# Patient Record
Sex: FEMALE | Race: WHITE | Employment: FULL TIME
[De-identification: names, ages, dates, MRNs, and addresses within clinical notes are randomized per-mention and may not be internally consistent; named-entity substitution may affect disease eponyms.]

---

## 2017-01-04 ENCOUNTER — OFFICE VISIT (OUTPATIENT)
Dept: FAMILY MEDICINE CLINIC | Facility: CLINIC | Age: 52
End: 2017-01-04

## 2017-01-04 VITALS
RESPIRATION RATE: 18 BRPM | DIASTOLIC BLOOD PRESSURE: 64 MMHG | WEIGHT: 112 LBS | BODY MASS INDEX: 19.84 KG/M2 | SYSTOLIC BLOOD PRESSURE: 104 MMHG | HEIGHT: 63 IN | HEART RATE: 66 BPM

## 2017-01-04 DIAGNOSIS — M62.838 MUSCLE SPASM: ICD-10-CM

## 2017-01-04 DIAGNOSIS — M85.80 OSTEOPENIA: ICD-10-CM

## 2017-01-04 DIAGNOSIS — M62.89 MUSCLE STIFFNESS: Primary | ICD-10-CM

## 2017-01-04 DIAGNOSIS — M46.1 SACROILIITIS (HCC): ICD-10-CM

## 2017-01-04 DIAGNOSIS — M54.6 ACUTE MIDLINE THORACIC BACK PAIN: ICD-10-CM

## 2017-01-04 PROCEDURE — 99214 OFFICE O/P EST MOD 30 MIN: CPT | Performed by: NURSE PRACTITIONER

## 2017-01-04 RX ORDER — ACETAMINOPHEN 160 MG
TABLET,DISINTEGRATING ORAL
COMMUNITY
End: 2022-06-20

## 2017-01-05 ASSESSMENT — ENCOUNTER SYMPTOMS
EYES NEGATIVE: 1
ABDOMINAL DISTENTION: 0
BACK PAIN: 1
COUGH: 0
WHEEZING: 0

## 2017-02-09 ENCOUNTER — PATIENT MESSAGE (OUTPATIENT)
Dept: FAMILY MEDICINE CLINIC | Facility: CLINIC | Age: 52
End: 2017-02-09

## 2017-06-12 ENCOUNTER — HOSPITAL ENCOUNTER (OUTPATIENT)
Age: 52
Setting detail: SPECIMEN
Discharge: HOME | End: 2017-06-12
Payer: COMMERCIAL

## 2017-06-12 ENCOUNTER — OFFICE VISIT (OUTPATIENT)
Dept: OBGYN | Age: 52
End: 2017-06-12
Payer: COMMERCIAL

## 2017-06-12 VITALS
SYSTOLIC BLOOD PRESSURE: 112 MMHG | DIASTOLIC BLOOD PRESSURE: 68 MMHG | WEIGHT: 111 LBS | RESPIRATION RATE: 16 BRPM | BODY MASS INDEX: 19.67 KG/M2 | HEIGHT: 63 IN

## 2017-06-12 DIAGNOSIS — F41.9 ANXIETY AND DEPRESSION: ICD-10-CM

## 2017-06-12 DIAGNOSIS — M85.80 OSTEOPENIA DETERMINED BY X-RAY: ICD-10-CM

## 2017-06-12 DIAGNOSIS — F32.A ANXIETY AND DEPRESSION: ICD-10-CM

## 2017-06-12 DIAGNOSIS — N95.0 PMB (POSTMENOPAUSAL BLEEDING): Primary | ICD-10-CM

## 2017-06-12 PROCEDURE — 1036F TOBACCO NON-USER: CPT | Performed by: OBSTETRICS & GYNECOLOGY

## 2017-06-12 PROCEDURE — 58100 BIOPSY OF UTERUS LINING: CPT | Performed by: OBSTETRICS & GYNECOLOGY

## 2017-06-12 PROCEDURE — 99213 OFFICE O/P EST LOW 20 MIN: CPT | Performed by: OBSTETRICS & GYNECOLOGY

## 2017-06-12 PROCEDURE — 88305 TISSUE EXAM BY PATHOLOGIST: CPT

## 2017-06-12 RX ORDER — IBANDRONATE SODIUM 150 MG/1
TABLET, FILM COATED ORAL
Qty: 3 TABLET | Refills: 4 | Status: SHIPPED | OUTPATIENT
Start: 2017-06-12 | End: 2017-07-06 | Stop reason: SDUPTHER

## 2017-06-12 RX ORDER — DESVENLAFAXINE 100 MG/1
100 TABLET, EXTENDED RELEASE ORAL DAILY
Qty: 90 TABLET | Refills: 4 | Status: SHIPPED | OUTPATIENT
Start: 2017-06-12 | End: 2017-07-06 | Stop reason: SDUPTHER

## 2017-06-14 LAB — SURGICAL PATHOLOGY REPORT: NORMAL

## 2017-06-30 ENCOUNTER — OFFICE VISIT (OUTPATIENT)
Dept: FAMILY MEDICINE CLINIC | Age: 52
End: 2017-06-30
Payer: COMMERCIAL

## 2017-06-30 VITALS
SYSTOLIC BLOOD PRESSURE: 110 MMHG | HEART RATE: 78 BPM | BODY MASS INDEX: 20.24 KG/M2 | RESPIRATION RATE: 18 BRPM | HEIGHT: 62 IN | WEIGHT: 110 LBS | OXYGEN SATURATION: 98 % | DIASTOLIC BLOOD PRESSURE: 80 MMHG

## 2017-06-30 DIAGNOSIS — F32.A ANXIETY AND DEPRESSION: Primary | ICD-10-CM

## 2017-06-30 DIAGNOSIS — F34.1 DYSTHYMIC DISORDER: ICD-10-CM

## 2017-06-30 DIAGNOSIS — F41.9 ANXIETY AND DEPRESSION: Primary | ICD-10-CM

## 2017-06-30 DIAGNOSIS — Z11.4 SCREENING FOR HIV WITHOUT PRESENCE OF RISK FACTORS: ICD-10-CM

## 2017-06-30 DIAGNOSIS — M85.80 OSTEOPENIA: ICD-10-CM

## 2017-06-30 DIAGNOSIS — Z11.59 NEED FOR HEPATITIS C SCREENING TEST: ICD-10-CM

## 2017-06-30 DIAGNOSIS — F43.0 STRESS REACTION: ICD-10-CM

## 2017-06-30 PROCEDURE — G8420 CALC BMI NORM PARAMETERS: HCPCS | Performed by: NURSE PRACTITIONER

## 2017-06-30 PROCEDURE — 1036F TOBACCO NON-USER: CPT | Performed by: NURSE PRACTITIONER

## 2017-06-30 PROCEDURE — 3014F SCREEN MAMMO DOC REV: CPT | Performed by: NURSE PRACTITIONER

## 2017-06-30 PROCEDURE — G8427 DOCREV CUR MEDS BY ELIG CLIN: HCPCS | Performed by: NURSE PRACTITIONER

## 2017-06-30 PROCEDURE — 3017F COLORECTAL CA SCREEN DOC REV: CPT | Performed by: NURSE PRACTITIONER

## 2017-06-30 PROCEDURE — 99214 OFFICE O/P EST MOD 30 MIN: CPT | Performed by: NURSE PRACTITIONER

## 2017-06-30 ASSESSMENT — ENCOUNTER SYMPTOMS
ABDOMINAL DISTENTION: 0
COUGH: 0
EYES NEGATIVE: 1

## 2017-07-06 ENCOUNTER — TELEPHONE (OUTPATIENT)
Dept: FAMILY MEDICINE CLINIC | Age: 52
End: 2017-07-06

## 2017-07-06 DIAGNOSIS — M85.80 OSTEOPENIA DETERMINED BY X-RAY: ICD-10-CM

## 2017-07-06 DIAGNOSIS — F41.9 ANXIETY AND DEPRESSION: ICD-10-CM

## 2017-07-06 DIAGNOSIS — I10 ESSENTIAL HYPERTENSION, BENIGN: ICD-10-CM

## 2017-07-06 DIAGNOSIS — F32.A ANXIETY AND DEPRESSION: ICD-10-CM

## 2017-07-06 RX ORDER — IBANDRONATE SODIUM 150 MG/1
TABLET, FILM COATED ORAL
Qty: 3 TABLET | Refills: 4 | Status: SHIPPED | OUTPATIENT
Start: 2017-07-06 | End: 2017-09-12 | Stop reason: SDUPTHER

## 2017-07-06 RX ORDER — DESVENLAFAXINE 100 MG/1
100 TABLET, EXTENDED RELEASE ORAL DAILY
Qty: 90 TABLET | Refills: 4 | Status: SHIPPED | OUTPATIENT
Start: 2017-07-06 | End: 2017-09-12 | Stop reason: SDUPTHER

## 2017-07-07 RX ORDER — HYDROCHLOROTHIAZIDE 25 MG/1
25 TABLET ORAL DAILY
Qty: 90 TABLET | Refills: 1 | Status: SHIPPED | OUTPATIENT
Start: 2017-07-07 | End: 2017-07-14 | Stop reason: SDUPTHER

## 2017-07-14 RX ORDER — HYDROCHLOROTHIAZIDE 25 MG/1
25 TABLET ORAL DAILY
Qty: 90 TABLET | Refills: 1 | Status: SHIPPED | OUTPATIENT
Start: 2017-07-14 | End: 2017-12-26 | Stop reason: SDUPTHER

## 2017-09-12 ENCOUNTER — HOSPITAL ENCOUNTER (OUTPATIENT)
Age: 52
Setting detail: SPECIMEN
Discharge: HOME | End: 2017-09-12
Payer: COMMERCIAL

## 2017-09-12 ENCOUNTER — OFFICE VISIT (OUTPATIENT)
Dept: OBGYN CLINIC | Age: 52
End: 2017-09-12
Payer: COMMERCIAL

## 2017-09-12 VITALS
RESPIRATION RATE: 16 BRPM | HEIGHT: 63 IN | BODY MASS INDEX: 20.38 KG/M2 | SYSTOLIC BLOOD PRESSURE: 110 MMHG | DIASTOLIC BLOOD PRESSURE: 62 MMHG | WEIGHT: 115 LBS

## 2017-09-12 DIAGNOSIS — Z12.31 ENCOUNTER FOR SCREENING MAMMOGRAM FOR BREAST CANCER: Primary | ICD-10-CM

## 2017-09-12 DIAGNOSIS — M85.80 OSTEOPENIA DETERMINED BY X-RAY: ICD-10-CM

## 2017-09-12 DIAGNOSIS — F32.A ANXIETY AND DEPRESSION: ICD-10-CM

## 2017-09-12 DIAGNOSIS — F41.9 ANXIETY AND DEPRESSION: ICD-10-CM

## 2017-09-12 DIAGNOSIS — Z12.4 ENCOUNTER FOR SCREENING FOR CERVICAL CANCER: ICD-10-CM

## 2017-09-12 PROCEDURE — 99396 PREV VISIT EST AGE 40-64: CPT | Performed by: OBSTETRICS & GYNECOLOGY

## 2017-09-12 PROCEDURE — G0145 SCR C/V CYTO,THINLAYER,RESCR: HCPCS

## 2017-09-12 RX ORDER — IBANDRONATE SODIUM 150 MG/1
TABLET, FILM COATED ORAL
Qty: 3 TABLET | Refills: 4 | Status: SHIPPED | OUTPATIENT
Start: 2017-09-12 | End: 2018-10-02 | Stop reason: CLARIF

## 2017-09-12 RX ORDER — DESVENLAFAXINE 100 MG/1
100 TABLET, EXTENDED RELEASE ORAL DAILY
Qty: 90 TABLET | Refills: 4 | Status: SHIPPED | OUTPATIENT
Start: 2017-09-12 | End: 2018-08-27 | Stop reason: SDUPTHER

## 2017-09-20 LAB — CYTOLOGY REPORT: NORMAL

## 2017-11-29 ENCOUNTER — HOSPITAL ENCOUNTER (OUTPATIENT)
Dept: MAMMOGRAPHY | Age: 52
Discharge: HOME | End: 2017-11-29
Payer: COMMERCIAL

## 2017-11-29 DIAGNOSIS — Z12.31 ENCOUNTER FOR SCREENING MAMMOGRAM FOR BREAST CANCER: ICD-10-CM

## 2017-11-29 PROCEDURE — G0202 SCR MAMMO BI INCL CAD: HCPCS

## 2017-12-26 DIAGNOSIS — I10 ESSENTIAL HYPERTENSION, BENIGN: ICD-10-CM

## 2017-12-26 RX ORDER — HYDROCHLOROTHIAZIDE 25 MG/1
TABLET ORAL
Qty: 90 TABLET | Refills: 1 | Status: SHIPPED | OUTPATIENT
Start: 2017-12-26 | End: 2018-06-24 | Stop reason: SDUPTHER

## 2018-01-18 DIAGNOSIS — G43.101 MIGRAINE WITH AURA AND WITH STATUS MIGRAINOSUS, NOT INTRACTABLE: ICD-10-CM

## 2018-01-18 RX ORDER — ZOLMITRIPTAN 5 MG/1
5 TABLET, FILM COATED ORAL PRN
Qty: 30 TABLET | Refills: 1 | Status: SHIPPED | OUTPATIENT
Start: 2018-01-18 | End: 2018-08-28 | Stop reason: SDUPTHER

## 2018-03-13 DIAGNOSIS — I10 ESSENTIAL HYPERTENSION, BENIGN: ICD-10-CM

## 2018-03-13 RX ORDER — ATENOLOL 25 MG/1
25 TABLET ORAL DAILY
Qty: 90 TABLET | Refills: 1 | Status: SHIPPED | OUTPATIENT
Start: 2018-03-13 | End: 2018-08-17 | Stop reason: SDUPTHER

## 2018-03-13 NOTE — TELEPHONE ENCOUNTER
From: Milagros Barron  Sent: 3/13/2018 7:50 AM EDT  Subject: Medication Renewal Request    Clay Andersonmarco antoniosonja.  Luh Crawford would like a refill of the following medications:     atenolol (TENORMIN) 25 MG tablet Alana Burr NP]    Preferred pharmacy: [address] [place] [address] , [address] [facility] [phone] [facility] [phone]    Comment:

## 2018-05-23 ENCOUNTER — OFFICE VISIT (OUTPATIENT)
Dept: FAMILY MEDICINE CLINIC | Age: 53
End: 2018-05-23
Payer: COMMERCIAL

## 2018-05-23 VITALS
WEIGHT: 113 LBS | HEART RATE: 62 BPM | RESPIRATION RATE: 18 BRPM | BODY MASS INDEX: 20.02 KG/M2 | SYSTOLIC BLOOD PRESSURE: 110 MMHG | DIASTOLIC BLOOD PRESSURE: 70 MMHG | HEIGHT: 63 IN | OXYGEN SATURATION: 98 %

## 2018-05-23 DIAGNOSIS — G56.02 CARPAL TUNNEL SYNDROME OF LEFT WRIST: Primary | ICD-10-CM

## 2018-05-23 DIAGNOSIS — M25.532 LEFT WRIST PAIN: ICD-10-CM

## 2018-05-23 PROCEDURE — 1036F TOBACCO NON-USER: CPT | Performed by: NURSE PRACTITIONER

## 2018-05-23 PROCEDURE — G8427 DOCREV CUR MEDS BY ELIG CLIN: HCPCS | Performed by: NURSE PRACTITIONER

## 2018-05-23 PROCEDURE — 3017F COLORECTAL CA SCREEN DOC REV: CPT | Performed by: NURSE PRACTITIONER

## 2018-05-23 PROCEDURE — 99213 OFFICE O/P EST LOW 20 MIN: CPT | Performed by: NURSE PRACTITIONER

## 2018-05-23 PROCEDURE — G8420 CALC BMI NORM PARAMETERS: HCPCS | Performed by: NURSE PRACTITIONER

## 2018-05-23 PROCEDURE — 3014F SCREEN MAMMO DOC REV: CPT | Performed by: NURSE PRACTITIONER

## 2018-05-24 ASSESSMENT — ENCOUNTER SYMPTOMS
CHEST TIGHTNESS: 0
ABDOMINAL DISTENTION: 0
SINUS PRESSURE: 0

## 2018-06-24 DIAGNOSIS — I10 ESSENTIAL HYPERTENSION, BENIGN: ICD-10-CM

## 2018-06-25 RX ORDER — HYDROCHLOROTHIAZIDE 25 MG/1
TABLET ORAL
Qty: 90 TABLET | Refills: 1 | Status: SHIPPED | OUTPATIENT
Start: 2018-06-25 | End: 2018-12-22 | Stop reason: SDUPTHER

## 2018-08-17 DIAGNOSIS — I10 ESSENTIAL HYPERTENSION, BENIGN: ICD-10-CM

## 2018-08-17 RX ORDER — ATENOLOL 25 MG/1
TABLET ORAL
Qty: 90 TABLET | Refills: 1 | Status: SHIPPED | OUTPATIENT
Start: 2018-08-17 | End: 2019-02-13 | Stop reason: SDUPTHER

## 2018-08-17 NOTE — TELEPHONE ENCOUNTER
Last OV: 5/23/2018  Last RX: 3-13-18  Next scheduled apt: Visit date not found  Medication pending  Sure scripts request

## 2018-08-27 DIAGNOSIS — F32.A ANXIETY AND DEPRESSION: ICD-10-CM

## 2018-08-27 DIAGNOSIS — F41.9 ANXIETY AND DEPRESSION: ICD-10-CM

## 2018-08-27 RX ORDER — DESVENLAFAXINE 100 MG/1
100 TABLET, EXTENDED RELEASE ORAL DAILY
Qty: 90 TABLET | Refills: 4 | Status: SHIPPED | OUTPATIENT
Start: 2018-08-27 | End: 2019-08-13 | Stop reason: SDUPTHER

## 2018-08-28 DIAGNOSIS — G43.101 MIGRAINE WITH AURA AND WITH STATUS MIGRAINOSUS, NOT INTRACTABLE: ICD-10-CM

## 2018-08-28 RX ORDER — ZOLMITRIPTAN 5 MG/1
5 TABLET, FILM COATED ORAL PRN
Qty: 30 TABLET | Refills: 1 | Status: SHIPPED | OUTPATIENT
Start: 2018-08-28 | End: 2020-05-06 | Stop reason: SDUPTHER

## 2018-10-02 ENCOUNTER — OFFICE VISIT (OUTPATIENT)
Dept: OBGYN CLINIC | Age: 53
End: 2018-10-02
Payer: COMMERCIAL

## 2018-10-02 ENCOUNTER — HOSPITAL ENCOUNTER (OUTPATIENT)
Age: 53
Setting detail: SPECIMEN
Discharge: HOME | End: 2018-10-02
Payer: COMMERCIAL

## 2018-10-02 VITALS
HEIGHT: 63 IN | WEIGHT: 113 LBS | BODY MASS INDEX: 20.02 KG/M2 | SYSTOLIC BLOOD PRESSURE: 100 MMHG | RESPIRATION RATE: 16 BRPM | HEART RATE: 60 BPM | DIASTOLIC BLOOD PRESSURE: 62 MMHG

## 2018-10-02 DIAGNOSIS — Z12.4 ENCOUNTER FOR SCREENING FOR CERVICAL CANCER: ICD-10-CM

## 2018-10-02 DIAGNOSIS — Z12.31 ENCOUNTER FOR SCREENING MAMMOGRAM FOR BREAST CANCER: Primary | ICD-10-CM

## 2018-10-02 DIAGNOSIS — M85.80 OSTEOPENIA DETERMINED BY X-RAY: ICD-10-CM

## 2018-10-02 PROCEDURE — 99396 PREV VISIT EST AGE 40-64: CPT | Performed by: OBSTETRICS & GYNECOLOGY

## 2018-10-02 PROCEDURE — G8484 FLU IMMUNIZE NO ADMIN: HCPCS | Performed by: OBSTETRICS & GYNECOLOGY

## 2018-10-02 PROCEDURE — G0145 SCR C/V CYTO,THINLAYER,RESCR: HCPCS

## 2018-10-02 RX ORDER — RALOXIFENE HYDROCHLORIDE 60 MG/1
60 TABLET, FILM COATED ORAL DAILY
Qty: 90 TABLET | Refills: 4 | Status: SHIPPED | OUTPATIENT
Start: 2018-10-02 | End: 2019-10-08 | Stop reason: SDUPTHER

## 2018-10-02 ASSESSMENT — PATIENT HEALTH QUESTIONNAIRE - PHQ9
SUM OF ALL RESPONSES TO PHQ QUESTIONS 1-9: 0
SUM OF ALL RESPONSES TO PHQ QUESTIONS 1-9: 0
2. FEELING DOWN, DEPRESSED OR HOPELESS: 0
SUM OF ALL RESPONSES TO PHQ9 QUESTIONS 1 & 2: 0
1. LITTLE INTEREST OR PLEASURE IN DOING THINGS: 0

## 2018-10-19 LAB — CYTOLOGY REPORT: NORMAL

## 2018-11-02 ENCOUNTER — OFFICE VISIT (OUTPATIENT)
Dept: FAMILY MEDICINE CLINIC | Age: 53
End: 2018-11-02
Payer: COMMERCIAL

## 2018-11-02 VITALS
DIASTOLIC BLOOD PRESSURE: 62 MMHG | SYSTOLIC BLOOD PRESSURE: 100 MMHG | WEIGHT: 113 LBS | RESPIRATION RATE: 18 BRPM | BODY MASS INDEX: 20.02 KG/M2 | HEART RATE: 68 BPM | HEIGHT: 63 IN

## 2018-11-02 DIAGNOSIS — J01.00 ACUTE MAXILLARY SINUSITIS, RECURRENCE NOT SPECIFIED: ICD-10-CM

## 2018-11-02 PROCEDURE — 3014F SCREEN MAMMO DOC REV: CPT | Performed by: INTERNAL MEDICINE

## 2018-11-02 PROCEDURE — 1036F TOBACCO NON-USER: CPT | Performed by: INTERNAL MEDICINE

## 2018-11-02 PROCEDURE — 3017F COLORECTAL CA SCREEN DOC REV: CPT | Performed by: INTERNAL MEDICINE

## 2018-11-02 PROCEDURE — G8427 DOCREV CUR MEDS BY ELIG CLIN: HCPCS | Performed by: INTERNAL MEDICINE

## 2018-11-02 PROCEDURE — 99213 OFFICE O/P EST LOW 20 MIN: CPT | Performed by: INTERNAL MEDICINE

## 2018-11-02 PROCEDURE — G8420 CALC BMI NORM PARAMETERS: HCPCS | Performed by: INTERNAL MEDICINE

## 2018-11-02 PROCEDURE — G8484 FLU IMMUNIZE NO ADMIN: HCPCS | Performed by: INTERNAL MEDICINE

## 2018-11-02 RX ORDER — CLARITHROMYCIN 250 MG/1
250 TABLET, FILM COATED ORAL 2 TIMES DAILY
Qty: 20 TABLET | Refills: 0 | Status: SHIPPED | OUTPATIENT
Start: 2018-11-02 | End: 2018-11-12

## 2018-11-02 ASSESSMENT — ENCOUNTER SYMPTOMS
ABDOMINAL PAIN: 0
SHORTNESS OF BREATH: 0
NAUSEA: 0
SINUS COMPLAINT: 1
WHEEZING: 0
EYE REDNESS: 0
EYE ITCHING: 0
CHEST TIGHTNESS: 0
COUGH: 1
SINUS PRESSURE: 1
SORE THROAT: 1
EYE PAIN: 1
EYE DISCHARGE: 0

## 2018-11-02 NOTE — PROGRESS NOTES
PROT 7.1 07/14/2016    LABALBU 4.1 07/14/2016    BILITOT 0.59 07/14/2016    ALKPHOS 49 07/14/2016    AST 21 07/14/2016    ALT 15 07/14/2016     Lab Results   Component Value Date    WBC 6.0 07/14/2016    RBC 3.83 07/14/2016    HGB 12.5 07/14/2016    HCT 37.1 07/14/2016    MCV 97.0 07/14/2016    MCH 32.7 07/14/2016    MCHC 33.8 07/14/2016    RDW 13.2 07/14/2016     07/14/2016    MPV NOT REPORTED 07/14/2016     Lab Results   Component Value Date    TSH 1.34 08/12/2016     Lab Results   Component Value Date    CHOL 215 04/25/2013    HDL 61 04/25/2013          Assessment & Plan        Diagnosis Orders   1. Acute maxillary sinusitis, recurrence not specified   Prescribed Biaxin, she declined cough meds and decongestants stating will take OTC if needed. clarithromycin (BIAXIN) 250 MG tablet                   Completed Refills   Requested Prescriptions     Signed Prescriptions Disp Refills    clarithromycin (BIAXIN) 250 MG tablet 20 tablet 0     Sig: Take 1 tablet by mouth 2 times daily for 10 days     Return if symptoms worsen or fail to improve. Orders Placed This Encounter   Medications    clarithromycin (BIAXIN) 250 MG tablet     Sig: Take 1 tablet by mouth 2 times daily for 10 days     Dispense:  20 tablet     Refill:  0     No orders of the defined types were placed in this encounter. Patient Instructions     SURVEY:    You may be receiving a survey from [employer] regarding your visit today. Please complete the survey to enable us to provide the highest quality of care to you and your family. If you cannot score us a very good on any question, please call the office to discuss how we could of made your experience a very good one. Thank you.         Electronically signed by Jacinta Franklin MD on 11/2/2018 at 10:00 AM           Completed Refills   Requested Prescriptions     Signed Prescriptions Disp Refills    clarithromycin (BIAXIN) 250 MG tablet 20 tablet 0     Sig: Take 1 tablet by

## 2018-12-12 ENCOUNTER — HOSPITAL ENCOUNTER (OUTPATIENT)
Dept: MAMMOGRAPHY | Age: 53
Discharge: HOME | End: 2018-12-14
Payer: COMMERCIAL

## 2018-12-12 DIAGNOSIS — Z12.31 ENCOUNTER FOR SCREENING MAMMOGRAM FOR BREAST CANCER: ICD-10-CM

## 2018-12-12 PROCEDURE — 77067 SCR MAMMO BI INCL CAD: CPT

## 2018-12-22 DIAGNOSIS — I10 ESSENTIAL HYPERTENSION, BENIGN: ICD-10-CM

## 2018-12-24 RX ORDER — HYDROCHLOROTHIAZIDE 25 MG/1
TABLET ORAL
Qty: 90 TABLET | Refills: 1 | Status: SHIPPED | OUTPATIENT
Start: 2018-12-24 | End: 2019-06-22 | Stop reason: SDUPTHER

## 2019-02-13 DIAGNOSIS — I10 ESSENTIAL HYPERTENSION, BENIGN: ICD-10-CM

## 2019-02-13 RX ORDER — ATENOLOL 25 MG/1
TABLET ORAL
Qty: 90 TABLET | Refills: 1 | Status: SHIPPED | OUTPATIENT
Start: 2019-02-13 | End: 2019-08-13 | Stop reason: SDUPTHER

## 2019-04-15 ENCOUNTER — PATIENT MESSAGE (OUTPATIENT)
Dept: OTHER | Facility: CLINIC | Age: 54
End: 2019-04-15

## 2019-04-25 ENCOUNTER — OFFICE VISIT (OUTPATIENT)
Dept: ENT CLINIC | Age: 54
End: 2019-04-25
Payer: COMMERCIAL

## 2019-04-25 VITALS
TEMPERATURE: 97.8 F | WEIGHT: 115 LBS | OXYGEN SATURATION: 98 % | DIASTOLIC BLOOD PRESSURE: 80 MMHG | BODY MASS INDEX: 20.38 KG/M2 | RESPIRATION RATE: 18 BRPM | SYSTOLIC BLOOD PRESSURE: 122 MMHG | HEIGHT: 63 IN | HEART RATE: 67 BPM

## 2019-04-25 DIAGNOSIS — H61.899 DEBRIS IN EAR CANAL: Primary | ICD-10-CM

## 2019-04-25 DIAGNOSIS — H91.90 HEARING LOSS, UNSPECIFIED HEARING LOSS TYPE, UNSPECIFIED LATERALITY: ICD-10-CM

## 2019-04-25 PROCEDURE — 99203 OFFICE O/P NEW LOW 30 MIN: CPT | Performed by: PHYSICIAN ASSISTANT

## 2019-04-25 ASSESSMENT — ENCOUNTER SYMPTOMS
SHORTNESS OF BREATH: 0
BACK PAIN: 0
TROUBLE SWALLOWING: 0
SORE THROAT: 0
COUGH: 0
RECTAL PAIN: 0
CONSTIPATION: 0
VOMITING: 0
COLOR CHANGE: 0
WHEEZING: 0
EYE DISCHARGE: 0
ABDOMINAL DISTENTION: 0
EYE REDNESS: 0
VOICE CHANGE: 0
BLOOD IN STOOL: 0
NAUSEA: 0
APNEA: 0
ANAL BLEEDING: 0
STRIDOR: 0
DIARRHEA: 0
EYE PAIN: 0
FACIAL SWELLING: 0
SINUS PAIN: 0
EYE ITCHING: 0
PHOTOPHOBIA: 0
SINUS PRESSURE: 0
ABDOMINAL PAIN: 0
CHOKING: 0
CHEST TIGHTNESS: 0
RHINORRHEA: 0

## 2019-04-25 NOTE — PROGRESS NOTES
[facility] [address] [place] [address] [facility]  [address] [facility] [address]  Dept: [phone]  MD Diane Ruano PA-C Britta Providence 52 y.o. female     Patient presents with a chief complaint of New Patient (Patient presents today for slight hearing loss-states she was told she \"needs a thin layer over her eardrum suctioned\". ) and Hearing Problem (Right ear marked hearing loss for about 1 year. Does have hearing aid at this time. patient states that she has family history of hearing loss in mother. Had two hearing tests. will obtain records. )       /80 (Site: Right Upper Arm, Position: Sitting, Cuff Size: Medium Adult)   Pulse 67   Temp 97.8 °F (36.6 °C)   Resp 18   Ht 5' 2.5\" (1.588 m)   Wt 115 lb (52.2 kg)   LMP 09/28/2015 (Approximate)   SpO2 98%   BMI 20.70 kg/m²       History of Presenting Illness:  Pt is a new Pt to me. She presents to have something removed from right ear drum. Pt has been seen at  [facility] (in [place], [place]) and someone there noted something on the R ear drum (like a film of something). Pt denies that this is causing problems for her. Hearing loss? Pt denies that she perceives active hearing loss while at this visit. 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 Believes that her hearing problem at work is also related to the larger room that she works in at work. Pt indicates that she started noticing the hearing problems around 6-12 months ago and onset was gradual.  Pt indicates that she initially had hearing testing December 2018 at [facility]. She went back in March 2019 because hearing was getting worse. There was noted to be a change from December 2018 to March 2019 testing and Pt indicates hearing was worse in the right ear. Was told candidate for hearing aids in both ears according to Pt.   Only got aid for ASCUS of cervix with negative high risk HPV     Encounter for screening colonoscopy 12/3/2013    Endometriosis     Hyperlipidemia     Hypertension     Migraine        Current Outpatient Medications:     atenolol (TENORMIN) 25 MG tablet, TAKE 1 TABLET DAILY, Disp: 90 tablet, Rfl: 1    hydrochlorothiazide (HYDRODIURIL) 25 MG tablet, TAKE 1 TABLET DAILY, Disp: 90 tablet, Rfl: 1    raloxifene (EVISTA) 60 MG tablet, Take 1 tablet by mouth daily, Disp: 90 tablet, Rfl: 4    ZOLMitriptan (ZOMIG) 5 MG tablet, Take 1 tablet by mouth as needed for Migraine, Disp: 30 tablet, Rfl: 1    desvenlafaxine succinate (PRISTIQ) 100 MG TB24 extended release tablet, Take 1 tablet by mouth daily, Disp: 90 tablet, Rfl: 4    NONFORMULARY, Indications: Calcium takes 1000 a day, Disp: , Rfl:     Cholecalciferol (VITAMIN D3) 2000 UNITS CAPS, Take by mouth Indications: takes 1 a day, Disp: , Rfl:     NONFORMULARY, Indications: Orthomega fish oil 1 capsule, Disp: , Rfl:     NONFORMULARY, Indications: PhytoMulti 1 tablet daily, Disp: , Rfl:    Allergies   Allergen Reactions    Boniva [Ibandronic Acid] Other (See Comments)     Severe joint pain    Penicillins       Past Surgical History:   Procedure Laterality Date    COLONOSCOPY      in her 25s   Ramona Moss LAPAROSCOPY        Social History     Socioeconomic History    Marital status:      Spouse name: Not on file    Number of children: Not on file    Years of education: Not on file    Highest education level: Not on file   Occupational History    Not on file   Social Needs    Financial resource strain: Not on file    Food insecurity:     Worry: Not on file     Inability: Not on file    Transportation needs:     Medical: Not on file     Non-medical: Not on file   Tobacco Use    Smoking status: Never Smoker    Smokeless tobacco: Never Used   Substance and Sexual Activity    Alcohol use: Yes     Comment: occas.     Drug use: No    Sexual activity: Yes     Partners: Male normally formed, no lesions; no mastoid tenderness, redness or swelling    Left External Ear: normally formed, no lesions; no mastoid tenderness, redness or swelling    Right External Auditory Canal: normally formed, healthy skin (but with some sloughed/sloughing epithelium around mid canal removed using ear forceps under binocular microscopy), no erythema, no edema, no skin lesions, no obstructing cerumen, no discharge    Left External Auditory Canal: normally formed, healthy skin, no erythema, no edema, no skin lesions, no obstructing cerumen, no discharge    Right Tympanic Membrane: normal landmarks, translucent, mobile to pneumatic otoscopy, no perforation, no effusion, drum not retracted or bulging, middle ear space appears well-ventilated, no cholesteatoma, no epithelial slough, no granulation tissue  Centrally there is a very small area of faint yellow coloration    Left Tympanic Membrane: normal landmarks, translucent, mobile to pneumatic otoscopy, no perforation, no effusion, drum not retracted or bulging, middle ear space appears well-ventilated, no cholesteatoma, no epithelial slough, no granulation tissue    Hearing: intact to spoken voice, intact to finger rub bilaterally, Cruz heard better in right ear per Pt, Right Ear: Rinne AC>BC, Left Ear: Rinne AC>BC    NOSE:    Nasal Skin: no lesions, no lacerations, no scars    Nasal Dorsum: symmetric with no visible or palpable deformities    Nasal Tip: normal symmetric nasal tip, normal nasal valves    Nasal Mucosa: normal, pink and moist    Septum: not markedly deformed, midline, no exposed vessels , no bleeding, no septal granuloma    Turbinates: normal size and conformation    ORAL CAVITY/MOUTH:    Lips, teeth, gums: normal lips, normal gums, dentition intact    Oral Mucosa: normal, moist, no lesions    Palate: normally formed hard palate, normally formed soft palate, symmetric palatal elevation; no masses or lesions    Floor of Mouth: normal floor of

## 2019-04-25 NOTE — PROGRESS NOTES
Review of Systems   Constitutional: Negative for activity change, appetite change, chills, diaphoresis, fatigue, fever and unexpected weight change. HENT: Positive for hearing loss. Negative for congestion, dental problem, drooling, ear discharge, ear pain, facial swelling, mouth sores, nosebleeds, postnasal drip, rhinorrhea, sinus pressure, sinus pain, sneezing, sore throat, tinnitus, trouble swallowing and voice change. Eyes: Negative for photophobia, pain, discharge, redness, itching and visual disturbance. Respiratory: Negative for apnea, cough, choking, chest tightness, shortness of breath, wheezing and stridor. Cardiovascular: Negative for chest pain, palpitations and leg swelling. Gastrointestinal: Negative for abdominal distention, abdominal pain, anal bleeding, blood in stool, constipation, diarrhea, nausea, rectal pain and vomiting. Endocrine: Negative for cold intolerance, heat intolerance, polydipsia, polyphagia and polyuria. Genitourinary: Negative for decreased urine volume, difficulty urinating, dyspareunia, dysuria, enuresis, flank pain, frequency, genital sores, hematuria, menstrual problem, pelvic pain, urgency, vaginal bleeding, vaginal discharge and vaginal pain. Musculoskeletal: Negative for arthralgias, back pain, gait problem, joint swelling, myalgias, neck pain and neck stiffness. Skin: Negative for color change, pallor, rash and wound. Allergic/Immunologic: Negative for environmental allergies, food allergies and immunocompromised state. Neurological: Positive for headaches. Negative for dizziness, tremors, seizures, syncope, facial asymmetry, speech difficulty, weakness, light-headedness and numbness. Hematological: Negative for adenopathy. Does not bruise/bleed easily. Psychiatric/Behavioral: Negative for agitation, behavioral problems, confusion, decreased concentration, dysphoric mood, hallucinations, self-injury, sleep disturbance and suicidal ideas.  The patient is not nervous/anxious and is not hyperactive.

## 2019-06-22 DIAGNOSIS — I10 ESSENTIAL HYPERTENSION, BENIGN: ICD-10-CM

## 2019-06-24 RX ORDER — HYDROCHLOROTHIAZIDE 25 MG/1
TABLET ORAL
Qty: 90 TABLET | Refills: 0 | Status: SHIPPED | OUTPATIENT
Start: 2019-06-24 | End: 2019-09-23 | Stop reason: SDUPTHER

## 2019-08-12 ENCOUNTER — OFFICE VISIT (OUTPATIENT)
Dept: SURGERY | Age: 54
End: 2019-08-12

## 2019-08-12 ENCOUNTER — HOSPITAL ENCOUNTER (OUTPATIENT)
Age: 54
Discharge: HOME | End: 2019-08-12
Payer: COMMERCIAL

## 2019-08-12 VITALS — HEIGHT: 63 IN | RESPIRATION RATE: 16 BRPM | BODY MASS INDEX: 20.02 KG/M2 | WEIGHT: 113 LBS

## 2019-08-12 DIAGNOSIS — Z01.818 PREOPERATIVE TESTING: ICD-10-CM

## 2019-08-12 DIAGNOSIS — Z83.71 FAMILY HISTORY OF COLONIC POLYPS: ICD-10-CM

## 2019-08-12 DIAGNOSIS — Z12.11 ENCOUNTER FOR SCREENING COLONOSCOPY: Primary | ICD-10-CM

## 2019-08-12 PROCEDURE — 99999 PR OFFICE/OUTPT VISIT,PROCEDURE ONLY: CPT | Performed by: SURGERY

## 2019-08-12 PROCEDURE — 93005 ELECTROCARDIOGRAM TRACING: CPT

## 2019-08-12 RX ORDER — SODIUM, POTASSIUM,MAG SULFATES 17.5-3.13G
1 SOLUTION, RECONSTITUTED, ORAL ORAL ONCE
Qty: 2 BOTTLE | Refills: 0 | Status: SHIPPED | OUTPATIENT
Start: 2019-08-12 | End: 2019-08-12

## 2019-08-13 LAB
EKG ATRIAL RATE: 54 BPM
EKG P AXIS: 13 DEGREES
EKG P-R INTERVAL: 148 MS
EKG Q-T INTERVAL: 426 MS
EKG QRS DURATION: 84 MS
EKG QTC CALCULATION (BAZETT): 403 MS
EKG R AXIS: 55 DEGREES
EKG T AXIS: 56 DEGREES
EKG VENTRICULAR RATE: 54 BPM

## 2019-08-13 PROCEDURE — 93010 ELECTROCARDIOGRAM REPORT: CPT | Performed by: INTERNAL MEDICINE

## 2019-08-19 LAB
CHOLESTEROL, TOTAL: 226 MG/DL
CHOLESTEROL/HDL RATIO: 3
GLUCOSE FASTING: 100 MG/DL
HDLC SERPL-MCNC: 76 MG/DL (ref 35–70)
LDL CHOLESTEROL CALCULATED: 126 MG/DL (ref 0–160)
TRIGL SERPL-MCNC: 121 MG/DL
VLDLC SERPL CALC-MCNC: ABNORMAL MG/DL

## 2019-08-23 RX ORDER — SODIUM CHLORIDE 0.9 % (FLUSH) 0.9 %
10 SYRINGE (ML) INJECTION EVERY 12 HOURS SCHEDULED
Status: CANCELLED | OUTPATIENT
Start: 2019-08-23

## 2019-08-25 ASSESSMENT — ENCOUNTER SYMPTOMS
TROUBLE SWALLOWING: 0
BACK PAIN: 0
BLOOD IN STOOL: 0
NAUSEA: 0
ABDOMINAL PAIN: 0
VOMITING: 0
SHORTNESS OF BREATH: 0
SORE THROAT: 0
COUGH: 0
CHOKING: 0

## 2019-08-26 NOTE — PATIENT INSTRUCTIONS
Patient Education        Learning About Colonoscopy  What is a colonoscopy? A colonoscopy is a test (also called a procedure) that lets a doctor look inside your large intestine. The doctor uses a thin, lighted tube called a colonoscope. The doctor uses it to look for small growths called polyps, colon or rectal cancer (colorectal cancer), or other problems like bleeding. During the procedure, the doctor can take samples of tissue. The samples can then be checked for cancer or other conditions. The doctor can also take out polyps. How is a colonoscopy done? This procedure is done in a doctor's office or a clinic or hospital. You will get medicine to help you relax and not feel pain. Some people find that they do not remember having the test because of the medicine. The doctor gently moves the colonoscope, or scope, through the colon. The scope is also a small video camera. It lets the doctor see the colon and take pictures. A colonoscopy usually takes 30 to 45 minutes. It may take longer if the doctor has to remove polyps. How do you prepare for the procedure? You need to clean out your colon before the procedure so the doctor can see all of your colon. You may start the cleaning process a day or two before the test. This depends on which \"colon prep\" your doctor recommends. To clean your colon, you stop eating solid foods and drink only clear liquids. You can have water, tea, coffee, clear juices, clear broths, flavored ice pops, and gelatin (such as Jell-O). Do not drink anything red or purple, such as grape juice or fruit punch. And do not eat red or purple foods, such as grape ice pops or cherry gelatin. The day or night before the procedure, you drink a large amount of a special liquid. This causes loose, frequent stools. You will go to the bathroom a lot. It is very important to drink all of the colon prep liquid. If you have problems drinking the liquid, call your doctor.   For many people, the

## 2019-08-27 ENCOUNTER — HOSPITAL ENCOUNTER (OUTPATIENT)
Age: 54
Setting detail: OUTPATIENT SURGERY
Discharge: HOME | End: 2019-08-27
Attending: SURGERY | Admitting: SURGERY
Payer: COMMERCIAL

## 2019-08-27 ENCOUNTER — ANESTHESIA EVENT (OUTPATIENT)
Dept: ENDOSCOPY | Age: 54
End: 2019-08-27
Payer: COMMERCIAL

## 2019-08-27 ENCOUNTER — ANESTHESIA (OUTPATIENT)
Dept: ENDOSCOPY | Age: 54
End: 2019-08-27
Payer: COMMERCIAL

## 2019-08-27 VITALS
HEART RATE: 50 BPM | OXYGEN SATURATION: 99 % | DIASTOLIC BLOOD PRESSURE: 71 MMHG | RESPIRATION RATE: 18 BRPM | TEMPERATURE: 98.2 F | SYSTOLIC BLOOD PRESSURE: 111 MMHG | BODY MASS INDEX: 20.8 KG/M2 | WEIGHT: 113 LBS | HEIGHT: 62 IN

## 2019-08-27 VITALS
RESPIRATION RATE: 10 BRPM | OXYGEN SATURATION: 100 % | DIASTOLIC BLOOD PRESSURE: 63 MMHG | SYSTOLIC BLOOD PRESSURE: 100 MMHG

## 2019-08-27 PROCEDURE — 2709999900 HC NON-CHARGEABLE SUPPLY: Performed by: SURGERY

## 2019-08-27 PROCEDURE — 3700000001 HC ADD 15 MINUTES (ANESTHESIA): Performed by: SURGERY

## 2019-08-27 PROCEDURE — 3700000000 HC ANESTHESIA ATTENDED CARE: Performed by: SURGERY

## 2019-08-27 PROCEDURE — 6360000002 HC RX W HCPCS: Performed by: NURSE ANESTHETIST, CERTIFIED REGISTERED

## 2019-08-27 PROCEDURE — 7100000010 HC PHASE II RECOVERY - FIRST 15 MIN: Performed by: SURGERY

## 2019-08-27 PROCEDURE — 7100000011 HC PHASE II RECOVERY - ADDTL 15 MIN: Performed by: SURGERY

## 2019-08-27 PROCEDURE — 2580000003 HC RX 258: Performed by: SURGERY

## 2019-08-27 PROCEDURE — 2500000003 HC RX 250 WO HCPCS: Performed by: NURSE ANESTHETIST, CERTIFIED REGISTERED

## 2019-08-27 PROCEDURE — 3609027000 HC COLONOSCOPY: Performed by: SURGERY

## 2019-08-27 RX ORDER — ONDANSETRON 2 MG/ML
4 INJECTION INTRAMUSCULAR; INTRAVENOUS EVERY 6 HOURS PRN
Status: DISCONTINUED | OUTPATIENT
Start: 2019-08-27 | End: 2019-08-27 | Stop reason: HOSPADM

## 2019-08-27 RX ORDER — GLYCOPYRROLATE 1 MG/5 ML
SYRINGE (ML) INTRAVENOUS PRN
Status: DISCONTINUED | OUTPATIENT
Start: 2019-08-27 | End: 2019-08-27 | Stop reason: SDUPTHER

## 2019-08-27 RX ORDER — MIDAZOLAM HYDROCHLORIDE 1 MG/ML
INJECTION INTRAMUSCULAR; INTRAVENOUS PRN
Status: DISCONTINUED | OUTPATIENT
Start: 2019-08-27 | End: 2019-08-27 | Stop reason: SDUPTHER

## 2019-08-27 RX ORDER — SODIUM CHLORIDE 0.9 % (FLUSH) 0.9 %
10 SYRINGE (ML) INJECTION EVERY 12 HOURS SCHEDULED
Status: DISCONTINUED | OUTPATIENT
Start: 2019-08-27 | End: 2019-08-27 | Stop reason: HOSPADM

## 2019-08-27 RX ORDER — SODIUM CHLORIDE 0.9 % (FLUSH) 0.9 %
10 SYRINGE (ML) INJECTION PRN
Status: DISCONTINUED | OUTPATIENT
Start: 2019-08-27 | End: 2019-08-27 | Stop reason: HOSPADM

## 2019-08-27 RX ORDER — PROPOFOL 10 MG/ML
INJECTION, EMULSION INTRAVENOUS PRN
Status: DISCONTINUED | OUTPATIENT
Start: 2019-08-27 | End: 2019-08-27 | Stop reason: SDUPTHER

## 2019-08-27 RX ORDER — SODIUM CHLORIDE, SODIUM LACTATE, POTASSIUM CHLORIDE, CALCIUM CHLORIDE 600; 310; 30; 20 MG/100ML; MG/100ML; MG/100ML; MG/100ML
INJECTION, SOLUTION INTRAVENOUS CONTINUOUS
Status: DISCONTINUED | OUTPATIENT
Start: 2019-08-27 | End: 2019-08-27 | Stop reason: HOSPADM

## 2019-08-27 RX ORDER — LIDOCAINE HYDROCHLORIDE 10 MG/ML
INJECTION, SOLUTION EPIDURAL; INFILTRATION; INTRACAUDAL; PERINEURAL PRN
Status: DISCONTINUED | OUTPATIENT
Start: 2019-08-27 | End: 2019-08-27 | Stop reason: SDUPTHER

## 2019-08-27 RX ORDER — PROPOFOL 10 MG/ML
INJECTION, EMULSION INTRAVENOUS CONTINUOUS PRN
Status: DISCONTINUED | OUTPATIENT
Start: 2019-08-27 | End: 2019-08-27 | Stop reason: SDUPTHER

## 2019-08-27 RX ORDER — 0.9 % SODIUM CHLORIDE 0.9 %
10 VIAL (ML) INJECTION PRN
Status: DISCONTINUED | OUTPATIENT
Start: 2019-08-27 | End: 2019-08-27 | Stop reason: HOSPADM

## 2019-08-27 RX ORDER — FENTANYL CITRATE 50 UG/ML
INJECTION, SOLUTION INTRAMUSCULAR; INTRAVENOUS PRN
Status: DISCONTINUED | OUTPATIENT
Start: 2019-08-27 | End: 2019-08-27 | Stop reason: SDUPTHER

## 2019-08-27 RX ADMIN — SODIUM CHLORIDE, POTASSIUM CHLORIDE, SODIUM LACTATE AND CALCIUM CHLORIDE: 600; 310; 30; 20 INJECTION, SOLUTION INTRAVENOUS at 08:03

## 2019-08-27 RX ADMIN — LIDOCAINE HYDROCHLORIDE 60 MG: 10 INJECTION, SOLUTION EPIDURAL; INFILTRATION; INTRACAUDAL; PERINEURAL at 09:34

## 2019-08-27 RX ADMIN — MIDAZOLAM HYDROCHLORIDE 2 MG: 2 INJECTION, SOLUTION INTRAMUSCULAR; INTRAVENOUS at 09:28

## 2019-08-27 RX ADMIN — PROPOFOL 100 MCG/KG/MIN: 10 INJECTION, EMULSION INTRAVENOUS at 09:35

## 2019-08-27 RX ADMIN — Medication 0.4 MG: at 09:35

## 2019-08-27 RX ADMIN — PROPOFOL 30 MG: 10 INJECTION, EMULSION INTRAVENOUS at 09:34

## 2019-08-27 RX ADMIN — FENTANYL CITRATE 100 MCG: 50 INJECTION, SOLUTION INTRAMUSCULAR; INTRAVENOUS at 09:28

## 2019-08-27 ASSESSMENT — PAIN SCALES - GENERAL
PAINLEVEL_OUTOF10: 0
PAINLEVEL_OUTOF10: 0

## 2019-08-27 ASSESSMENT — PAIN - FUNCTIONAL ASSESSMENT: PAIN_FUNCTIONAL_ASSESSMENT: 0-10

## 2019-08-27 NOTE — PROGRESS NOTES

## 2019-08-27 NOTE — ANESTHESIA POSTPROCEDURE EVALUATION
Department of Anesthesiology  Postprocedure Note    Patient: Irma Kate  MRN: [id number]  YOB: 1965  Date of evaluation: 8/27/2019  Time:  9:59 AM     Procedure Summary     Date:  08/27/19 Room / Location:  [facility] ENDOSCOPY    Anesthesia Start:  0930 Anesthesia Stop:  7283    Procedure:  COLONOSCOPY (N/A Abdomen) Diagnosis:  (SCREENING)    Surgeon:  Jess Parada MD Responsible Provider:  WENDY Wang CRNA    Anesthesia Type:  MAC ASA Status:  2          Anesthesia Type: MAC    Juan Luis Phase I: Juan Luis Score: 10    Juan Luis Phase II:      Last vitals: Reviewed and per EMR flowsheets.        Anesthesia Post Evaluation    Patient location during evaluation: bedside  Patient participation: complete - patient participated  Level of consciousness: awake and alert  Pain score: 0  Airway patency: patent  Nausea & Vomiting: no nausea and no vomiting  Complications: no  Cardiovascular status: blood pressure returned to baseline and hemodynamically stable  Respiratory status: acceptable, room air and spontaneous ventilation  Hydration status: euvolemic

## 2019-09-18 DIAGNOSIS — Z00.00 WELLNESS EXAMINATION: ICD-10-CM

## 2019-09-18 DIAGNOSIS — Z00.00 WELLNESS EXAMINATION: Primary | ICD-10-CM

## 2019-09-23 DIAGNOSIS — I10 ESSENTIAL HYPERTENSION, BENIGN: ICD-10-CM

## 2019-09-23 RX ORDER — HYDROCHLOROTHIAZIDE 25 MG/1
TABLET ORAL
Qty: 90 TABLET | Refills: 4 | Status: SHIPPED | OUTPATIENT
Start: 2019-09-23 | End: 2020-12-19

## 2019-10-08 ENCOUNTER — OFFICE VISIT (OUTPATIENT)
Dept: OBGYN CLINIC | Age: 54
End: 2019-10-08
Payer: COMMERCIAL

## 2019-10-08 ENCOUNTER — HOSPITAL ENCOUNTER (OUTPATIENT)
Age: 54
Setting detail: SPECIMEN
Discharge: HOME | End: 2019-10-08
Payer: COMMERCIAL

## 2019-10-08 VITALS
HEIGHT: 62 IN | BODY MASS INDEX: 21.35 KG/M2 | WEIGHT: 116 LBS | DIASTOLIC BLOOD PRESSURE: 66 MMHG | SYSTOLIC BLOOD PRESSURE: 107 MMHG

## 2019-10-08 DIAGNOSIS — Z12.31 ENCOUNTER FOR SCREENING MAMMOGRAM FOR BREAST CANCER: ICD-10-CM

## 2019-10-08 DIAGNOSIS — Z12.39 SCREENING FOR BREAST CANCER: ICD-10-CM

## 2019-10-08 DIAGNOSIS — Z01.419 WOMEN'S ANNUAL ROUTINE GYNECOLOGICAL EXAMINATION: ICD-10-CM

## 2019-10-08 DIAGNOSIS — Z01.419 WOMEN'S ANNUAL ROUTINE GYNECOLOGICAL EXAMINATION: Primary | ICD-10-CM

## 2019-10-08 DIAGNOSIS — M85.80 OSTEOPENIA DETERMINED BY X-RAY: ICD-10-CM

## 2019-10-08 PROCEDURE — G8484 FLU IMMUNIZE NO ADMIN: HCPCS | Performed by: OBSTETRICS & GYNECOLOGY

## 2019-10-08 PROCEDURE — 99396 PREV VISIT EST AGE 40-64: CPT | Performed by: OBSTETRICS & GYNECOLOGY

## 2019-10-08 PROCEDURE — G0145 SCR C/V CYTO,THINLAYER,RESCR: HCPCS

## 2019-10-08 RX ORDER — RALOXIFENE HYDROCHLORIDE 60 MG/1
60 TABLET, FILM COATED ORAL DAILY
Qty: 90 TABLET | Refills: 4 | Status: SHIPPED | OUTPATIENT
Start: 2019-10-08 | End: 2020-12-02

## 2019-10-14 ENCOUNTER — HOSPITAL ENCOUNTER (OUTPATIENT)
Dept: BONE DENSITY | Age: 54
Discharge: HOME | End: 2019-10-16
Payer: COMMERCIAL

## 2019-10-14 ENCOUNTER — OFFICE VISIT (OUTPATIENT)
Dept: FAMILY MEDICINE CLINIC | Age: 54
End: 2019-10-14
Payer: COMMERCIAL

## 2019-10-14 VITALS
TEMPERATURE: 98.9 F | DIASTOLIC BLOOD PRESSURE: 72 MMHG | BODY MASS INDEX: 21.4 KG/M2 | OXYGEN SATURATION: 98 % | WEIGHT: 117 LBS | HEART RATE: 66 BPM | SYSTOLIC BLOOD PRESSURE: 114 MMHG

## 2019-10-14 DIAGNOSIS — Z00.00 ANNUAL PHYSICAL EXAM: Primary | ICD-10-CM

## 2019-10-14 DIAGNOSIS — R41.3 MEMORY LOSS: ICD-10-CM

## 2019-10-14 DIAGNOSIS — Z13.29 SCREENING FOR THYROID DISORDER: ICD-10-CM

## 2019-10-14 DIAGNOSIS — E55.9 VITAMIN D DEFICIENCY: ICD-10-CM

## 2019-10-14 DIAGNOSIS — K14.4 SMOOTH TONGUE: ICD-10-CM

## 2019-10-14 DIAGNOSIS — Z13.1 SCREENING FOR DIABETES MELLITUS: ICD-10-CM

## 2019-10-14 DIAGNOSIS — Z13.0 SCREENING, ANEMIA, DEFICIENCY, IRON: ICD-10-CM

## 2019-10-14 DIAGNOSIS — I10 ESSENTIAL HYPERTENSION, BENIGN: ICD-10-CM

## 2019-10-14 DIAGNOSIS — Z13.220 SCREENING CHOLESTEROL LEVEL: ICD-10-CM

## 2019-10-14 DIAGNOSIS — M85.80 OSTEOPENIA DETERMINED BY X-RAY: ICD-10-CM

## 2019-10-14 PROCEDURE — 77080 DXA BONE DENSITY AXIAL: CPT

## 2019-10-14 PROCEDURE — 99396 PREV VISIT EST AGE 40-64: CPT | Performed by: NURSE PRACTITIONER

## 2019-10-14 PROCEDURE — G8484 FLU IMMUNIZE NO ADMIN: HCPCS | Performed by: NURSE PRACTITIONER

## 2019-10-14 RX ORDER — ATENOLOL 25 MG/1
25 TABLET ORAL DAILY
Qty: 90 TABLET | Refills: 3 | Status: SHIPPED | OUTPATIENT
Start: 2019-10-14 | End: 2020-06-15 | Stop reason: SDUPTHER

## 2019-10-14 ASSESSMENT — ENCOUNTER SYMPTOMS
CONSTIPATION: 0
DIARRHEA: 0

## 2019-10-15 LAB — CYTOLOGY REPORT: NORMAL

## 2019-10-20 ENCOUNTER — HOSPITAL ENCOUNTER (OUTPATIENT)
Age: 54
Discharge: HOME | End: 2019-10-20
Payer: COMMERCIAL

## 2019-10-20 DIAGNOSIS — Z00.00 ANNUAL PHYSICAL EXAM: ICD-10-CM

## 2019-10-20 DIAGNOSIS — R41.3 MEMORY LOSS: ICD-10-CM

## 2019-10-20 DIAGNOSIS — K14.4 SMOOTH TONGUE: ICD-10-CM

## 2019-10-20 DIAGNOSIS — Z13.220 SCREENING CHOLESTEROL LEVEL: ICD-10-CM

## 2019-10-20 DIAGNOSIS — Z13.29 SCREENING FOR THYROID DISORDER: ICD-10-CM

## 2019-10-20 DIAGNOSIS — E55.9 VITAMIN D DEFICIENCY: ICD-10-CM

## 2019-10-20 DIAGNOSIS — Z13.1 SCREENING FOR DIABETES MELLITUS: ICD-10-CM

## 2019-10-20 DIAGNOSIS — Z13.0 SCREENING, ANEMIA, DEFICIENCY, IRON: ICD-10-CM

## 2019-10-20 LAB
ABSOLUTE EOS #: 0.2 K/UL (ref 0–0.4)
ABSOLUTE IMMATURE GRANULOCYTE: ABNORMAL K/UL (ref 0–0.3)
ABSOLUTE LYMPH #: 1.7 K/UL (ref 1–4.8)
ABSOLUTE MONO #: 0.3 K/UL (ref 0–1)
ALBUMIN SERPL-MCNC: 4.5 G/DL (ref 3.5–5.2)
ALBUMIN/GLOBULIN RATIO: ABNORMAL (ref 1–2.5)
ALP BLD-CCNC: 61 U/L (ref 35–104)
ALT SERPL-CCNC: 14 U/L (ref 5–33)
ANION GAP SERPL CALCULATED.3IONS-SCNC: 12 MMOL/L (ref 9–17)
AST SERPL-CCNC: 17 U/L
BASOPHILS # BLD: 1 % (ref 0–2)
BASOPHILS ABSOLUTE: 0 K/UL (ref 0–0.2)
BILIRUB SERPL-MCNC: 0.39 MG/DL (ref 0.3–1.2)
BUN BLDV-MCNC: 17 MG/DL (ref 6–20)
BUN/CREAT BLD: 26 (ref 9–20)
CALCIUM SERPL-MCNC: 10.1 MG/DL (ref 8.6–10.4)
CHLORIDE BLD-SCNC: 102 MMOL/L (ref 98–107)
CHOLESTEROL/HDL RATIO: 3
CHOLESTEROL: 202 MG/DL
CO2: 25 MMOL/L (ref 20–31)
CREAT SERPL-MCNC: 0.66 MG/DL (ref 0.5–0.9)
DIFFERENTIAL TYPE: YES
EOSINOPHILS RELATIVE PERCENT: 6 % (ref 0–5)
GFR AFRICAN AMERICAN: >60 ML/MIN
GFR NON-AFRICAN AMERICAN: >60 ML/MIN
GFR SERPL CREATININE-BSD FRML MDRD: ABNORMAL ML/MIN/{1.73_M2}
GFR SERPL CREATININE-BSD FRML MDRD: ABNORMAL ML/MIN/{1.73_M2}
GLUCOSE BLD-MCNC: 128 MG/DL (ref 70–99)
HCT VFR BLD CALC: 37 % (ref 36–46)
HDLC SERPL-MCNC: 67 MG/DL
HEMOGLOBIN: 12.8 G/DL (ref 12–16)
IMMATURE GRANULOCYTES: ABNORMAL %
LDL CHOLESTEROL: 114 MG/DL (ref 0–130)
LYMPHOCYTES # BLD: 41 % (ref 15–40)
MCH RBC QN AUTO: 33 PG (ref 26–34)
MCHC RBC AUTO-ENTMCNC: 34.7 G/DL (ref 31–37)
MCV RBC AUTO: 95.1 FL (ref 80–100)
MONOCYTES # BLD: 7 % (ref 4–8)
NRBC AUTOMATED: ABNORMAL PER 100 WBC
PATIENT FASTING?: YES
PDW BLD-RTO: 13.1 % (ref 12.1–15.2)
PLATELET # BLD: 319 K/UL (ref 140–450)
PLATELET ESTIMATE: ABNORMAL
PMV BLD AUTO: ABNORMAL FL (ref 6–12)
POTASSIUM SERPL-SCNC: 3.9 MMOL/L (ref 3.7–5.3)
RBC # BLD: 3.89 M/UL (ref 4–5.2)
RBC # BLD: ABNORMAL 10*6/UL
SEG NEUTROPHILS: 45 % (ref 47–75)
SEGMENTED NEUTROPHILS ABSOLUTE COUNT: 1.9 K/UL (ref 2.5–7)
SODIUM BLD-SCNC: 139 MMOL/L (ref 135–144)
TOTAL PROTEIN: 7.5 G/DL (ref 6.4–8.3)
TRIGL SERPL-MCNC: 106 MG/DL
TSH SERPL DL<=0.05 MIU/L-ACNC: 2.77 MIU/L (ref 0.3–5)
VLDLC SERPL CALC-MCNC: ABNORMAL MG/DL (ref 1–30)
WBC # BLD: 4.1 K/UL (ref 3.5–11)
WBC # BLD: ABNORMAL 10*3/UL

## 2019-10-20 PROCEDURE — 82607 VITAMIN B-12: CPT

## 2019-10-20 PROCEDURE — 80053 COMPREHEN METABOLIC PANEL: CPT

## 2019-10-20 PROCEDURE — 82746 ASSAY OF FOLIC ACID SERUM: CPT

## 2019-10-20 PROCEDURE — 82306 VITAMIN D 25 HYDROXY: CPT

## 2019-10-20 PROCEDURE — 36415 COLL VENOUS BLD VENIPUNCTURE: CPT

## 2019-10-20 PROCEDURE — 84443 ASSAY THYROID STIM HORMONE: CPT

## 2019-10-20 PROCEDURE — 82300 ASSAY OF CADMIUM: CPT

## 2019-10-20 PROCEDURE — 80061 LIPID PANEL: CPT

## 2019-10-20 PROCEDURE — 83655 ASSAY OF LEAD: CPT

## 2019-10-20 PROCEDURE — 82175 ASSAY OF ARSENIC: CPT

## 2019-10-20 PROCEDURE — 83825 ASSAY OF MERCURY: CPT

## 2019-10-20 PROCEDURE — 85025 COMPLETE CBC W/AUTO DIFF WBC: CPT

## 2019-10-21 LAB
ARSENIC, BLOOD: NORMAL
CADMIUM: NORMAL
FOLATE: >20 NG/ML
LEAD BLOOD: 1 UG/DL (ref 0–4)
MERCURY, BLOOD: NORMAL
VITAMIN B-12: 644 PG/ML (ref 232–1245)
VITAMIN D 25-HYDROXY: 61.8 NG/ML (ref 30–100)

## 2019-10-23 ENCOUNTER — HOSPITAL ENCOUNTER (OUTPATIENT)
Age: 54
Discharge: HOME | End: 2019-10-23
Payer: COMMERCIAL

## 2019-10-23 PROCEDURE — 82175 ASSAY OF ARSENIC: CPT

## 2019-10-23 PROCEDURE — 82300 ASSAY OF CADMIUM: CPT

## 2019-10-23 PROCEDURE — 83825 ASSAY OF MERCURY: CPT

## 2019-10-26 LAB
ARSENIC, BLOOD: <10 UG/L (ref 0–12)
CADMIUM: <1 UG/L (ref 0–5)
LEAD BLOOD: NORMAL UG/DL
MERCURY, BLOOD: 3.6 UG/L (ref 0–10)

## 2020-01-08 ENCOUNTER — HOSPITAL ENCOUNTER (OUTPATIENT)
Dept: MAMMOGRAPHY | Age: 55
Discharge: HOME | End: 2020-01-10
Payer: COMMERCIAL

## 2020-01-08 PROCEDURE — 77067 SCR MAMMO BI INCL CAD: CPT

## 2020-05-06 ENCOUNTER — PATIENT MESSAGE (OUTPATIENT)
Dept: FAMILY MEDICINE CLINIC | Age: 55
End: 2020-05-06

## 2020-05-06 RX ORDER — ZOLMITRIPTAN 5 MG/1
5 TABLET, FILM COATED ORAL PRN
Qty: 30 TABLET | Refills: 1 | Status: SHIPPED | OUTPATIENT
Start: 2020-05-06 | End: 2021-10-29

## 2020-05-06 NOTE — TELEPHONE ENCOUNTER
I am okay filling pt's zomig medication , but with chronic conditions such as htn and migraines need to be seen every 6 months. Please schedule appt for review of these condition video or in office in next 1-2 months please and schedule physical in fall please.

## 2020-06-15 ENCOUNTER — OFFICE VISIT (OUTPATIENT)
Dept: FAMILY MEDICINE CLINIC | Age: 55
End: 2020-06-15
Payer: COMMERCIAL

## 2020-06-15 VITALS
BODY MASS INDEX: 20.85 KG/M2 | DIASTOLIC BLOOD PRESSURE: 70 MMHG | SYSTOLIC BLOOD PRESSURE: 110 MMHG | WEIGHT: 114 LBS | HEART RATE: 63 BPM | OXYGEN SATURATION: 98 %

## 2020-06-15 PROCEDURE — 1036F TOBACCO NON-USER: CPT | Performed by: NURSE PRACTITIONER

## 2020-06-15 PROCEDURE — G8427 DOCREV CUR MEDS BY ELIG CLIN: HCPCS | Performed by: NURSE PRACTITIONER

## 2020-06-15 PROCEDURE — G8420 CALC BMI NORM PARAMETERS: HCPCS | Performed by: NURSE PRACTITIONER

## 2020-06-15 PROCEDURE — 3017F COLORECTAL CA SCREEN DOC REV: CPT | Performed by: NURSE PRACTITIONER

## 2020-06-15 PROCEDURE — 99214 OFFICE O/P EST MOD 30 MIN: CPT | Performed by: NURSE PRACTITIONER

## 2020-06-15 RX ORDER — HYDROCHLOROTHIAZIDE 25 MG/1
TABLET ORAL
Qty: 90 TABLET | Refills: 4 | Status: CANCELLED | OUTPATIENT
Start: 2020-06-15

## 2020-06-15 RX ORDER — ATENOLOL 25 MG/1
25 TABLET ORAL DAILY
Qty: 90 TABLET | Refills: 3 | Status: SHIPPED | OUTPATIENT
Start: 2020-06-15 | End: 2021-05-25

## 2020-06-15 ASSESSMENT — ENCOUNTER SYMPTOMS
COUGH: 0
SHORTNESS OF BREATH: 0
ABDOMINAL DISTENTION: 0
BACK PAIN: 1
TROUBLE SWALLOWING: 0
EYES NEGATIVE: 1
SINUS PRESSURE: 0
SORE THROAT: 0

## 2020-06-15 NOTE — PROGRESS NOTES
Heart sounds: Normal heart sounds. No murmur. Pulmonary:      Effort: Pulmonary effort is normal. No respiratory distress. Breath sounds: Normal breath sounds. No wheezing. Abdominal:      Palpations: Abdomen is soft. Tenderness: There is no abdominal tenderness. Musculoskeletal: Normal range of motion. General: No tenderness. Lymphadenopathy:      Cervical: No cervical adenopathy. Skin:     General: Skin is warm and dry. Findings: No rash. Neurological:      Mental Status: She is alert and oriented to person, place, and time. Cranial Nerves: No cranial nerve deficit. Psychiatric:         Behavior: Behavior normal.         Thought Content: Thought content normal.         Judgment: Judgment normal.         ASSESSMENT/PLAN:  1. Essential hypertension, benign  Stable and controlled    - atenolol (TENORMIN) 25 MG tablet; Take 1 tablet by mouth daily  Dispense: 90 tablet; Refill: 3    2. Encounter for monitoring diuretic therapy    - Comprehensive Metabolic Panel; Future  - Uric Acid; Future    3. IFG (impaired fasting glucose)  Monitor with family hx and new onset fatigue  - Hemoglobin A1C; Future    4. Vitamin D deficiency  On supplement  - Vitamin D 25 Hydroxy; Future    5. Osteopenia of lower leg, unspecified laterality  On evista  - Vitamin D 25 Hydroxy; Future    6. Anxiety and depression  pristiq helping, will consider SSRI addition        No follow-ups on file. An electronic signature was used to authenticate this note.     --WENDY Ash - CNP on 6/16/2020 at 9:01 PM

## 2020-06-15 NOTE — Clinical Note
Pt wondering about adding viibryd to pristiq, I have not used that combo, ? SSRI recommended for bed use.?   Angel Renee

## 2020-06-18 ENCOUNTER — PATIENT MESSAGE (OUTPATIENT)
Dept: FAMILY MEDICINE CLINIC | Age: 55
End: 2020-06-18

## 2020-06-18 NOTE — TELEPHONE ENCOUNTER
From: Mynor Pulse  To: Emile Tanner, APRN - CNP  Sent: 6/18/2020 7:00 AM EDT  Subject: Prescription Question    Lonnie Merchant, just wondering if you made a decision on the Vibrid prescription. I will be stopping by the hospital in the morning to get my blood drawn. Thanks and stay healthy!  Liliana Pisano

## 2020-06-20 ENCOUNTER — HOSPITAL ENCOUNTER (OUTPATIENT)
Age: 55
Discharge: HOME | End: 2020-06-20
Payer: COMMERCIAL

## 2020-06-20 LAB
ALBUMIN SERPL-MCNC: 4.4 G/DL (ref 3.5–5.2)
ALBUMIN/GLOBULIN RATIO: ABNORMAL (ref 1–2.5)
ALP BLD-CCNC: 51 U/L (ref 35–104)
ALT SERPL-CCNC: 18 U/L (ref 5–33)
ANION GAP SERPL CALCULATED.3IONS-SCNC: 7 MMOL/L (ref 9–17)
AST SERPL-CCNC: 36 U/L
BILIRUB SERPL-MCNC: 0.31 MG/DL (ref 0.3–1.2)
BUN BLDV-MCNC: 16 MG/DL (ref 6–20)
BUN/CREAT BLD: 20 (ref 9–20)
CALCIUM SERPL-MCNC: 10.2 MG/DL (ref 8.6–10.4)
CHLORIDE BLD-SCNC: 105 MMOL/L (ref 98–107)
CO2: 27 MMOL/L (ref 20–31)
CREAT SERPL-MCNC: 0.81 MG/DL (ref 0.5–0.9)
GFR AFRICAN AMERICAN: >60 ML/MIN
GFR NON-AFRICAN AMERICAN: >60 ML/MIN
GFR SERPL CREATININE-BSD FRML MDRD: ABNORMAL ML/MIN/{1.73_M2}
GFR SERPL CREATININE-BSD FRML MDRD: ABNORMAL ML/MIN/{1.73_M2}
GLUCOSE BLD-MCNC: 122 MG/DL (ref 70–99)
POTASSIUM SERPL-SCNC: 4.2 MMOL/L (ref 3.7–5.3)
SODIUM BLD-SCNC: 139 MMOL/L (ref 135–144)
TOTAL PROTEIN: 7.5 G/DL (ref 6.4–8.3)
URIC ACID: 5.6 MG/DL (ref 2.4–5.7)
VITAMIN D 25-HYDROXY: 59.6 NG/ML (ref 30–100)

## 2020-06-20 PROCEDURE — 83036 HEMOGLOBIN GLYCOSYLATED A1C: CPT

## 2020-06-20 PROCEDURE — 82306 VITAMIN D 25 HYDROXY: CPT

## 2020-06-20 PROCEDURE — 84550 ASSAY OF BLOOD/URIC ACID: CPT

## 2020-06-20 PROCEDURE — 80053 COMPREHEN METABOLIC PANEL: CPT

## 2020-06-20 PROCEDURE — 36415 COLL VENOUS BLD VENIPUNCTURE: CPT

## 2020-06-21 LAB
ESTIMATED AVERAGE GLUCOSE: 100 MG/DL
HBA1C MFR BLD: 5.1 % (ref 4–6)

## 2020-06-21 RX ORDER — VILAZODONE HYDROCHLORIDE 10 MG/1
10 TABLET ORAL DAILY
Qty: 30 TABLET | Refills: 0 | Status: SHIPPED | OUTPATIENT
Start: 2020-06-21 | End: 2020-12-02

## 2020-06-25 RX ORDER — CITALOPRAM 10 MG/1
10 TABLET ORAL DAILY
Qty: 30 TABLET | Refills: 0 | Status: SHIPPED
Start: 2020-06-25 | End: 2020-07-20 | Stop reason: SINTOL

## 2020-07-20 RX ORDER — ESCITALOPRAM OXALATE 10 MG/1
10 TABLET ORAL DAILY
Qty: 30 TABLET | Refills: 0 | Status: SHIPPED | OUTPATIENT
Start: 2020-07-20 | End: 2020-12-14

## 2020-09-21 RX ORDER — DESVENLAFAXINE 100 MG/1
100 TABLET, EXTENDED RELEASE ORAL DAILY
Qty: 90 TABLET | Refills: 4 | Status: SHIPPED | OUTPATIENT
Start: 2020-09-21 | End: 2021-06-14 | Stop reason: SDUPTHER

## 2020-09-21 RX ORDER — DESVENLAFAXINE 100 MG/1
100 TABLET, EXTENDED RELEASE ORAL DAILY
Qty: 30 TABLET | Refills: 0 | Status: SHIPPED | OUTPATIENT
Start: 2020-09-21 | End: 2020-12-01

## 2020-09-21 NOTE — TELEPHONE ENCOUNTER
pristiq 100 mg    Would like a small supply to [facility]    Please also send to mail order to [facility]    Last check up 6/15/20    Health Maintenance   Topic Date Due    Hepatitis C screen  1965    HIV screen  06/30/1978    DTaP/Tdap/Td vaccine (1 - Tdap) 06/30/1982    Shingles Vaccine (1 of 2) 06/30/2013    Flu vaccine (1) 09/01/2020    Potassium monitoring  06/20/2021    Creatinine monitoring  06/20/2021    Breast cancer screen  01/08/2022    Cervical cancer screen  10/08/2022    Lipid screen  10/20/2024    Colon cancer screen colonoscopy  08/27/2029    Hepatitis A vaccine  Aged Out    Hepatitis B vaccine  Aged Out    Hib vaccine  Aged Out    Meningococcal (ACWY) vaccine  Aged Out    Pneumococcal 0-64 years Vaccine  Aged Out             (applicable per patient's age: Cancer Screenings, Depression Screening, Fall Risk Screening, Immunizations)    Hemoglobin A1C (%)   Date Value   06/20/2020 5.1     LDL Cholesterol (mg/dL)   Date Value   10/20/2019 114     LDL Calculated (mg/dL)   Date Value   08/19/2019 126     AST (U/L)   Date Value   06/20/2020 36 (H)     ALT (U/L)   Date Value   06/20/2020 18     BUN (mg/dL)   Date Value   06/20/2020 16      (goal A1C is < 7)   (goal LDL is <100) need 30-50% reduction from baseline     BP Readings from Last 3 Encounters:   06/15/20 110/70   10/14/19 114/72   10/08/19 107/66    (goal /80)      All Future Testing planned in [facility]:  Lab Frequency Next Occurrence   Urinalysis Reflex to Culture Once 10/14/2020       Next Visit Date:  Future Appointments   Date Time Provider Ricky Parsons   12/14/2020  3:30 PM WENDY Judd - CNP [facility]            Patient Active Problem List:     Essential hypertension, benign     Classic migraine     Dysthymic disorder

## 2020-09-25 RX ORDER — DESVENLAFAXINE 100 MG/1
100 TABLET, EXTENDED RELEASE ORAL DAILY
Qty: 90 TABLET | Refills: 4 | OUTPATIENT
Start: 2020-09-25

## 2020-12-01 ENCOUNTER — OFFICE VISIT (OUTPATIENT)
Dept: PRIMARY CARE CLINIC | Age: 55
End: 2020-12-01
Payer: COMMERCIAL

## 2020-12-01 VITALS
TEMPERATURE: 99.3 F | DIASTOLIC BLOOD PRESSURE: 70 MMHG | HEIGHT: 62 IN | HEART RATE: 64 BPM | WEIGHT: 112 LBS | OXYGEN SATURATION: 98 % | SYSTOLIC BLOOD PRESSURE: 120 MMHG | BODY MASS INDEX: 20.61 KG/M2

## 2020-12-01 PROCEDURE — G8420 CALC BMI NORM PARAMETERS: HCPCS | Performed by: NURSE PRACTITIONER

## 2020-12-01 PROCEDURE — 1036F TOBACCO NON-USER: CPT | Performed by: NURSE PRACTITIONER

## 2020-12-01 PROCEDURE — G8484 FLU IMMUNIZE NO ADMIN: HCPCS | Performed by: NURSE PRACTITIONER

## 2020-12-01 PROCEDURE — 99213 OFFICE O/P EST LOW 20 MIN: CPT | Performed by: NURSE PRACTITIONER

## 2020-12-01 PROCEDURE — 3017F COLORECTAL CA SCREEN DOC REV: CPT | Performed by: NURSE PRACTITIONER

## 2020-12-01 PROCEDURE — G8427 DOCREV CUR MEDS BY ELIG CLIN: HCPCS | Performed by: NURSE PRACTITIONER

## 2020-12-01 RX ORDER — FAMCICLOVIR 500 MG/1
500 TABLET, FILM COATED ORAL 3 TIMES DAILY
Qty: 21 TABLET | Refills: 0 | Status: SHIPPED | OUTPATIENT
Start: 2020-12-01 | End: 2020-12-08

## 2020-12-01 RX ORDER — CLINDAMYCIN HYDROCHLORIDE 300 MG/1
300 CAPSULE ORAL 3 TIMES DAILY
Qty: 21 CAPSULE | Refills: 0 | Status: SHIPPED | OUTPATIENT
Start: 2020-12-01 | End: 2020-12-08

## 2020-12-01 NOTE — PROGRESS NOTES
2020      Blane Chowdhury (:  1965) is a 60 y.o. female, here for evaluation of the following medical concerns:    HPI      On retinol facial scrub for years and taking dermatology treatments called  ITL treatment for sun damage    Lactic acid 10% also used for last 2 months on face AND  Bimatoprost ophthalmic solution careprost 0.03%     2020 pt with onset of erythema on face. Itching, no headache. Swelling started on  night. Used ice yesterday which made it worse. Itches a lot eyeball irritated. No fever or headache. Review of Systems   Constitutional: Negative for activity change, chills and fever. HENT: Positive for facial swelling. Negative for congestion, nosebleeds and rhinorrhea. Eyes: Negative. Respiratory: Negative for cough, chest tightness and shortness of breath. Cardiovascular: Negative for chest pain. Endocrine: Negative for cold intolerance and heat intolerance. Genitourinary: Negative for difficulty urinating. Skin: Rash: left side face. Neurological: Negative for headaches. Prior to Visit Medications    Medication Sig Taking?  Authorizing Provider   famciclovir (FAMVIR) 500 MG tablet Take 1 tablet by mouth 3 times daily for 7 days Herpes simplex Yes WENDY Tineo CNP   clindamycin (CLEOCIN) 300 MG capsule Take 1 capsule by mouth 3 times daily for 7 days Early erysipelas Yes WENDY Tineo CNP   desvenlafaxine succinate (PRISTIQ) 100 MG TB24 extended release tablet Take 1 tablet by mouth daily Yes WENDY Tineo CNP   escitalopram (LEXAPRO) 10 MG tablet Take 1 tablet by mouth daily In am for depression Yes WENDY Tineo CNP   atenolol (TENORMIN) 25 MG tablet Take 1 tablet by mouth daily Yes WENDY Tineo CNP   ZOLMitriptan (ZOMIG) 5 MG tablet Take 1 tablet by mouth as needed for Migraine Yes WENDY Tineo CNP   hydrochlorothiazide (HYDRODIURIL) 25 MG tablet TAKE 1 TABLET DAILY Yes Lizz Brazil Celine Wing, APRN - CNP   NONFORMULARY Indications: Calcium takes 1000 a day Yes Historical Provider, MD   Cholecalciferol (VITAMIN D3) 2000 UNITS CAPS Take by mouth Indications: takes 1 a day Yes Historical Provider, MD   NONFORMULARY Indications: Orthomega fish oil 1 capsule Yes Historical Provider, MD   vilazodone HCl (VILAZODONE HCL) 10 MG TABS Take 1 tablet by mouth daily  Patient not taking: Reported on 12/1/2020  Bhavesh Armenta, APRN - CNP   raloxifene (EVISTA) 60 MG tablet Take 1 tablet by mouth daily  Patient not taking: Reported on 12/1/2020  See Castaneda MD   NONFORMULARY Indications: PhytoMulti 1 tablet daily  Historical Provider, MD        Social History     Tobacco Use    Smoking status: Never Smoker    Smokeless tobacco: Never Used   Substance Use Topics    Alcohol use: Yes     Alcohol/week: 3.0 standard drinks     Types: 3 Shots of liquor per week     Comment: occas. Vitals:    12/01/20 1626   BP: 120/70   Pulse: 64   Temp: 99.3 °F (37.4 °C)   SpO2: 98%   Weight: 112 lb (50.8 kg)   Height: 5' 2\" (1.575 m)     Estimated body mass index is 20.49 kg/m² as calculated from the following:    Height as of this encounter: 5' 2\" (1.575 m). Weight as of this encounter: 112 lb (50.8 kg). Physical Exam  Constitutional:       Appearance: Normal appearance. HENT:      Head: Normocephalic. Nose: No congestion. Mouth/Throat:      Mouth: Mucous membranes are moist.   Eyes:      General: Lids are normal. Lids are everted, no foreign bodies appreciated. Vision grossly intact. Gaze aligned appropriately. No scleral icterus. Right eye: No foreign body, discharge or hordeolum. Left eye: No foreign body, discharge or hordeolum. Extraocular Movements:      Right eye: Normal extraocular motion and no nystagmus. Left eye: Normal extraocular motion and no nystagmus. Conjunctiva/sclera:      Right eye: Right conjunctiva is not injected.  No chemosis, exudate or

## 2020-12-01 NOTE — PATIENT INSTRUCTIONS
Patient Education        Cold Sores: Care Instructions  Your Care Instructions  Cold sores are clusters of small blisters on the lip and skin around or inside the mouth. Often the first sign of a cold sore is a spot that tingles, burns, or itches. A blister usually forms within 24 hours. The skin around the blisters can be red and inflamed. The blisters can break open, weep a clear fluid, and then scab over after a few days. Cold sores most often heal in 7 to 10 days without a scar. They are sometimes called fever blisters. Cold sores are caused by a virus called the herpes simplex virus. This virus also causes some cases of genital herpes. The virus can spread from a sore in the genital area to the lips. Or it can spread from a cold sore on the lips to the genital area. Cold sores most often go away on their own. But if they are severe, embarrass you, or cause pain, your doctor may prescribe antiviral medicine to relieve pain and help prevent outbreaks. Follow-up care is a key part of your treatment and safety. Be sure to make and go to all appointments, and call your doctor if you are having problems. It's also a good idea to know your test results and keep a list of the medicines you take. How can you care for yourself at home? · Wash your hands often. And try not to touch your cold sores. This will help to avoid spreading the virus to your eyes or genital area, or to other people. This is more likely to happen if this is your first cold sore outbreak. · Place ice or a cool, wet towel on the sores 3 times a day. Do this for 20 minutes each time. It may help to reduce redness and swelling. · If you are just getting a cold sore, try over-the-counter docosanol (Abreva) cream to reduce symptoms. · If your doctor prescribed antiviral medicine to relieve pain and help prevent outbreaks, be sure to follow the directions.   · Take an over-the-counter pain medicine, such as acetaminophen (Tylenol), ibuprofen (Advil, Motrin), or naproxen (Aleve), as needed. Read and follow all instructions on the label. · Do not take two or more pain medicines at the same time unless the doctor told you to. Many pain medicines have acetaminophen, which is Tylenol. Too much acetaminophen (Tylenol) can be harmful. · Avoid citrus fruit, tomatoes, and other foods that contain acid. · Use over-the-counter ointments to numb sore areas in the mouth or on the lips. These include Orajel and Anbesol. · Do not kiss or have oral sex with anyone while you have a cold sore. To prevent cold sores in the future  · Avoid long exposure of your lips to the sun. (Wear a hat to help shade your mouth.)  · Do not kiss or have oral sex with someone who has a cold sore. Do not have sex or oral sex with someone who has a genital herpes outbreak. · Using lip balm that contains sunscreen may help reduce outbreaks of cold sores. · Do not share towels, razors, silverware, toothbrushes, or other objects with a person who has a cold sore. When should you call for help? Call your doctor now or seek immediate medical care if:    · Your symptoms are painful and you want to try antiviral medicine.     · You have signs of infection, such as:  ? Increased pain, swelling, warmth, or redness. ? Red streaks leading from a cold sore. ? Pus draining from a cold sore. ? A fever.     · You have a cold sore and develop eye pain, eye discharge, or any changes in your vision. Watch closely for changes in your health, and be sure to contact your doctor if:    · The cold sore does not heal in 7 to 10 days.     · You get cold sores often. Where can you learn more? Go to https://Infochimpspepiceweb.[employer]. org and sign in to your Site Tour account. Enter J497 in the [employer] box to learn more about \"Cold Sores: Care Instructions. \"     If you do not have an account, please click on the \"Sign Up Now\" link.   Current as of: February 26, 2020               Content Version: 12.6  © 2858-9219 [employer]. Care instructions adapted under license by [facility] ([facility]). If you have questions about a medical condition or this instruction, always ask your healthcare professional. [address] any warranty or liability for your use of this information. Patient Education        Cellulitis: Care Instructions  Your Care Instructions     Cellulitis is a skin infection caused by bacteria, most often strep or staph. It often occurs after a break in the skin from a scrape, cut, bite, or puncture, or after a rash. Cellulitis may be treated without doing tests to find out what caused it. But your doctor may do tests, if needed, to look for a specific bacteria, like methicillin-resistant Staphylococcus aureus (MRSA). The doctor has checked you carefully, but problems can develop later. If you notice any problems or new symptoms, get medical treatment right away. Follow-up care is a key part of your treatment and safety. Be sure to make and go to all appointments, and call your doctor if you are having problems. It's also a good idea to know your test results and keep a list of the medicines you take. How can you care for yourself at home? · Take your antibiotics as directed. Do not stop taking them just because you feel better. You need to take the full course of antibiotics. · Prop up the infected area on pillows to reduce pain and swelling. Try to keep the area above the level of your heart as often as you can. · If your doctor told you how to care for your wound, follow your doctor's instructions. If you did not get instructions, follow this general advice:  ? Wash the wound with clean water 2 times a day. Don't use hydrogen peroxide or alcohol, which can slow healing. ? You may cover the wound with a thin layer of petroleum jelly, such as Vaseline, and a nonstick bandage. ?  Apply more petroleum jelly and replace the bandage as needed. · Be safe with medicines. Take pain medicines exactly as directed. ? If the doctor gave you a prescription medicine for pain, take it as prescribed. ? If you are not taking a prescription pain medicine, ask your doctor if you can take an over-the-counter medicine. To prevent cellulitis in the future  · Try to prevent cuts, scrapes, or other injuries to your skin. Cellulitis most often occurs where there is a break in the skin. · If you get a scrape, cut, mild burn, or bite, wash the wound with clean water as soon as you can to help avoid infection. Don't use hydrogen peroxide or alcohol, which can slow healing. · If you have swelling in your legs (edema), support stockings and good skin care may help prevent leg sores and cellulitis. · Take care of your feet, especially if you have diabetes or other conditions that increase the risk of infection. Wear shoes and socks. Do not go barefoot. If you have athlete's foot or other skin problems on your feet, talk to your doctor about how to treat them. When should you call for help? Call your doctor now or seek immediate medical care if:    · You have signs that your infection is getting worse, such as:  ? Increased pain, swelling, warmth, or redness. ? Red streaks leading from the area. ? Pus draining from the area. ? A fever.     · You get a rash. Watch closely for changes in your health, and be sure to contact your doctor if:    · You do not get better as expected. Where can you learn more? Go to https://SnapfishjasperCiplex.[employer]. org and sign in to your [employer] account. Enter N835 in the [facility] box to learn more about \"Cellulitis: Care Instructions. \"     If you do not have an account, please click on the \"Sign Up Now\" link. Current as of: July 2, 2020               Content Version: 12.6  © 2006-2020 [employer]. Care instructions adapted under license by [facility] ([facility]).  If you have questions about a medical condition or this instruction, always ask your healthcare professional. [place] [address] any warranty or liability for your use of this information.

## 2020-12-02 ENCOUNTER — HOSPITAL ENCOUNTER (EMERGENCY)
Age: 55
Discharge: HOME | End: 2020-12-02
Attending: FAMILY MEDICINE
Payer: COMMERCIAL

## 2020-12-02 VITALS
DIASTOLIC BLOOD PRESSURE: 69 MMHG | OXYGEN SATURATION: 97 % | SYSTOLIC BLOOD PRESSURE: 117 MMHG | WEIGHT: 112 LBS | BODY MASS INDEX: 20.61 KG/M2 | RESPIRATION RATE: 20 BRPM | HEIGHT: 62 IN | TEMPERATURE: 97.4 F | HEART RATE: 82 BPM

## 2020-12-02 LAB
ABSOLUTE EOS #: 0.3 K/UL (ref 0–0.4)
ABSOLUTE IMMATURE GRANULOCYTE: ABNORMAL K/UL (ref 0–0.3)
ABSOLUTE LYMPH #: 1.2 K/UL (ref 1–4.8)
ABSOLUTE MONO #: 0.4 K/UL (ref 0–1)
ANION GAP SERPL CALCULATED.3IONS-SCNC: 11 MMOL/L (ref 9–17)
BASOPHILS # BLD: 1 % (ref 0–2)
BASOPHILS ABSOLUTE: 0 K/UL (ref 0–0.2)
BUN BLDV-MCNC: 12 MG/DL (ref 6–20)
BUN/CREAT BLD: 17 (ref 9–20)
CALCIUM SERPL-MCNC: 10 MG/DL (ref 8.6–10.4)
CHLORIDE BLD-SCNC: 98 MMOL/L (ref 98–107)
CO2: 28 MMOL/L (ref 20–31)
CREAT SERPL-MCNC: 0.69 MG/DL (ref 0.5–0.9)
DIFFERENTIAL TYPE: YES
EOSINOPHILS RELATIVE PERCENT: 5 % (ref 0–5)
GFR AFRICAN AMERICAN: >60 ML/MIN
GFR NON-AFRICAN AMERICAN: >60 ML/MIN
GFR SERPL CREATININE-BSD FRML MDRD: ABNORMAL ML/MIN/{1.73_M2}
GFR SERPL CREATININE-BSD FRML MDRD: ABNORMAL ML/MIN/{1.73_M2}
GLUCOSE BLD-MCNC: 114 MG/DL (ref 70–99)
HCT VFR BLD CALC: 35.5 % (ref 36–46)
HEMOGLOBIN: 12.1 G/DL (ref 12–16)
IMMATURE GRANULOCYTES: ABNORMAL %
LYMPHOCYTES # BLD: 19 % (ref 15–40)
MCH RBC QN AUTO: 32.6 PG (ref 26–34)
MCHC RBC AUTO-ENTMCNC: 34.2 G/DL (ref 31–37)
MCV RBC AUTO: 95.4 FL (ref 80–100)
MONOCYTES # BLD: 7 % (ref 4–8)
NRBC AUTOMATED: ABNORMAL PER 100 WBC
PDW BLD-RTO: 12.8 % (ref 12.1–15.2)
PLATELET # BLD: 334 K/UL (ref 140–450)
PLATELET ESTIMATE: ABNORMAL
PMV BLD AUTO: ABNORMAL FL (ref 6–12)
POTASSIUM SERPL-SCNC: 4 MMOL/L (ref 3.7–5.3)
RBC # BLD: 3.72 M/UL (ref 4–5.2)
RBC # BLD: ABNORMAL 10*6/UL
SEG NEUTROPHILS: 68 % (ref 47–75)
SEGMENTED NEUTROPHILS ABSOLUTE COUNT: 4.4 K/UL (ref 2.5–7)
SODIUM BLD-SCNC: 137 MMOL/L (ref 135–144)
WBC # BLD: 6.4 K/UL (ref 3.5–11)
WBC # BLD: ABNORMAL 10*3/UL

## 2020-12-02 PROCEDURE — 85025 COMPLETE CBC W/AUTO DIFF WBC: CPT

## 2020-12-02 PROCEDURE — 80048 BASIC METABOLIC PNL TOTAL CA: CPT

## 2020-12-02 PROCEDURE — 2500000003 HC RX 250 WO HCPCS: Performed by: FAMILY MEDICINE

## 2020-12-02 PROCEDURE — 96365 THER/PROPH/DIAG IV INF INIT: CPT

## 2020-12-02 PROCEDURE — 99283 EMERGENCY DEPT VISIT LOW MDM: CPT

## 2020-12-02 PROCEDURE — 36415 COLL VENOUS BLD VENIPUNCTURE: CPT

## 2020-12-02 RX ORDER — CLINDAMYCIN PHOSPHATE 900 MG/50ML
900 INJECTION INTRAVENOUS ONCE
Status: COMPLETED | OUTPATIENT
Start: 2020-12-02 | End: 2020-12-02

## 2020-12-02 RX ADMIN — CLINDAMYCIN PHOSPHATE 900 MG: 900 INJECTION, SOLUTION INTRAVENOUS at 11:31

## 2020-12-02 ASSESSMENT — ENCOUNTER SYMPTOMS
RHINORRHEA: 0
SHORTNESS OF BREATH: 0
FACIAL SWELLING: 1
EYES NEGATIVE: 1
FACIAL SWELLING: 1
COUGH: 0
CHEST TIGHTNESS: 0

## 2020-12-02 ASSESSMENT — PAIN DESCRIPTION - LOCATION: LOCATION: FACE

## 2020-12-02 ASSESSMENT — PAIN DESCRIPTION - FREQUENCY: FREQUENCY: CONTINUOUS

## 2020-12-02 ASSESSMENT — PAIN DESCRIPTION - PAIN TYPE: TYPE: ACUTE PAIN

## 2020-12-02 ASSESSMENT — PAIN SCALES - GENERAL: PAINLEVEL_OUTOF10: 3

## 2020-12-02 ASSESSMENT — PAIN DESCRIPTION - DESCRIPTORS: DESCRIPTORS: THROBBING

## 2020-12-02 ASSESSMENT — PAIN DESCRIPTION - ORIENTATION: ORIENTATION: LEFT

## 2020-12-02 ASSESSMENT — VISUAL ACUITY: OU: 1

## 2020-12-02 NOTE — ED PROVIDER NOTES
[address] [facility]  eMERGENCY dEPARTMENT eNCOUnter          [address] [facility]       Chief Complaint   Patient presents with    Rash     left side of face       Nurses Notes reviewed and I agree except as noted in the HPI. HISTORY OF PRESENT ILLNESS    Awais Nguyen is a 60 y.o. female who presents to the emergency room via private vehicle, patient complaining of redness to the left side of her face. Patient had an IPL treatment for sun damaged skin, and has had these in the past without any type of significant reaction. Patient did begin to have a blister in her left cheek, and since at times had redness spread on the left cheek and into her forehead, also swelling of the left eye. Patient does have some discomfort with her left eye but no change in vision. No drainage from the eye. Patient has not put any type of chemical or other product on the skin since the treatment. Patient was seen by PCP yesterday, who had concerns for potential infection, bacterial versus viral, patient started on clindamycin and antiviral medication, however fell this morning at the redness had increased in contact her PCP who advised to go to the emergency room. Prior to patient arrival, patient's PCP, KATELYN Nugent, did contact emergency room, did discuss patient having IPL treatment to her face, discussed her evaluation yesterday and treatment, and sent to the emergency room today for additional evaluation and possible IV antibiotics. REVIEW OF SYSTEMS     Review of Systems   Constitutional: Negative for fever. HENT: Positive for facial swelling. Eyes: Negative for visual disturbance. Skin: Positive for rash. PAST MEDICAL HISTORY    has a past medical history of ASCUS of cervix with negative high risk HPV, Encounter for screening colonoscopy, Endometriosis, Hypertension, Migraine, and Osteopenia.     SURGICAL HISTORY      has a past surgical history that includes laparoscopy; Colonoscopy; back surgery; and Colonoscopy (N/A, 2019). CURRENT MEDICATIONS       Current Discharge Medication List      CONTINUE these medications which have NOT CHANGED    Details   famciclovir (FAMVIR) 500 MG tablet Take 1 tablet by mouth 3 times daily for 7 days Herpes simplex  Qty: 21 tablet, Refills: 0    Associated Diagnoses: Herpes simplex      clindamycin (CLEOCIN) 300 MG capsule Take 1 capsule by mouth 3 times daily for 7 days Early erysipelas  Qty: 21 capsule, Refills: 0    Associated Diagnoses: Erysipelas      desvenlafaxine succinate (PRISTIQ) 100 MG TB24 extended release tablet Take 1 tablet by mouth daily  Qty: 90 tablet, Refills: 4    Associated Diagnoses: Anxiety and depression      escitalopram (LEXAPRO) 10 MG tablet Take 1 tablet by mouth daily In am for depression  Qty: 30 tablet, Refills: 0    Comments: Stopped celexa due to insomnia      atenolol (TENORMIN) 25 MG tablet Take 1 tablet by mouth daily  Qty: 90 tablet, Refills: 3    Associated Diagnoses: Essential hypertension, benign      hydrochlorothiazide (HYDRODIURIL) 25 MG tablet TAKE 1 TABLET DAILY  Qty: 90 tablet, Refills: 4    Associated Diagnoses: Essential hypertension, benign      !! NONFORMULARY Indications: Calcium takes 1000 a day      Cholecalciferol (VITAMIN D3) 2000 UNITS CAPS Take by mouth Indications: takes 1 a day      !! NONFORMULARY Indications: Orthomega fish oil 1 capsule      ZOLMitriptan (ZOMIG) 5 MG tablet Take 1 tablet by mouth as needed for Migraine  Qty: 30 tablet, Refills: 1    Associated Diagnoses: Migraine with aura and with status migrainosus, not intractable       !! - Potential duplicate medications found. Please discuss with provider. ALLERGIES     is allergic to penicillins and boniva [ibandronic acid]. FAMILY HISTORY     She indicated that her mother is . She indicated that her father is . She indicated that her sister is alive. She indicated that her brother is alive.  She indicated that her maternal grandmother is . She indicated that her maternal grandfather is . She indicated that her paternal grandmother is . She indicated that her paternal grandfather is . family history includes Cancer in her father and mother; Diabetes in her brother and mother; Hypertension in her mother; No Known Problems in her sister. SOCIAL HISTORY      reports that she has never smoked. She has never used smokeless tobacco. She reports current alcohol use of about 3.0 standard drinks of alcohol per week. She reports that she does not use drugs. PHYSICAL EXAM     INITIAL VITALS:  height is 5' 2\" (1.575 m) and weight is 112 lb (50.8 kg). Her oral temperature is 97.4 °F (36.3 °C). Her blood pressure is 117/69 and her pulse is 82. Her respiration is 20 and oxygen saturation is 97%. Physical Exam   Constitutional: Patient is oriented to person, place, and time. Patient appears well-developed and well-nourished. Patient is active and cooperative. HENT:   Head: Normocephalic and atraumatic. Head is without contusion. Right Ear: Hearing and external ear normal. No drainage. Left Ear: Hearing and external ear normal. No drainage. Nose: Nose normal. No nasal deformity. No epistaxis. Mouth/Throat: Mucous membranes are not dry. Negative oral lesions or exudate  Eyes: EOMI. Conjunctivae and sclera are normal, left upper eyelid appears swollen mildly erythemic though there are no blisters or other aberration. Right eye exhibits no discharge. Left eye exhibits no discharge. OS pressure of 20    Fluorescein stain of the left eye did not show any gross corneal abrasion or dendritic formation under Woods lamp negative Boo's test    Neck: Full passive range of motion without pain and phonation normal.   Cardiovascular:  Normal rate, regular rhythm and intact distal pulses.      Pulses: Right radial pulse  2+   Pulmonary/Chest: Effort normal. No tachypnea and no start an IV and get some basic blood work on patient though I do acknowledge patient currently antibiotics may alter some lab results, and give patient dose of IV clindamycin in the interim. Patient resting bed semi-Fowlers with  present. EMR reviewed, including clinic note from NP Theresa Raya, with concerns for erysipelas vs HSV, including picture uploaded to EMR, patient started on clindamycin and famcyclovir. Lab work-up reviewed    Did perform a focused eye exam, including checking ocular pressures and fluorescein exam, refer to physical exam notes above    Discussed with patient and patient's  at this time I feel this is likely more of his facial cellulitis and less likely to be HSV related as it does cross dermatomes, however at this time it is not exclusively ruled out, discussed also some of the irritation may be secondary to the IPL treatment, I would not change her current medications including the clindamycin and famciclovir, would contact her dermatology group to let them know of a potential reaction, close follow-up with her established primary care, would use Motrin/Tylenol for any headaches and discomfort otherwise, would avoid any topical creams as patient has been using lactic acid underskin prior to the recent IPL treatment, return to ER if any symptoms change worse other concerns, acknowledged    FINAL IMPRESSION      1. Cellulitis, face          DISPOSITION/PLAN   D/c    PATIENT REFERRED TO:  No follow-up provider specified. DISCHARGE MEDICATIONS:  Current Discharge Medication List              Summation      Patient Course:  D/c    ED Medications administered this visit:    Medications   clindamycin (CLEOCIN) 900 mg in dextrose 5 % 50 mL IVPB (0 mg Intravenous Stopped 12/2/20 1230)       New Prescriptions from this visit:    Current Discharge Medication List          Follow-up:  No follow-up provider specified. Final Impression:   1.  Cellulitis, face (Please note that portions of this note were completed with a voice recognition program.  Efforts were made to edit the dictations but occasionally words are mis-transcribed.)    MD Jori Guallpa MD  12/02/20 6203

## 2020-12-03 ENCOUNTER — HOSPITAL ENCOUNTER (EMERGENCY)
Age: 55
Discharge: HOME | End: 2020-12-03
Attending: FAMILY MEDICINE
Payer: COMMERCIAL

## 2020-12-03 VITALS
RESPIRATION RATE: 18 BRPM | OXYGEN SATURATION: 100 % | HEART RATE: 60 BPM | SYSTOLIC BLOOD PRESSURE: 128 MMHG | DIASTOLIC BLOOD PRESSURE: 69 MMHG

## 2020-12-03 PROCEDURE — 99282 EMERGENCY DEPT VISIT SF MDM: CPT

## 2020-12-03 RX ORDER — TRAMADOL HYDROCHLORIDE 50 MG/1
50 TABLET ORAL EVERY 6 HOURS PRN
Qty: 20 TABLET | Refills: 0 | Status: SHIPPED | OUTPATIENT
Start: 2020-12-03 | End: 2020-12-08

## 2020-12-03 ASSESSMENT — PAIN DESCRIPTION - LOCATION: LOCATION: FACE

## 2020-12-03 ASSESSMENT — PAIN SCALES - GENERAL: PAINLEVEL_OUTOF10: 3

## 2020-12-03 ASSESSMENT — PAIN DESCRIPTION - PAIN TYPE: TYPE: ACUTE PAIN

## 2020-12-03 ASSESSMENT — ENCOUNTER SYMPTOMS: RESPIRATORY NEGATIVE: 1

## 2020-12-03 ASSESSMENT — PAIN DESCRIPTION - ORIENTATION: ORIENTATION: LEFT

## 2020-12-03 NOTE — ED PROVIDER NOTES
[facility] ED  EMERGENCY DEPARTMENT ENCOUNTER      Pt Name: Sheila Baez  MRN: [id number]  [place] 1965  Date of evaluation: 12/3/2020  Provider: Lidia Smith MD    CHIEF COMPLAINT       Chief Complaint   Patient presents with    Facial Swelling     Pt was here yesterday for facial swelling and was told if not better to return. HISTORY OF PRESENT ILLNESS   (Location/Symptom, Timing/Onset, Context/Setting, Quality, Duration, Modifying Factors, Severity)  Note limiting factors. Sheila Baez is a 60 y.o. female who presents to the emergency department going redness around her left eye. It initially started about a week ago with redness and small vesicles infraorbital.  Denies any changes actually in her vision or the eye itself in the surrounding skin of the eye. Also includes the left side of her forehead and does not cross midline. She was recently placed on Famvir and Cleocin which I think is very reasonable for suspected shingles. HPI    Nursing Notes were reviewed. REVIEW OF SYSTEMS    (2-9 systems for level 4, 10 or more for level 5)     Review of Systems   Constitutional: Negative for fever. Respiratory: Negative. Musculoskeletal: Negative. Skin: Positive for rash. All other systems reviewed and are negative. Except as noted above the remainder of the review of systems was reviewed and negative.        PAST MEDICAL HISTORY     Past Medical History:   Diagnosis Date    ASCUS of cervix with negative high risk HPV     Encounter for screening colonoscopy 12/3/2013    Endometriosis     Hypertension     Migraine     Osteopenia          SURGICAL HISTORY       Past Surgical History:   Procedure Laterality Date    BACK SURGERY      COLONOSCOPY      in her 25s    COLONOSCOPY N/A 8/27/2019    COLONOSCOPY Armin Schroeder MD =normal, repeat in 5-10 years (maternal hx polyps)    LAPAROSCOPY           CURRENT MEDICATIONS       Previous Medications ATENOLOL (TENORMIN) 25 MG TABLET    Take 1 tablet by mouth daily    CHOLECALCIFEROL (VITAMIN D3) 2000 UNITS CAPS    Take by mouth Indications: takes 1 a day    CLINDAMYCIN (CLEOCIN) 300 MG CAPSULE    Take 1 capsule by mouth 3 times daily for 7 days Early erysipelas    DESVENLAFAXINE SUCCINATE (PRISTIQ) 100 MG TB24 EXTENDED RELEASE TABLET    Take 1 tablet by mouth daily    ESCITALOPRAM (LEXAPRO) 10 MG TABLET    Take 1 tablet by mouth daily In am for depression    FAMCICLOVIR (FAMVIR) 500 MG TABLET    Take 1 tablet by mouth 3 times daily for 7 days Herpes simplex    HYDROCHLOROTHIAZIDE (HYDRODIURIL) 25 MG TABLET    TAKE 1 TABLET DAILY    NONFORMULARY    Indications: Orthomega fish oil 1 capsule    NONFORMULARY    Indications: Calcium takes 1000 a day    ZOLMITRIPTAN (ZOMIG) 5 MG TABLET    Take 1 tablet by mouth as needed for Migraine       ALLERGIES     Penicillins and Boniva [ibandronic acid]    FAMILY HISTORY       Family History   Problem Relation Age of Onset    Cancer Father         Non-Hodgkins Lymphoma    Diabetes Mother     Hypertension Mother     Cancer Mother         Liver Cancer    Diabetes Brother     No Known Problems Sister           SOCIAL HISTORY       Social History     Socioeconomic History    Marital status:      Spouse name: None    Number of children: None    Years of education: None    Highest education level: None   Occupational History    None   Social Needs    Financial resource strain: None    Food insecurity     Worry: None     Inability: None    Transportation needs     Medical: None     Non-medical: None   Tobacco Use    Smoking status: Never Smoker    Smokeless tobacco: Never Used   Substance and Sexual Activity    Alcohol use: Yes     Alcohol/week: 3.0 standard drinks     Types: 3 Shots of liquor per week     Comment: occas.     Drug use: No    Sexual activity: Yes     Partners: Male   Lifestyle    Physical activity     Days per week: None     Minutes per session: None    Stress: None   Relationships    Social connections     Talks on phone: None     Gets together: None     Attends Sikh service: None     Active member of club or organization: None     Attends meetings of clubs or organizations: None     Relationship status: None    Intimate partner violence     Fear of current or ex partner: None     Emotionally abused: None     Physically abused: None     Forced sexual activity: None   Other Topics Concern    None   Social History Narrative    None       SCREENINGS                        PHYSICAL EXAM    (up to 7 for level 4, 8 or more for level 5)     ED Triage Vitals [12/03/20 1012]   BP Temp Temp src Pulse Resp SpO2 Height Weight   128/69 -- -- 60 18 100 % -- --       Physical Exam  Vitals signs reviewed. Constitutional:       Appearance: She is not ill-appearing. HENT:      Head: Atraumatic. Nose: No congestion. Mouth/Throat:      Mouth: Mucous membranes are moist.      Pharynx: No posterior oropharyngeal erythema. Eyes:      General:         Right eye: No discharge. Left eye: No discharge. Pupils: Pupils are equal, round, and reactive to light. Comments: Periocular redness to goes up on the side of the nose but does not cross midline, also the left side of her forehead consistent t with shingles outbreak. There are no vesicles now to culture   Neck:      Musculoskeletal: Neck supple. No muscular tenderness. Cardiovascular:      Rate and Rhythm: Normal rate and regular rhythm. Heart sounds: Normal heart sounds. No murmur. Pulmonary:      Effort: Pulmonary effort is normal. No respiratory distress. Breath sounds: Normal breath sounds. Abdominal:      General: Abdomen is flat. Palpations: Abdomen is soft. Tenderness: There is no abdominal tenderness. Musculoskeletal:         General: No tenderness or signs of injury. Skin:     General: Skin is warm.       Capillary Refill: Capillary refill takes less than 2 seconds. Findings: No lesion or rash. Neurological:      General: No focal deficit present. Mental Status: She is alert and oriented to person, place, and time. Cranial Nerves: No cranial nerve deficit. Motor: No weakness. Psychiatric:         Mood and Affect: Mood normal.         Behavior: Behavior normal.         DIAGNOSTIC RESULTS     EKG: All EKG's are interpreted by the Emergency Department Physician who either signs or Co-signs this chart in the absence of a cardiologist.        RADIOLOGY:   Non-plain film images such as CT, Ultrasound and MRI are read by the radiologist. Plain radiographic images are visualized and preliminarily interpreted by the emergency physician with the below findings:        Interpretation per the Radiologist below, if available at the time of this note:    No orders to display         ED BEDSIDE ULTRASOUND:   Performed by ED Physician - none    LABS:  Labs Reviewed - No data to display    All other labs were within normal range or not returned as of this dictation. EMERGENCY DEPARTMENT COURSE and DIFFERENTIAL DIAGNOSIS/MDM:   Vitals:    Vitals:    12/03/20 1012   BP: 128/69   Pulse: 60   Resp: 18   SpO2: 100%           MDM  Number of Diagnoses or Management Options  Diagnosis management comments: Ophthalmic Branch shingles infection which appears to be subsiding, is not warm, there is no evidence of cellulitis, I think would be reasonable to finish off the Famvir and the clindamycin with a follow-up in about a week. She was given tramadol for pain        REASSESSMENT          CRITICAL CARE TIME       CONSULTS:  None    PROCEDURES:  Unless otherwise noted below, none     Procedures      FINAL IMPRESSION      1.  Herpes zoster without complication          DISPOSITION/PLAN   DISPOSITION Decision To Discharge 12/03/2020 11:24:40 AM      PATIENT REFERRED TO:  Loretta Reagan, APRN - CNP  [address] [facility] [place] [address]  [phone]    In 1 week        DISCHARGE MEDICATIONS:  New Prescriptions    TRAMADOL (ULTRAM) 50 MG TABLET    Take 1 tablet by mouth every 6 hours as needed for Pain for up to 5 days. Intended supply: 5 days. Take lowest dose possible to manage pain     Controlled Substances Monitoring:     No flowsheet data found.     (Please note that portions of this note were completed with a voice recognition program.  Efforts were made to edit the dictations but occasionally words are mis-transcribed.)    Patrica Weinberg MD (electronically signed)  Attending Emergency Physician           Patrica Weinberg MD  12/03/20 [phone]

## 2020-12-07 ENCOUNTER — TELEPHONE (OUTPATIENT)
Dept: FAMILY MEDICINE CLINIC | Age: 55
End: 2020-12-07

## 2020-12-07 RX ORDER — GABAPENTIN 100 MG/1
100 CAPSULE ORAL 3 TIMES DAILY
Qty: 42 CAPSULE | Refills: 0 | Status: SHIPPED | OUTPATIENT
Start: 2020-12-07 | End: 2021-06-14 | Stop reason: ALTCHOICE

## 2020-12-14 ENCOUNTER — OFFICE VISIT (OUTPATIENT)
Dept: FAMILY MEDICINE CLINIC | Age: 55
End: 2020-12-14
Payer: COMMERCIAL

## 2020-12-14 VITALS
OXYGEN SATURATION: 97 % | HEART RATE: 62 BPM | WEIGHT: 114 LBS | BODY MASS INDEX: 20.85 KG/M2 | DIASTOLIC BLOOD PRESSURE: 80 MMHG | SYSTOLIC BLOOD PRESSURE: 110 MMHG

## 2020-12-14 PROCEDURE — G8484 FLU IMMUNIZE NO ADMIN: HCPCS | Performed by: NURSE PRACTITIONER

## 2020-12-14 PROCEDURE — 1036F TOBACCO NON-USER: CPT | Performed by: NURSE PRACTITIONER

## 2020-12-14 PROCEDURE — 3017F COLORECTAL CA SCREEN DOC REV: CPT | Performed by: NURSE PRACTITIONER

## 2020-12-14 PROCEDURE — 99214 OFFICE O/P EST MOD 30 MIN: CPT | Performed by: NURSE PRACTITIONER

## 2020-12-14 PROCEDURE — G8427 DOCREV CUR MEDS BY ELIG CLIN: HCPCS | Performed by: NURSE PRACTITIONER

## 2020-12-14 PROCEDURE — G8420 CALC BMI NORM PARAMETERS: HCPCS | Performed by: NURSE PRACTITIONER

## 2020-12-14 RX ORDER — RALOXIFENE HYDROCHLORIDE 60 MG/1
60 TABLET, FILM COATED ORAL DAILY
COMMUNITY
End: 2020-12-30

## 2020-12-14 NOTE — PROGRESS NOTES
2020     Patricia Zhang (:  1965) is a 60 y.o. female, here for evaluation of the following medical concerns:    HPI     Recent skin infection and shingles to face area doing well, finished famvir medication and on gabapentin short term now. Finished antibiotic. Symptoms greatly improved. pristiq has been on since menopause and done well , stopped   evista for osteopenia, hx trial boniva which caused severe joint pain and muscle cramping. Hx pristiq stopped in  with pt developing severe back pain, surgery done for herniated disc L4-5 and L5-S1 lumbar scoliosis and retrolisthesis. Lumbar microdiscectomy done 10/2016 by Dr. Melecio Dillon pt with minimal improvement post op and chose to return to pristiq medication which did help.     htn- no headaches no dizziness, no chest pain. Remains on atenolol, hctz.    Wt Readings from Last 3 Encounters:   20 114 lb (51.7 kg)   20 112 lb (50.8 kg)   20 112 lb (50.8 kg)     Lab Results   Component Value Date    CHOL 202 (H) 10/20/2019    CHOL 226 2019    CHOL 215 (H) 2013     Lab Results   Component Value Date    TRIG 106 10/20/2019    TRIG 121 2019    TRIG 185 (H) 2013     Lab Results   Component Value Date    HDL 67 10/20/2019    HDL 76 (A) 2019    HDL 61 2013     Lab Results   Component Value Date    LDLCHOLESTEROL 114 10/20/2019    LDLCHOLESTEROL 118 2013    LDLCALC 126 2019     Lab Results   Component Value Date    VLDL NOT REPORTED 10/20/2019    VLDL 37 (H) 2013     Lab Results   Component Value Date    CHOLHDLRATIO 3.0 10/20/2019    CHOLHDLRATIO 3.0 2019    CHOLHDLRATIO 3.6 2013     Lab Results   Component Value Date     2020    K 4.0 2020    CL 98 2020    CO2 28 2020    BUN 12 2020    CREATININE 0.69 2020    GLUCOSE 114 (H) 2020    CALCIUM 10.0 2020    PROT 7.5 2020    LABALBU 4.4 2020 BILITOT 0.31 06/20/2020    ALKPHOS 51 06/20/2020    AST 36 (H) 06/20/2020    ALT 18 06/20/2020    LABGLOM >60 12/02/2020    GFRAA >60 12/02/2020       Pt c/o depression with some anxiety feels it is worse lately, lots of constant stressors: work busy assistant to superintendent at [employer], also helps manage rental properties with .  diabetic and trying to help him but he has bad knee gained wt and struggling for sugar control. Looks forward to enjoying time with young grandchildren. Does follow covid-19 pandemic precautions and feels limited by them. In [place] added celexa 10 mg daily pt took x 1 week had significant insomnia and stopped medication. Tried lexapro 10 mg po daily x 1 month did not feel it helped and stopped after 1 month.   vilazodone Hcl was not covered on insurance. With pt having difficulty with meds will as psychiatrist for expert opinion for medication advisement. And also encouraged counseling locally too. Pt agreeable. Review of Systems   Constitutional: Negative for activity change, chills and fever. HENT: Negative for congestion, postnasal drip and rhinorrhea. Healing facial blistered rash of shingles. Sensitive on forehead area on left. Eyes: Negative. Respiratory: Negative for cough, chest tightness and shortness of breath. Cardiovascular: Negative for chest pain and leg swelling. Gastrointestinal: Negative for abdominal distention. Endocrine: Negative for cold intolerance and heat intolerance. Genitourinary: Negative for difficulty urinating. Musculoskeletal: Positive for back pain. Negative for arthralgias. Skin: Negative for rash. Neurological: Negative for dizziness and headaches. Psychiatric/Behavioral: Positive for decreased concentration, dysphoric mood and sleep disturbance. Negative for self-injury and suicidal ideas. The patient is nervous/anxious.          Good eye contact       Prior to Visit Medications    Medication Sig Taking? Authorizing Provider   raloxifene (EVISTA) 60 MG tablet Take 60 mg by mouth daily Yes Historical Provider, MD   gabapentin (NEURONTIN) 100 MG capsule Take 1 capsule by mouth 3 times daily for 14 days. Shingles pain Yes WENDY Garner CNP   desvenlafaxine succinate (PRISTIQ) 100 MG TB24 extended release tablet Take 1 tablet by mouth daily Yes WENDY Garner CNP   atenolol (TENORMIN) 25 MG tablet Take 1 tablet by mouth daily Yes WENDY Garner CNP   ZOLMitriptan (ZOMIG) 5 MG tablet Take 1 tablet by mouth as needed for Migraine Yes WENDY Garner CNP   hydrochlorothiazide (HYDRODIURIL) 25 MG tablet TAKE 1 TABLET DAILY Yes WENDY Garner CNP   NONFORMULARY Indications: Calcium takes 1000 a day Yes Historical Provider, MD   Cholecalciferol (VITAMIN D3) 2000 UNITS CAPS Take by mouth Indications: takes 1 a day Yes Historical Provider, MD   NONFORMULARY Indications: Orthomega fish oil 1 capsule Yes Historical Provider, MD        Social History     Tobacco Use    Smoking status: Never Smoker    Smokeless tobacco: Never Used   Substance Use Topics    Alcohol use: Yes     Alcohol/week: 3.0 standard drinks     Types: 3 Shots of liquor per week     Comment: occas. Vitals:    12/14/20 1605   BP: 110/80   Site: Left Upper Arm   Position: Sitting   Cuff Size: Medium Adult   Pulse: 62   SpO2: 97%   Weight: 114 lb (51.7 kg)     Estimated body mass index is 20.85 kg/m² as calculated from the following:    Height as of 12/2/20: 5' 2\" (1.575 m). Weight as of this encounter: 114 lb (51.7 kg). Physical Exam  Vitals signs and nursing note reviewed. Constitutional:       General: She is not in acute distress. Appearance: Normal appearance. She is well-developed. HENT:      Head: Normocephalic and atraumatic.       Right Ear: Tympanic membrane and external ear normal.      Left Ear: Tympanic membrane and external ear normal.      Nose: No congestion. Mouth/Throat:      Mouth: Mucous membranes are moist.   Eyes:      General: No scleral icterus. Pupils: Pupils are equal, round, and reactive to light. Neck:      Musculoskeletal: Normal range of motion and neck supple. Vascular: No carotid bruit. Cardiovascular:      Rate and Rhythm: Normal rate and regular rhythm. Heart sounds: Normal heart sounds. No murmur. Pulmonary:      Effort: Pulmonary effort is normal. No respiratory distress. Breath sounds: Normal breath sounds. No wheezing. Abdominal:      Palpations: Abdomen is soft. Tenderness: There is no abdominal tenderness. Musculoskeletal: Normal range of motion. General: No tenderness. Right lower leg: No edema. Left lower leg: No edema. Lymphadenopathy:      Cervical: No cervical adenopathy. Skin:     General: Skin is warm and dry. Neurological:      Mental Status: She is alert and oriented to person, place, and time. Psychiatric:         Behavior: Behavior normal.         Thought Content: Thought content normal.         Judgment: Judgment normal.      Comments: Good eye contact, slightly tearful with conversation, future oriented lacks motivation, states frustration and feels \"just don't care\" denies any thoughts of self harm, no suicidal or homicidal ideations. ASSESSMENT/PLAN:  1. Essential hypertension, benign  Stable and controlled  Continue BB    - Comprehensive Metabolic Panel; Future    2. Anxiety and depression  Wavering control and feels depression worse with life stressors. No suicidal ideations. Rashid Montero MD, Psychiatry, [facility]    3. Screening for thyroid disorder    - TSH with Reflex; Future    4. Screening cholesterol level    - Lipid Panel; Future      Return in about 6 months (around 6/14/2021) for physical-insurance. An electronic signature was used to authenticate this note.     --WENDY Whiteside - CNP on 12/15/2020 at 8:13 PM

## 2020-12-14 NOTE — Clinical Note
Inform patient of phone number for psychiatry referral. Also make sure she has local counseling number and hotline.

## 2020-12-14 NOTE — PATIENT INSTRUCTIONS
You may be receiving a survey from [employer] regarding your visit today. You may get this in the mail, through your MyChart or in your email. Please complete the survey to enable us to provide the highest quality of care to you and your family. If you cannot score us as very good ( 5 Stars) on any question, please feel free to call the office to discuss how we could have made your experience exceptional.     Thank You!     WENDY Chan-ANDREW Jordan

## 2020-12-15 ASSESSMENT — ENCOUNTER SYMPTOMS
EYES NEGATIVE: 1
BACK PAIN: 1
RHINORRHEA: 0
ABDOMINAL DISTENTION: 0
CHEST TIGHTNESS: 0
SHORTNESS OF BREATH: 0
COUGH: 0

## 2020-12-19 RX ORDER — HYDROCHLOROTHIAZIDE 25 MG/1
TABLET ORAL
Qty: 90 TABLET | Refills: 3 | Status: SHIPPED | OUTPATIENT
Start: 2020-12-19 | End: 2021-12-27 | Stop reason: ALTCHOICE

## 2020-12-30 RX ORDER — RALOXIFENE HYDROCHLORIDE 60 MG/1
TABLET, FILM COATED ORAL
Qty: 90 TABLET | Refills: 3 | Status: SHIPPED | OUTPATIENT
Start: 2020-12-30 | End: 2021-11-09 | Stop reason: SDUPTHER

## 2020-12-31 ENCOUNTER — PATIENT MESSAGE (OUTPATIENT)
Dept: FAMILY MEDICINE CLINIC | Age: 55
End: 2020-12-31

## 2020-12-31 ENCOUNTER — HOSPITAL ENCOUNTER (OUTPATIENT)
Age: 55
Discharge: HOME | End: 2020-12-31
Payer: COMMERCIAL

## 2020-12-31 ENCOUNTER — TELEPHONE (OUTPATIENT)
Dept: PRIMARY CARE CLINIC | Age: 55
End: 2020-12-31

## 2020-12-31 DIAGNOSIS — A09 DIARRHEA OF INFECTIOUS ORIGIN: ICD-10-CM

## 2020-12-31 LAB
C DIFF AG + TOXIN: ABNORMAL
SPECIMEN DESCRIPTION: ABNORMAL

## 2020-12-31 PROCEDURE — 87324 CLOSTRIDIUM AG IA: CPT

## 2020-12-31 PROCEDURE — 87449 NOS EACH ORGANISM AG IA: CPT

## 2020-12-31 RX ORDER — VANCOMYCIN HYDROCHLORIDE 125 MG/1
125 CAPSULE ORAL 4 TIMES DAILY
Qty: 56 CAPSULE | Refills: 0
Start: 2020-12-31 | End: 2021-01-14

## 2020-12-31 NOTE — TELEPHONE ENCOUNTER
From: Lulu Green  To: John Kuhn APRN - CNP  Sent: 12/31/2020 9:55 AM EST  Subject: Non-Urgent Medical Question    Marcio Szymanski had diarrhea and stomach cramps since December 19th. I started immodium a couple of days ago, but it's really not helping. I had a covid test with negative results. Should I continue to ride this out?  Thanks, Mac Carmichael

## 2021-05-25 DIAGNOSIS — I10 ESSENTIAL HYPERTENSION, BENIGN: ICD-10-CM

## 2021-05-25 RX ORDER — ATENOLOL 25 MG/1
TABLET ORAL
Qty: 90 TABLET | Refills: 3 | Status: SHIPPED | OUTPATIENT
Start: 2021-05-25 | End: 2022-03-02 | Stop reason: ALTCHOICE

## 2021-05-25 NOTE — TELEPHONE ENCOUNTER
Last OV: 12/14/2020  Last RX:   Next scheduled apt: 6/14/2021        Sure scripts request      RX pending Subjective:       Patient ID: Kanwal Segundo is a pleasant 61 y.o. Black or  male patient    Chief Complaint: No chief complaint on file.      Patient is a patient I saw last on the 1st of September, see my last notes is of problems below.    HPI     In the interval:  - colonoscopy  - Dr. More, Urology.   He has no concerns to report today.  He is very pleased as he did his colonoscopy showing 3 polyps that were tubular adenoma, repeat in 3 years.  He also had biopsies of the prostate done, path came back negative.  He is to follow-up after 6 months.  No other issue to report.    Patient Active Problem List   Diagnosis    History of HCV, s/p successful treatment w/ [id number] - 10/2015    Hyperlipidemia    Chest pain    Traumatic fracture of ribs with pneumothorax    Pneumothorax    Essential hypertension    Tobacco abuse    Hemothorax on left    Encounter for screening colonoscopy    Periumbilical abdominal pain    PSA elevation    Midline low back pain        ACTIVE MEDICAL ISSUES:  Documented in Problem List     PAST MEDICAL HISTORY  Documented     PAST SURGICAL HISTORY:  Documented     SOCIAL HISTORY:  Documented     FAMILY HISTORY:  Documented     ALLERGIES AND MEDICATIONS: updated and reviewed.  Documented    Review of Systems   Constitutional: Negative for appetite change and unexpected weight change.   HENT: Negative.    Respiratory: Negative.    Cardiovascular: Negative.    Gastrointestinal: Negative for abdominal distention and abdominal pain.   Genitourinary: Negative for frequency.   Musculoskeletal: Negative for back pain.   All other systems reviewed and are negative.      Objective:      Physical Exam  Vitals signs and nursing note reviewed.   Constitutional:       Appearance: He is well-developed.   HENT:      Right Ear: External ear normal.      Left Ear: External ear normal.   Eyes:      Conjunctiva/sclera: Conjunctivae normal.      Pupils: Pupils are equal, round, and  "reactive to light.   Neck:      Musculoskeletal: Normal range of motion.      Thyroid: No thyromegaly.   Cardiovascular:      Rate and Rhythm: Normal rate and regular rhythm.      Heart sounds: Normal heart sounds.   Pulmonary:      Effort: Pulmonary effort is normal.      Breath sounds: Normal breath sounds. No wheezing.   Chest:      Chest wall: No tenderness.   Abdominal:      General: Bowel sounds are normal.      Palpations: Abdomen is soft. There is no mass.      Tenderness: There is no abdominal tenderness.   Genitourinary:     Prostate: Normal.      Rectum: Normal.   Musculoskeletal: Normal range of motion.         General: No tenderness or deformity.   Lymphadenopathy:      Cervical: No cervical adenopathy.   Skin:     General: Skin is warm and dry.   Neurological:      Mental Status: He is alert and oriented to person, place, and time.   Psychiatric:         Behavior: Behavior normal.         Thought Content: Thought content normal.         Judgment: Judgment normal.         Vitals:    11/12/20 1510   BP: 110/60   BP Location: Right arm   Patient Position: Sitting   BP Method: Large (Manual)   Pulse: 60   Resp: 16   Temp: 98.2 °F (36.8 °C)   TempSrc: Oral   SpO2: 97%   Weight: 90.5 kg (199 lb 8.3 oz)   Height: 5' 9" (1.753 m)     Body mass index is 29.46 kg/m².    RESULTS: Reviewed labs from the last 12 months    Assessment:       1. Periumbilical abdominal pain    2. PSA elevation    3. Midline low back pain, unspecified chronicity, unspecified whether sciatica present        Plan:   Diagnoses and all orders for this visit:    Periumbilical abdominal pain    Solved.  Patient had colonoscopy with 3 polyps that were tubular adenomas.  Follow-up in 3 years.    PSA elevation    Had follow-up in Urology, had biopsies that came back benign.  Will follow-up in 6 months with new PSA testing.    Midline low back pain, unspecified chronicity, unspecified whether sciatica present    Solved.    Follow up in about 6 " months (around 5/4/2021) for f-up.    This note was created by combination of typed  and M-Modal dictation.  Transcription errors may be present.  If there are any questions, please contact me.

## 2021-06-03 ENCOUNTER — HOSPITAL ENCOUNTER (OUTPATIENT)
Age: 56
Discharge: HOME | End: 2021-06-03
Payer: COMMERCIAL

## 2021-06-03 DIAGNOSIS — I10 ESSENTIAL HYPERTENSION, BENIGN: ICD-10-CM

## 2021-06-03 DIAGNOSIS — Z13.220 SCREENING CHOLESTEROL LEVEL: ICD-10-CM

## 2021-06-03 DIAGNOSIS — Z13.29 SCREENING FOR THYROID DISORDER: ICD-10-CM

## 2021-06-03 LAB
ALBUMIN SERPL-MCNC: 4.6 G/DL (ref 3.5–5.2)
ALBUMIN/GLOBULIN RATIO: ABNORMAL (ref 1–2.5)
ALP BLD-CCNC: 63 U/L (ref 35–104)
ALT SERPL-CCNC: 15 U/L (ref 5–33)
ANION GAP SERPL CALCULATED.3IONS-SCNC: 9 MMOL/L (ref 9–17)
AST SERPL-CCNC: 20 U/L
BILIRUB SERPL-MCNC: 0.59 MG/DL (ref 0.3–1.2)
BUN BLDV-MCNC: 9 MG/DL (ref 6–20)
BUN/CREAT BLD: 16 (ref 9–20)
CALCIUM SERPL-MCNC: 9.2 MG/DL (ref 8.6–10.4)
CHLORIDE BLD-SCNC: 98 MMOL/L (ref 98–107)
CHOLESTEROL/HDL RATIO: 3.1
CHOLESTEROL: 215 MG/DL
CO2: 29 MMOL/L (ref 20–31)
CREAT SERPL-MCNC: 0.58 MG/DL (ref 0.5–0.9)
GFR AFRICAN AMERICAN: >60 ML/MIN
GFR NON-AFRICAN AMERICAN: >60 ML/MIN
GFR SERPL CREATININE-BSD FRML MDRD: ABNORMAL ML/MIN/{1.73_M2}
GFR SERPL CREATININE-BSD FRML MDRD: ABNORMAL ML/MIN/{1.73_M2}
GLUCOSE BLD-MCNC: 106 MG/DL (ref 70–99)
HDLC SERPL-MCNC: 69 MG/DL
LDL CHOLESTEROL: 126 MG/DL (ref 0–130)
PATIENT FASTING?: YES
POTASSIUM SERPL-SCNC: 4.1 MMOL/L (ref 3.7–5.3)
SODIUM BLD-SCNC: 136 MMOL/L (ref 135–144)
TOTAL PROTEIN: 7.4 G/DL (ref 6.4–8.3)
TRIGL SERPL-MCNC: 100 MG/DL
TSH SERPL DL<=0.05 MIU/L-ACNC: 1.99 MIU/L (ref 0.3–5)
VLDLC SERPL CALC-MCNC: ABNORMAL MG/DL (ref 1–30)

## 2021-06-03 PROCEDURE — 36415 COLL VENOUS BLD VENIPUNCTURE: CPT

## 2021-06-03 PROCEDURE — 84443 ASSAY THYROID STIM HORMONE: CPT

## 2021-06-03 PROCEDURE — 80053 COMPREHEN METABOLIC PANEL: CPT

## 2021-06-03 PROCEDURE — 80061 LIPID PANEL: CPT

## 2021-06-14 ENCOUNTER — OFFICE VISIT (OUTPATIENT)
Dept: FAMILY MEDICINE CLINIC | Age: 56
End: 2021-06-14
Payer: COMMERCIAL

## 2021-06-14 VITALS
WEIGHT: 112 LBS | TEMPERATURE: 97.3 F | HEIGHT: 62 IN | HEART RATE: 85 BPM | BODY MASS INDEX: 20.61 KG/M2 | SYSTOLIC BLOOD PRESSURE: 102 MMHG | DIASTOLIC BLOOD PRESSURE: 76 MMHG | OXYGEN SATURATION: 100 %

## 2021-06-14 DIAGNOSIS — F32.A ANXIETY AND DEPRESSION: ICD-10-CM

## 2021-06-14 DIAGNOSIS — M85.869 OSTEOPENIA OF LOWER LEG, UNSPECIFIED LATERALITY: ICD-10-CM

## 2021-06-14 DIAGNOSIS — F41.9 ANXIETY AND DEPRESSION: ICD-10-CM

## 2021-06-14 DIAGNOSIS — I10 ESSENTIAL HYPERTENSION, BENIGN: ICD-10-CM

## 2021-06-14 DIAGNOSIS — Z00.00 ANNUAL PHYSICAL EXAM: Primary | ICD-10-CM

## 2021-06-14 DIAGNOSIS — E55.9 VITAMIN D DEFICIENCY: ICD-10-CM

## 2021-06-14 PROCEDURE — 99396 PREV VISIT EST AGE 40-64: CPT | Performed by: NURSE PRACTITIONER

## 2021-06-14 RX ORDER — DESVENLAFAXINE 100 MG/1
100 TABLET, EXTENDED RELEASE ORAL DAILY
Qty: 90 TABLET | Refills: 4 | Status: SHIPPED | OUTPATIENT
Start: 2021-06-14 | End: 2021-09-20 | Stop reason: SDUPTHER

## 2021-06-14 RX ORDER — FLUOXETINE 10 MG/1
CAPSULE ORAL
Qty: 42 CAPSULE | Refills: 0 | Status: SHIPPED | OUTPATIENT
Start: 2021-06-14 | End: 2021-07-12 | Stop reason: SDUPTHER

## 2021-06-14 SDOH — ECONOMIC STABILITY: FOOD INSECURITY: WITHIN THE PAST 12 MONTHS, YOU WORRIED THAT YOUR FOOD WOULD RUN OUT BEFORE YOU GOT MONEY TO BUY MORE.: NEVER TRUE

## 2021-06-14 SDOH — ECONOMIC STABILITY: FOOD INSECURITY: WITHIN THE PAST 12 MONTHS, THE FOOD YOU BOUGHT JUST DIDN'T LAST AND YOU DIDN'T HAVE MONEY TO GET MORE.: NEVER TRUE

## 2021-06-14 ASSESSMENT — ENCOUNTER SYMPTOMS
COUGH: 0
SORE THROAT: 0
EYES NEGATIVE: 1
ABDOMINAL DISTENTION: 0
RHINORRHEA: 0
CHEST TIGHTNESS: 0

## 2021-06-14 ASSESSMENT — SOCIAL DETERMINANTS OF HEALTH (SDOH): HOW HARD IS IT FOR YOU TO PAY FOR THE VERY BASICS LIKE FOOD, HOUSING, MEDICAL CARE, AND HEATING?: NOT HARD AT ALL

## 2021-06-14 NOTE — PROGRESS NOTES
2021    Kenneth Lucero (:  1965) is a 60 y.o. female, here for a preventive medicine evaluation. Patient Active Problem List   Diagnosis    Essential hypertension, benign    Classic migraine    Dysthymic disorder       Annual physical   Right handed  Two children adults. 60year old   Non smoker  Alcohol- occassional tequila  Occupation:  with [employer]  Education hs graduation and 1 year [employer]ology school. G[facility]- [facility]. covid19 did not have disease has not chosen to get vaccine. Radon testing done now with ventilation system. Feels like she does not have the motivation to do. Has not worked out  Not trouble falling asleep  Waking at times multiple times during the night. No tearful times. Essential htn well controlled no headaches no chest pain no dizziness. Wt stable but not exercising not motivated to do it. Review of Systems   Constitutional: Negative for activity change, chills and fever. HENT: Negative for congestion, rhinorrhea and sore throat. Eyes: Negative. Respiratory: Negative for cough and chest tightness. Cardiovascular: Negative for chest pain and leg swelling. Gastrointestinal: Negative for abdominal distention. Endocrine: Negative for cold intolerance and heat intolerance. Genitourinary: Negative for difficulty urinating. Musculoskeletal: Negative for arthralgias. Neurological: Negative for dizziness and headaches. Psychiatric/Behavioral: Negative for agitation. The patient is not nervous/anxious. Prior to Visit Medications    Medication Sig Taking?  Authorizing Provider   desvenlafaxine succinate (PRISTIQ) 100 MG TB24 extended release tablet Take 1 tablet by mouth daily Yes WENDY Guzmán CNP   FLUoxetine (PROZAC) 10 MG capsule Take 1 pill by mouth daily x 14 days then increase to 2 pills daily for 20 mg daily dose for depression Yes WENDY Guzmán - KIMBERLY   atenolol (TENORMIN) 25 MG tablet TAKE 1 TABLET DAILY Yes WENDY Fields CNP   raloxifene (EVISTA) 60 MG tablet TAKE 1 TABLET DAILY Yes Gaby Nina MD   hydroCHLOROthiazide (HYDRODIURIL) 25 MG tablet TAKE 1 TABLET DAILY Yes WENDY Fields CNP   ZOLMitriptan (ZOMIG) 5 MG tablet Take 1 tablet by mouth as needed for Migraine Yes WENDY Fields CNP   NONFORMULARY Indications: Calcium takes 1000 a day Yes Historical Provider, MD   Cholecalciferol (VITAMIN D3) 2000 UNITS CAPS Take by mouth Indications: takes 1 a day Yes Historical Provider, MD   NONFORMULARY Indications: Orthomega fish oil 1 capsule Yes Historical Provider, MD        Allergies   Allergen Reactions    Penicillins Swelling     Swelling, rash    Boniva [Ibandronic Acid] Other (See Comments)     Severe joint pain       Past Medical History:   Diagnosis Date    ASCUS of cervix with negative high risk HPV     Encounter for screening colonoscopy 12/3/2013    Endometriosis     Hypertension     Migraine     Osteopenia        Past Surgical History:   Procedure Laterality Date    BACK SURGERY      COLONOSCOPY      in her 25s    COLONOSCOPY N/A 8/27/2019    COLONOSCOPY Funmilayo Stark MD =normal, repeat in 5-10 years (maternal hx polyps)    LAPAROSCOPY         Social History     Socioeconomic History    Marital status:      Spouse name: Not on file    Number of children: Not on file    Years of education: Not on file    Highest education level: Not on file   Occupational History    Not on file   Tobacco Use    Smoking status: Never Smoker    Smokeless tobacco: Never Used   Vaping Use    Vaping Use: Never used   Substance and Sexual Activity    Alcohol use: Yes     Alcohol/week: 3.0 standard drinks     Types: 3 Shots of liquor per week     Comment: occas.     Drug use: No    Sexual activity: Yes     Partners: Male   Other Topics Concern    Not on file   Social History Narrative    Not on file     Social Determinants of Ended) 09/01/2021    Breast cancer screen  01/08/2022    Potassium monitoring  06/03/2022    Creatinine monitoring  06/03/2022    Cervical cancer screen  10/08/2022    Lipid screen  06/03/2026    Colon cancer screen colonoscopy  08/27/2029    Hepatitis A vaccine  Aged Out    Hepatitis B vaccine  Aged Out    Hib vaccine  Aged Out    Meningococcal (ACWY) vaccine  Aged Out    Pneumococcal 0-64 years Vaccine  Aged Out          ASSESSMENT/PLAN:  1. Annual physical exam  2. Anxiety and depression  -     desvenlafaxine succinate (PRISTIQ) 100 MG TB24 extended release tablet; Take 1 tablet by mouth daily, Disp-90 tablet, R-4Normal  3. Essential hypertension, benign  4. Osteopenia of lower leg, unspecified laterality  5. Vitamin D deficiency    Continue pristiq SNRI and add Prozac 10 mg low dose daily to regimen. Pt agreeable to return in 4 weeks  Denies suicidal ideations. Sleeping okay. Return in about 4 weeks (around 7/12/2021) for recheck new med start for depression. An electronic signature was used to authenticate this note.     --WENDY Guzmán - CNP on 6/17/2021 at 8:52 AM

## 2021-06-14 NOTE — PATIENT INSTRUCTIONS
Campos Pozo M.D. - Laney Mackey MD   [facility] [address] [place] [address]   [phone]                                 You may be receiving a survey from [employer] regarding your visit today. You may get this in the mail, through your MyChart or in your email. Please complete the survey to enable us to provide the highest quality of care to you and your family. If you cannot score us as very good ( 5 Stars) on any question, please feel free to call the office to discuss how we could have made your experience exceptional.     Thank You! Fortino Green, APRN-CNP  [address] [facility]  [place], [place]    Phone: [phone]  Fax: [address] [facility] Office Hours:  Monday: [facility] office location 8-5 ([phone]) Offering additional late hours the first Monday of the month until 7 pm.   Tuesday: 8-5 Wednesday: 8-5 Thursday:  Additional hours offered 2 Thursdays a month. Please call to inquire those dates. Fridays: 7:30-4:30        Tetanus , diptheria, and pertussis (Tdap ) vaccine needs updated at local pharmacy. Patient Education        fluoxetine  Pronunciation:  floo OX e teen  Brand:  PROzac, Sarafem  What is the most important information I should know about fluoxetine? You should not use fluoxetine if you also take pimozide or thioridazine. Do not use this medicine if you have used an MAO inhibitor in the past 14 days, such as isocarboxazid, linezolid, methylene blue injection, phenelzine, rasagiline, selegiline, or tranylcypromine. Wait at least 14 days after stopping an MAO inhibitor before you take fluoxetine. Wait 5 weeks after stopping fluoxetine before you take thioridazine or an MAOI. Some young people have thoughts about suicide when first taking an antidepressant. Stay alert to changes in your mood or symptoms. Report any new or worsening symptoms to your doctor. What is fluoxetine?   Fluoxetine is a selective serotonin reuptake inhibitors (SSRI) antidepressant. Fluoxetine is used to treat major depressive disorder, bulimia nervosa (an eating disorder) obsessive-compulsive disorder, panic disorder, and premenstrual dysphoric disorder (PMDD). Fluoxetine is sometimes used together with olanzapine (Zyprexa) to treat manic depression caused by bipolar disorder. This combination is also used to treat depression after at least 2 other medications have failed. If you also take olanzapine (Zyprexa), read the Zyprexa medication guide and all patient warnings and instructions provided with that medication. Fluoxetine may also be used for purposes not listed in this medication guide. What should I discuss with my healthcare provider before taking fluoxetine? You should not use fluoxetine if you are allergic to it, if you also take pimozide or thioridazine. Do not use fluoxetine if you have used an MAO inhibitor in the past 14 days. A dangerous drug interaction could occur. MAO inhibitors include isocarboxazid, linezolid, methylene blue injection, phenelzine, rasagiline, selegiline, and tranylcypromine. You must wait at least 14 days after stopping an MAO inhibitor before you take fluoxetine. You must wait 5 weeks after stopping fluoxetine before you can take thioridazine or an MAOI. Tell your doctor about all other antidepressants you take, especially Celexa, Cymbalta, Desyrel, Effexor, Lexapro, Luvox, Oleptro, Paxil, Pexeva, Symbyax, Viibryd, or Zoloft. Tell your doctor if you have ever had:  · cirrhosis of the liver;  · urination problems;  · diabetes;  · narrow-angle glaucoma;  · seizures or epilepsy;  · bipolar disorder (manic depression);  · drug abuse or suicidal thoughts; or  · electroconvulsive therapy (ECT). Some young people have thoughts about suicide when first taking an antidepressant. Your doctor should check your progress at regular visits. Your family or other caregivers should also be alert to changes in your mood or symptoms.   Older adults may be more sensitive to the effects of this medicine. Ask your doctor about taking this medicine if you are pregnant. Taking an SSRI antidepressant during late pregnancy may cause serious medical complications in the baby. However, you may have a relapse of depression if you stop taking your antidepressant. Tell your doctor right away if you become pregnant. If you are pregnant, your name may be listed on a pregnancy registry to track the effects of fluoxetine on the baby. If you are breastfeeding, tell your doctor if you notice agitation, fussiness, feeding problems, or poor weight gain in the nursing baby. Fluoxetine is not approved for use by anyone younger than 25years old. How should I take fluoxetine? Follow all directions on your prescription label and read all medication guides or instruction sheets. Your doctor may occasionally change your dose. Use the medicine exactly as directed. Swallow the delayed-release capsule whole and do not crush, chew, break, or open it. Measure liquid medicine carefully. Use the dosing syringe provided, or use a medicine dose-measuring device (not a kitchen spoon). It may take up to 4 weeks before your symptoms improve. Keep using the medication as directed and tell your doctor if your symptoms do not improve. Do not stop using fluoxetine suddenly, or you could have unpleasant withdrawal symptoms. Ask your doctor how to safely stop using fluoxetine. Store at room temperature away from moisture and heat. What happens if I miss a dose? Take the medicine as soon as you can, but skip the missed dose if it is almost time for your next dose. Do not take two doses at one time. If you miss a dose of Prozac Weekly, take the missed dose as soon as you remember and take the next dose 7 days later. However, if it is almost time for the next regularly scheduled weekly dose, skip the missed dose and take the next one as directed.  Do not take extra medicine to make up the feeling anxious or nervous;  · pain, weakness, yawning, tired feeling;  · upset stomach, loss of appetite, nausea, vomiting, diarrhea;  · dry mouth, sweating, hot flashes;  · changes in weight or appetite;  · stuffy nose, sinus pain, sore throat, flu symptoms; or  · decreased sex drive, impotence, or difficulty having an orgasm. This is not a complete list of side effects and others may occur. Call your doctor for medical advice about side effects. You may report side effects to FDA at 5-343-FDA-3586. What other drugs will affect fluoxetine? Fluoxetine can cause a serious heart problem. Your risk may be higher if you also use certain other medicines for infections, asthma, heart problems, high blood pressure, depression, mental illness, cancer, malaria, or HIV. Using fluoxetine with other drugs that make you drowsy can worsen this effect. Ask your doctor before using opioid medication, a sleeping pill, a muscle relaxer, or medicine for anxiety or seizures. Ask your doctor before taking a nonsteroidal anti-inflammatory drug (NSAID) such as aspirin, ibuprofen (Advil, Motrin), naproxen (Aleve), celecoxib (Celebrex), diclofenac, indomethacin, meloxicam, and others. Using an NSAID with fluoxetine may cause you to bruise or bleed easily. Tell your doctor about all your current medicines. Many drugs can affect fluoxetine, especially:  · any other antidepressant;  · [place] W[place];  · tryptophan (sometimes called L-tryptophan);  · a blood thinner --warfarin, Coumadin, Jantoven;  · medicine to treat anxiety, mood disorders, thought disorders, or mental illness --amitriptyline, buspirone, desipramine, lithium, nortriptyline, and many others;  · medicine to treat ADHD or narcolepsy --Adderall, Concerta, Ritalin, Vyvanse, Zenzedi, and others;  · migraine headache medicine --rizatriptan, sumatriptan, zolmitriptan, and others; or  · narcotic pain medicine --fentanyl, tramadol.   This list is not complete and many other drugs may affect fluoxetine. This includes prescription and over-the-counter medicines, vitamins, and herbal products. Not all possible drug interactions are listed here. Where can I get more information? Your pharmacist can provide more information about fluoxetine. Remember, keep this and all other medicines out of the reach of children, never share your medicines with others, and use this medication only for the indication prescribed. Every effort has been made to ensure that the information provided by [address] [place] [address]  is accurate, up-to-date, and complete, but no guarantee is made to that effect. Drug information contained herein may be time sensitive. [facility] information has been compiled for use by healthcare practitioners and consumers in the [place] and therefore [facility] does not warrant that uses outside of the [place] are appropriate, unless specifically indicated otherwise. [facility]'s drug information does not endorse drugs, diagnose patients or recommend therapy. [facility] drug information is an informational resource designed to assist licensed healthcare practitioners in caring for their patients and/or to serve consumers viewing this service as a supplement to, and not a substitute for, the expertise, skill, knowledge and judgment of healthcare practitioners. The absence of a warning for a given drug or drug combination in no way should be construed to indicate that the drug or drug combination is safe, effective or appropriate for any given patient. [facility] does not assume any responsibility for any aspect of healthcare administered with the aid of information [facility] provides. The information contained herein is not intended to cover all possible uses, directions, precautions, warnings, drug interactions, allergic reactions, or adverse effects.  If you have questions about the drugs you are taking, check with your doctor, nurse or pharmacist.  Copyright 9557-2674 [employer] Version: 25.02. Revision date: 8/3/2020. Care instructions adapted under license by [facility] ([facility]). If you have questions about a medical condition or this instruction, always ask your healthcare professional. [address] any warranty or liability for your use of this information.

## 2021-07-12 RX ORDER — FLUOXETINE HYDROCHLORIDE 20 MG/1
20 CAPSULE ORAL DAILY
Qty: 30 CAPSULE | Refills: 1 | Status: SHIPPED | OUTPATIENT
Start: 2021-07-12 | End: 2021-08-30 | Stop reason: ALTCHOICE

## 2021-08-30 ENCOUNTER — OFFICE VISIT (OUTPATIENT)
Dept: FAMILY MEDICINE CLINIC | Age: 56
End: 2021-08-30
Payer: COMMERCIAL

## 2021-08-30 VITALS
HEART RATE: 65 BPM | SYSTOLIC BLOOD PRESSURE: 102 MMHG | WEIGHT: 110 LBS | BODY MASS INDEX: 20.12 KG/M2 | OXYGEN SATURATION: 98 % | DIASTOLIC BLOOD PRESSURE: 70 MMHG

## 2021-08-30 DIAGNOSIS — Z71.85 VACCINE COUNSELING: ICD-10-CM

## 2021-08-30 DIAGNOSIS — F33.1 MODERATE EPISODE OF RECURRENT MAJOR DEPRESSIVE DISORDER (HCC): Primary | ICD-10-CM

## 2021-08-30 DIAGNOSIS — H40.9 GLAUCOMA OF BOTH EYES, UNSPECIFIED GLAUCOMA TYPE: ICD-10-CM

## 2021-08-30 PROCEDURE — 99213 OFFICE O/P EST LOW 20 MIN: CPT | Performed by: NURSE PRACTITIONER

## 2021-08-30 PROCEDURE — 3017F COLORECTAL CA SCREEN DOC REV: CPT | Performed by: NURSE PRACTITIONER

## 2021-08-30 PROCEDURE — 1036F TOBACCO NON-USER: CPT | Performed by: NURSE PRACTITIONER

## 2021-08-30 PROCEDURE — G8420 CALC BMI NORM PARAMETERS: HCPCS | Performed by: NURSE PRACTITIONER

## 2021-08-30 PROCEDURE — G8427 DOCREV CUR MEDS BY ELIG CLIN: HCPCS | Performed by: NURSE PRACTITIONER

## 2021-08-30 NOTE — PATIENT INSTRUCTIONS
You may be receiving a survey from [employer] regarding your visit today. You may get this in the mail, through your MyChart or in your email. Please complete the survey to enable us to provide the highest quality of care to you and your family. If you cannot score us as very good ( 5 Stars) on any question, please feel free to call the office to discuss how we could have made your experience exceptional.     Thank You! Jeanne StallingsfosterWENDY-CNP  [address] [place], [address]    Phone: [phone]  Fax: [facility] Office Hours:  Monday: [facility] office location 8-5 ([phone]) Offering additional late hours the first Monday of the month until 7 pm.   Tuesday: 8-5 Wednesday: 8-5 Thursday:  Additional hours offered 2 Thursdays a month. Please call to inquire those dates. Fridays: 7:30-4:30        Patient Education        vilazodone  Pronunciation:  Brina Maldonado oh done  Brand:  Zachary Hendrix  What is the most important information I should know about vilazodone? Do not use this medicine if you have used an MAO inhibitor in the past 14 days, such as isocarboxazid, linezolid, methylene blue injection, phenelzine, rasagiline, selegiline, or tranylcypromine. Some young people have thoughts about suicide when first taking an antidepressant. Stay alert to changes in your mood or symptoms. Report any new or worsening symptoms to your doctor. Vilazodone is not approved for use by anyone younger than 25years old. What is vilazodone? Vilazodone is an antidepressant that is used to treat major depressive disorder (MDD). Vilazodone may also be used for purposes not listed in this medication guide. What should I discuss with my healthcare provider before taking vilazodone? You should not use vilazodone if you are being treated with methylene blue injection. Do not use vilazodone if you have used an MAO inhibitor in the past 14 days.  A dangerous drug interaction could occur. MAO inhibitors include isocarboxazid, linezolid, methylene blue injection, phenelzine, rasagiline, selegiline, tranylcypromine, and others. After you stop taking vilazodone, you must wait at least 14 days before you start taking an MAOI. Tell your doctor if you have ever had:  · liver or kidney disease;  · a bleeding or blood clotting disorder;  · narrow-angle glaucoma;  · seizures or epilepsy;  · bipolar disorder (manic depression);  · drug addiction or suicidal thoughts; or  · if you drink alcohol. Be sure your doctor knows if you also take stimulant medicine, opioid medicine, herbal products, or medicine for depression, mental illness, Parkinson's disease, migraine headaches, serious infections, or prevention of nausea and vomiting. These medicines may interact with vilazodone and cause a serious condition called serotonin syndrome. Some young people have thoughts about suicide when first taking an antidepressant. Your doctor should check your progress at regular visits. Your family or other caregivers should also be alert to changes in your mood or symptoms. Ask your doctor about taking this medicine if you are pregnant. Taking an SSRI antidepressant during late pregnancy may cause serious medical complications in the baby. However, you may have a relapse of depression if you stop taking your antidepressant. Tell your doctor right away if you become pregnant. Do not start or stop taking this medicine without your doctor's advice. It may not be safe to breastfeed while using this medicine. Ask your doctor about any risk. Vilazodone is not approved for use by anyone younger than 25years old. How should I take vilazodone? Follow all directions on your prescription label and read all medication guides or instruction sheets. Your doctor may occasionally change your dose. Use the medicine exactly as directed. Vilazodone works best if you take it with food.   It may take several weeks or months before your symptoms improve. Keep using the medication as directed and tell your doctor if your symptoms do not improve, or if they get worse. Do not stop using vilazodone suddenly, or you could have unpleasant symptoms (such as dizziness, vomiting, agitation, sweating, confusion, numbness, tingling, or electric shock feelings). Ask your doctor how to safely stop using this medicine. Store at room temperature away from moisture and heat. What happens if I miss a dose? Take the medicine as soon as you can, but skip the missed dose if it is almost time for your next dose. Do not take two doses at one time. What happens if I overdose? Seek emergency medical attention or call the Poison Help line at 1-285.620.1999. An overdose of vilazodone can be fatal.  What should I avoid while taking vilazodone? Drinking alcohol with this medicine can cause side effects. Avoid driving or hazardous activity until you know how this medicine will affect you. Your reactions could be impaired. What are the possible side effects of vilazodone? Get emergency medical help if you have signs of an allergic reaction: skin rash or hives; difficulty breathing; swelling of your face, lips, tongue, or throat. Report any new or worsening symptoms to your doctor, such as: mood or behavior changes, anxiety, panic attacks, trouble sleeping, or if you feel impulsive, irritable, agitated, hostile, aggressive, restless, hyperactive (mentally or physically), more depressed, or have thoughts about suicide or hurting yourself.   Call your doctor at once if you have:  · a seizure (convulsions);  · blurred vision, tunnel vision, eye pain or swelling, or seeing halos around lights;  · easy bruising, unusual bleeding;  · racing thoughts, unusual risk-taking behavior, decreased inhibitions, feelings of extreme happiness or sadness; or  · low levels of sodium in the body --headache, confusion, slurred speech, severe weakness, loss of coordination, feeling unsteady. Seek medical attention right away if you have symptoms of serotonin syndrome, such as: agitation, hallucinations, fever, sweating, shivering, fast heart rate, muscle stiffness, twitching, loss of coordination, nausea, vomiting, or diarrhea. Common side effects may include:  · nausea, vomiting;  · diarrhea; or  · sleep problems (insomnia). This is not a complete list of side effects and others may occur. Call your doctor for medical advice about side effects. You may report side effects to FDA at 4-977-FDA-5353. What other drugs will affect vilazodone? Ask your doctor before taking a nonsteroidal anti-inflammatory drug (NSAID) such as aspirin, ibuprofen (Advil, Motrin), naproxen (Aleve), celecoxib (Celebrex), diclofenac, indomethacin, meloxicam, and others. Using an NSAID with vilazodone may cause you to bruise or bleed easily. Using vilazodone with other drugs that make you drowsy can worsen this effect. Ask your doctor before using opioid medication, a sleeping pill, a muscle relaxer, or medicine for anxiety or seizures. Tell your doctor about all your current medicines. Many drugs can affect vilazodone, especially:  · any other antidepressants;  · mephenytoin;  · Yony's wort;  · tramadol;  · a diuretic or \"water pill\";  · medicine to treat anxiety, mood disorders, or mental illness such as schizophrenia;  · a blood thinner --warfarin, Coumadin, Jantoven; or  · migraine headache medicine --sumatriptan, Imitrex, Maxalt, Treximet, and others. This list is not complete and many other drugs may affect vilazodone. This includes prescription and over-the-counter medicines, vitamins, and herbal products. Not all possible drug interactions are listed here. Where can I get more information? Your pharmacist can provide more information about vilazodone.   Remember, keep this and all other medicines out of the reach of children, never share your medicines with others, and use this medication only for the indication prescribed. Every effort has been made to ensure that the information provided by [address] is accurate, up-to-date, and complete, but no guarantee is made to that effect. Drug information contained herein may be time sensitive. [facility] information has been compiled for use by healthcare practitioners and consumers in the [place] and therefore [facility] does not warrant that uses outside of the [place] are appropriate, unless specifically indicated otherwise. [facility]'s drug information does not endorse drugs, diagnose patients or recommend therapy. [facility]'s drug information is an informational resource designed to assist licensed healthcare practitioners in caring for their patients and/or to serve consumers viewing this service as a supplement to, and not a substitute for, the expertise, skill, knowledge and judgment of healthcare practitioners. The absence of a warning for a given drug or drug combination in no way should be construed to indicate that the drug or drug combination is safe, effective or appropriate for any given patient. [facility] does not assume any responsibility for any aspect of healthcare administered with the aid of information [facility] provides. The information contained herein is not intended to cover all possible uses, directions, precautions, warnings, drug interactions, allergic reactions, or adverse effects. If you have questions about the drugs you are taking, check with your doctor, nurse or pharmacist.  Copyright 8065-6188 [address] [place]. Version: 6.02. Revision date: 4/22/2020. Care instructions adapted under license by [facility] ([facility]). If you have questions about a medical condition or this instruction, always ask your healthcare professional. [place] [address] any warranty or liability for your use of this information.

## 2021-08-30 NOTE — PROGRESS NOTES
[address] [address]  Dept: [phone]  Dept Fax: [phone]    Last encounter 6/14/2021    Discuss Medications (Here for follow up medication follow up,  Tried Prozac which did help with the anxiety and depression aspect, but gave her bad insomnia. . pt has stopped taking the medicaiton.)       HPI:   Jazlyn Ponce is a 60 y.o. female who presentstoday for her medical conditions/complaints as noted below. Jazlyn Ponce is c/o of Discuss Medications (Here for follow up medication follow up,  Tried Prozac which did help with the anxiety and depression aspect, but gave her bad insomnia. . pt has stopped taking the medicaiton.)      HPI  Established patient      Follow up depression was on prozac. Which worked well but caused insomnia with very difficulty sleeping at all. Stopped medication cold turkey about a week ago. Pt remains on pristiq. Pt with prior use celexa and lexapro which did not help. Hx zoloft use years ago, discussed zoloft has 30% risk insomnia too. paxil cannot be used due to new onset glaucoma. Some risks with pristiq due to slight dilation of eye. Pt aware to monitor   bp controlled    Vaccine discussion of covid 19 disease pt has not had illness and has not chosen to get vaccine encouraged to get soon, with uptick of cases in [place] and delta variant risk. Reviewed vaccine. covid 19 disease. tdap also due.    Reviewed prior notes None  Reviewed previous Labs, Imaging and Hospital Records    Past Medical History:   Diagnosis Date    ASCUS of cervix with negative high risk HPV     Encounter for screening colonoscopy 12/3/2013    Endometriosis     Hypertension     Migraine     Osteopenia       Past Surgical History:   Procedure Laterality Date    BACK SURGERY      COLONOSCOPY      in her 25s    COLONOSCOPY N/A 8/27/2019    COLONOSCOPY Kayla Harmon MD =normal, repeat in 5-10 years (maternal hx polyps)    LAPAROSCOPY         Family History   Problem Relation Age of Onset    Cancer Father         Non-Hodgkins Lymphoma    Diabetes Mother     Hypertension Mother     Cancer Mother         Liver Cancer    Diabetes Brother     No Known Problems Sister        Social History     Tobacco Use    Smoking status: Never Smoker    Smokeless tobacco: Never Used   Substance Use Topics    Alcohol use: Yes     Alcohol/week: 3.0 standard drinks     Types: 3 Shots of liquor per week     Comment: occas. Current Outpatient Medications   Medication Sig Dispense Refill    Vilazodone HCl 10 & 20 MG KIT Take 10 mg by mouth daily for 7 days, THEN 20 mg daily for 23 days. For depression. 14 kit 0    desvenlafaxine succinate (PRISTIQ) 100 MG TB24 extended release tablet Take 1 tablet by mouth daily 90 tablet 4    atenolol (TENORMIN) 25 MG tablet TAKE 1 TABLET DAILY 90 tablet 3    raloxifene (EVISTA) 60 MG tablet TAKE 1 TABLET DAILY 90 tablet 3    hydroCHLOROthiazide (HYDRODIURIL) 25 MG tablet TAKE 1 TABLET DAILY 90 tablet 3    ZOLMitriptan (ZOMIG) 5 MG tablet Take 1 tablet by mouth as needed for Migraine 30 tablet 1    NONFORMULARY Indications: Calcium takes 1000 a day      Cholecalciferol (VITAMIN D3) 2000 UNITS CAPS Take by mouth Indications: takes 1 a day      NONFORMULARY Indications: Orthomega fish oil 1 capsule       No current facility-administered medications for this visit.      Allergies   Allergen Reactions    Penicillins Swelling     Swelling, rash    Boniva [Ibandronic Acid] Other (See Comments)     Severe joint pain       Health Maintenance   Topic Date Due    Hepatitis C screen  Never done    COVID-19 Vaccine (1) Never done    HIV screen  Never done    DTaP/Tdap/Td vaccine (1 - Tdap) Never done    Shingles Vaccine (1 of 2) Never done    Flu vaccine (1) 09/01/2021    Breast cancer screen  01/08/2022    Potassium monitoring  06/03/2022    Creatinine monitoring  06/03/2022    Cervical cancer screen  10/08/2022    Lipid screen  06/03/2026    Colon cancer screen colonoscopy  08/27/2029    Hepatitis A vaccine  Aged Out    Hepatitis B vaccine  Aged Out    Hib vaccine  Aged Out    Meningococcal (ACWY) vaccine  Aged Out    Pneumococcal 0-64 years Vaccine  Aged Out       Subjective:      Review of Systems   Constitutional: Negative for activity change, chills and fatigue. HENT: Negative for congestion and rhinorrhea. Eyes: Negative. Respiratory: Negative for cough and shortness of breath. Cardiovascular: Negative for chest pain and leg swelling. Gastrointestinal: Negative for abdominal distention. Endocrine: Negative for cold intolerance and heat intolerance. Genitourinary: Negative for difficulty urinating. Musculoskeletal: Negative for arthralgias. Skin: Negative for rash. Neurological: Negative for dizziness and headaches. Psychiatric/Behavioral: Positive for decreased concentration and sleep disturbance. Negative for agitation, dysphoric mood and self-injury. The patient is nervous/anxious. Objective:     Physical Exam  Vitals and nursing note reviewed. Constitutional:       General: She is not in acute distress. Appearance: Normal appearance. She is well-developed. HENT:      Head: Normocephalic and atraumatic. Right Ear: Tympanic membrane and external ear normal.      Left Ear: Tympanic membrane and external ear normal.      Nose: No congestion. Mouth/Throat:      Mouth: Mucous membranes are moist.   Eyes:      General: No scleral icterus. Pupils: Pupils are equal, round, and reactive to light. Cardiovascular:      Rate and Rhythm: Normal rate and regular rhythm. Heart sounds: Normal heart sounds. No murmur heard. Pulmonary:      Effort: Pulmonary effort is normal. No respiratory distress. Breath sounds: Normal breath sounds. No wheezing. Abdominal:      Palpations: Abdomen is soft. Tenderness:  There is no abdominal tenderness. Musculoskeletal:         General: Normal range of motion. Cervical back: Normal range of motion and neck supple. Lymphadenopathy:      Cervical: No cervical adenopathy. Skin:     General: Skin is warm and dry. Neurological:      Mental Status: She is alert and oriented to person, place, and time. Psychiatric:         Behavior: Behavior normal.         Thought Content: Thought content normal.         Judgment: Judgment normal.      Comments: Good eye contact. /70 (Site: Left Upper Arm, Position: Sitting, Cuff Size: Medium Adult)   Pulse 65   Wt 110 lb (49.9 kg)   LMP 09/29/2015 (Approximate)   SpO2 98%   BMI 20.12 kg/m²     Data:     Lab Results   Component Value Date     06/03/2021    K 4.1 06/03/2021    CL 98 06/03/2021    CO2 29 06/03/2021    BUN 9 06/03/2021    CREATININE 0.58 06/03/2021    GLUCOSE 106 06/03/2021    PROT 7.4 06/03/2021    LABALBU 4.6 06/03/2021    BILITOT 0.59 06/03/2021    ALKPHOS 63 06/03/2021    AST 20 06/03/2021    ALT 15 06/03/2021     Lab Results   Component Value Date    WBC 6.4 12/02/2020    RBC 3.72 12/02/2020    HGB 12.1 12/02/2020    HCT 35.5 12/02/2020    MCV 95.4 12/02/2020    MCH 32.6 12/02/2020    MCHC 34.2 12/02/2020    RDW 12.8 12/02/2020     12/02/2020    MPV NOT REPORTED 12/02/2020     Lab Results   Component Value Date    TSH 1.99 06/03/2021     Lab Results   Component Value Date    CHOL 215 06/03/2021    CHOL 226 08/19/2019    HDL 69 06/03/2021    LABA1C 5.1 06/20/2020          Assessment & Plan       1. Moderate episode of recurrent major depressive disorder ([facility] [address]  Start viibryd in addition to pristiq  Follow up in 4 weeks in office    2. Vaccine counseling  Discussed and encouraged both Tdap and covid 19 vaccines. 3. Glaucoma of both eyes, unspecified glaucoma type  Close monitoring  Risks associated with  SSRI discussed.                        Completed Refills   Requested Prescriptions     Signed Prescriptions Disp Refills    Vilazodone HCl 10 & 20 MG KIT 14 kit 0     Sig: Take 10 mg by mouth daily for 7 days, THEN 20 mg daily for 23 days. For depression. No follow-ups on file. Orders Placed This Encounter   Medications    Vilazodone HCl 10 & 20 MG KIT     Sig: Take 10 mg by mouth daily for 7 days, THEN 20 mg daily for 23 days. For depression. Dispense:  14 kit     Refill:  0     No orders of the defined types were placed in this encounter. Baljit Beasley received counseling on the following healthy behaviors: nutrition, exercise and medication adherence  Reviewed prior labs and health maintenance. Continue current medications, diet and exercise. Discussed use, benefit, and side effects of prescribed medications. Barriers to medication compliance addressed. Patient given educational materials - see patient instructions. All patient questions answered. Patient voiced understanding. On this date 8/30/2021 I have spent 26 minutes reviewing previous notes, test results and face to face with the patient discussing the diagnosis and importance of compliance with the treatment plan as well as documenting on the day of the visit.      Electronically signed by WENDY Mckee CNP on 8/31/2021 at 8:12 PM

## 2021-08-31 ASSESSMENT — ENCOUNTER SYMPTOMS
RHINORRHEA: 0
EYES NEGATIVE: 1
SHORTNESS OF BREATH: 0
ABDOMINAL DISTENTION: 0
COUGH: 0

## 2021-09-03 NOTE — TELEPHONE ENCOUNTER
----- Message from [employer], [place] sent at 9/3/2021  9:13 AM EDT -----  Subject: Medication Problem    QUESTIONS  Name of Medication? Vilazodone HCl 10 & 20 MG KIT  Patient-reported dosage and instructions? N/A patient still unable to fill   to start   What question or problem do you have with the medication? [employer]   requires prior authorization to fill this medication for patient. Patient   states she was to begin last Tuesday and pharmacy sent form to provider. Preferred Pharmacy? [facility] phone number (if available)? [phone]  Additional Information for Provider? Please confirm with patient today as   how to proceed over holiday weekend.  ---------------------------------------------------------------------------  --------------  CALL BACK INFO  What is the best way for the office to contact you? OK to leave message on   voicemail  Preferred Call Back Phone Number? [phone]  ---------------------------------------------------------------------------  --------------  SCRIPT ANSWERS  Relationship to Patient?  Self

## 2021-09-08 ENCOUNTER — PATIENT MESSAGE (OUTPATIENT)
Dept: FAMILY MEDICINE CLINIC | Age: 56
End: 2021-09-08

## 2021-09-08 DIAGNOSIS — F32.A ANXIETY AND DEPRESSION: ICD-10-CM

## 2021-09-08 DIAGNOSIS — F41.9 ANXIETY AND DEPRESSION: ICD-10-CM

## 2021-09-20 RX ORDER — DESVENLAFAXINE 50 MG/1
50 TABLET, EXTENDED RELEASE ORAL DAILY
Qty: 30 TABLET | Refills: 1 | Status: SHIPPED | OUTPATIENT
Start: 2021-09-20 | End: 2021-09-27 | Stop reason: SDUPTHER

## 2021-09-27 ENCOUNTER — OFFICE VISIT (OUTPATIENT)
Dept: FAMILY MEDICINE CLINIC | Age: 56
End: 2021-09-27
Payer: COMMERCIAL

## 2021-09-27 VITALS
SYSTOLIC BLOOD PRESSURE: 116 MMHG | WEIGHT: 114 LBS | DIASTOLIC BLOOD PRESSURE: 66 MMHG | BODY MASS INDEX: 20.85 KG/M2 | HEART RATE: 52 BPM | OXYGEN SATURATION: 98 %

## 2021-09-27 DIAGNOSIS — F32.A ANXIETY AND DEPRESSION: ICD-10-CM

## 2021-09-27 DIAGNOSIS — F41.9 ANXIETY AND DEPRESSION: ICD-10-CM

## 2021-09-27 PROCEDURE — 1036F TOBACCO NON-USER: CPT | Performed by: NURSE PRACTITIONER

## 2021-09-27 PROCEDURE — 99212 OFFICE O/P EST SF 10 MIN: CPT | Performed by: NURSE PRACTITIONER

## 2021-09-27 PROCEDURE — 3017F COLORECTAL CA SCREEN DOC REV: CPT | Performed by: NURSE PRACTITIONER

## 2021-09-27 PROCEDURE — G8427 DOCREV CUR MEDS BY ELIG CLIN: HCPCS | Performed by: NURSE PRACTITIONER

## 2021-09-27 PROCEDURE — G8420 CALC BMI NORM PARAMETERS: HCPCS | Performed by: NURSE PRACTITIONER

## 2021-09-27 RX ORDER — DESVENLAFAXINE 50 MG/1
50 TABLET, EXTENDED RELEASE ORAL DAILY
Qty: 30 TABLET | Refills: 0 | Status: SHIPPED | OUTPATIENT
Start: 2021-09-27 | End: 2021-12-27 | Stop reason: SDUPTHER

## 2021-09-27 RX ORDER — VILAZODONE HYDROCHLORIDE 20 MG/1
20 TABLET ORAL DAILY
Qty: 90 TABLET | Refills: 0 | Status: SHIPPED | OUTPATIENT
Start: 2021-09-27 | End: 2021-09-27 | Stop reason: SDUPTHER

## 2021-09-27 RX ORDER — VILAZODONE HYDROCHLORIDE 20 MG/1
20 TABLET ORAL DAILY
Qty: 30 TABLET | Refills: 0 | Status: SHIPPED | OUTPATIENT
Start: 2021-09-27 | End: 2021-12-27 | Stop reason: SDUPTHER

## 2021-09-27 RX ORDER — DESVENLAFAXINE 50 MG/1
50 TABLET, EXTENDED RELEASE ORAL DAILY
Qty: 90 TABLET | Refills: 0 | Status: SHIPPED | OUTPATIENT
Start: 2021-09-27 | End: 2021-09-27 | Stop reason: SDUPTHER

## 2021-09-27 ASSESSMENT — ENCOUNTER SYMPTOMS
ABDOMINAL DISTENTION: 0
SHORTNESS OF BREATH: 0
CHEST TIGHTNESS: 0
CONSTIPATION: 0
COUGH: 0
EYES NEGATIVE: 1

## 2021-09-28 NOTE — PROGRESS NOTES
HPI Notes    Name: Naveen Sanchez  : 1965         Chief Complaint:     Chief Complaint   Patient presents with    1 Month Follow-Up     Pt here for 1 month check up on new medication. had to change medication due to insomnia. Pt states that since starting new medication has been having more confusion, is a little better since starting the lower dose. History of Present Illness:        HPI    Follow up depression 1 month with med changes started viibryd doing well through 1st month ramp up and when going to 20 mg daily dose pt started with some loss of thought mid sentence, forgetfulness, word finding. Felt this increased potency of pristiq so lowered pristiq dose by 1/2 since this behavior more characteristic of pristiq . Pt offers much improved since transitioning dose and doing well. Depression much improved more motivated to get out of bed. Not doing counseling. Work going better. Would like to continue current meds. No thoughts of self harm. Sleeping better. \"not perfect but better\"     Past Medical History:     Past Medical History:   Diagnosis Date    ASCUS of cervix with negative high risk HPV     Encounter for screening colonoscopy 12/3/2013    Endometriosis     Hypertension     Migraine     Osteopenia       Reviewed all health maintenance requirements and ordered appropriate tests  Health Maintenance Due   Topic Date Due    Hepatitis C screen  Never done    HIV screen  Never done    DTaP/Tdap/Td vaccine (1 - Tdap) Never done    Shingles Vaccine (1 of 2) Never done    Flu vaccine (1) Never done       Past Surgical History:     Past Surgical History:   Procedure Laterality Date    BACK SURGERY      COLONOSCOPY      in her 25s    COLONOSCOPY N/A 2019    COLONOSCOPY Kaden Lara MD =normal, repeat in 5-10 years (maternal hx polyps)    LAPAROSCOPY          Medications:       Prior to Admission medications    Medication Sig Start Date End Date Taking? Authorizing Provider   vilazodone HCl (VIIBRYD) 20 MG TABS Take 1 tablet by mouth daily 9/27/21  Yes WENDY Flores CNP   desvenlafaxine succinate (PRISTIQ) 50 MG TB24 extended release tablet Take 1 tablet by mouth daily Depression /anxiety 9/27/21  Yes WENDY Flores CNP   atenolol (TENORMIN) 25 MG tablet TAKE 1 TABLET DAILY 5/25/21  Yes WENDY Flores CNP   raloxifene (EVISTA) 60 MG tablet TAKE 1 TABLET DAILY 12/30/20  Yes Fred Chamberlain MD   hydroCHLOROthiazide (HYDRODIURIL) 25 MG tablet TAKE 1 TABLET DAILY 12/19/20  Yes WENDY Flores CNP   ZOLMitriptan (ZOMIG) 5 MG tablet Take 1 tablet by mouth as needed for Migraine 5/6/20  Yes WENDY Flores CNP   NONFORMULARY Indications: Calcium takes 1000 a day   Yes Historical Provider, MD   Cholecalciferol (VITAMIN D3) 2000 UNITS CAPS Take by mouth Indications: takes 1 a day   Yes Historical Provider, MD   NONFORMULARY Indications: Orthomega fish oil 1 capsule   Yes Historical Provider, MD        Allergies:       Penicillins and Boniva [ibandronic acid]    Social History:     Tobacco:    reports that she has never smoked. She has never used smokeless tobacco.  Alcohol:      reports current alcohol use of about 3.0 standard drinks of alcohol per week. Drug Use:  reports no history of drug use. Family History:     Family History   Problem Relation Age of Onset    Cancer Father         Non-Hodgkins Lymphoma    Diabetes Mother     Hypertension Mother     Cancer Mother         Liver Cancer    Diabetes Brother     No Known Problems Sister        Review of Systems:         Review of Systems   Constitutional: Negative for activity change, appetite change, chills and fever. HENT: Negative for congestion and postnasal drip. Eyes: Negative. Respiratory: Negative for cough, chest tightness and shortness of breath. Cardiovascular: Negative for chest pain. Gastrointestinal: Negative for abdominal distention and constipation. Genitourinary: Negative for difficulty urinating and frequency. Musculoskeletal: Negative for arthralgias. Skin: Negative for rash. Physical Exam:     Vitals:  /66 (Site: Left Upper Arm, Position: Sitting, Cuff Size: Medium Adult)   Pulse 52   Wt 114 lb (51.7 kg)   LMP 09/29/2015 (Approximate)   SpO2 98%   BMI 20.85 kg/m²       Physical Exam  Vitals and nursing note reviewed. Constitutional:       General: She is not in acute distress. Appearance: Normal appearance. She is well-developed. HENT:      Head: Normocephalic and atraumatic. Right Ear: Tympanic membrane and external ear normal.      Left Ear: Tympanic membrane and external ear normal.      Nose: No rhinorrhea. Eyes:      General: No scleral icterus. Pupils: Pupils are equal, round, and reactive to light. Cardiovascular:      Rate and Rhythm: Normal rate and regular rhythm. Heart sounds: Normal heart sounds. No murmur heard. Pulmonary:      Effort: Pulmonary effort is normal. No respiratory distress. Breath sounds: Normal breath sounds. No wheezing. Abdominal:      Palpations: Abdomen is soft. Tenderness: There is no abdominal tenderness. Musculoskeletal:         General: Normal range of motion. Cervical back: Normal range of motion and neck supple. Right lower leg: No edema. Left lower leg: No edema. Lymphadenopathy:      Cervical: No cervical adenopathy. Skin:     General: Skin is warm and dry. Findings: No rash. Neurological:      Mental Status: She is alert and oriented to person, place, and time. Psychiatric:         Behavior: Behavior normal.         Thought Content: Thought content normal.         Judgment: Judgment normal.      Comments: No suicidal ideations future oriented.                 Data:     Lab Results   Component Value Date     06/03/2021    K 4.1 06/03/2021    CL 98 06/03/2021    CO2 29 06/03/2021    BUN 9 06/03/2021    CREATININE 0.58 06/03/2021    GLUCOSE 106 06/03/2021    PROT 7.4 06/03/2021    LABALBU 4.6 06/03/2021    BILITOT 0.59 06/03/2021    ALKPHOS 63 06/03/2021    AST 20 06/03/2021    ALT 15 06/03/2021     Lab Results   Component Value Date    WBC 6.4 12/02/2020    RBC 3.72 12/02/2020    HGB 12.1 12/02/2020    HCT 35.5 12/02/2020    MCV 95.4 12/02/2020    MCH 32.6 12/02/2020    MCHC 34.2 12/02/2020    RDW 12.8 12/02/2020     12/02/2020    MPV NOT REPORTED 12/02/2020     Lab Results   Component Value Date    TSH 1.99 06/03/2021     Lab Results   Component Value Date    CHOL 215 06/03/2021    CHOL 226 08/19/2019    HDL 69 06/03/2021    LABA1C 5.1 06/20/2020          Assessment & Plan        Diagnosis Orders   1. Anxiety and depression  desvenlafaxine succinate (PRISTIQ) 50 MG TB24 extended release tablet    DISCONTINUED: desvenlafaxine succinate (PRISTIQ) 50 MG TB24 extended release tablet                     Completed Refills   Requested Prescriptions     Signed Prescriptions Disp Refills    vilazodone HCl (VIIBRYD) 20 MG TABS 30 tablet 0     Sig: Take 1 tablet by mouth daily    desvenlafaxine succinate (PRISTIQ) 50 MG TB24 extended release tablet 30 tablet 0     Sig: Take 1 tablet by mouth daily Depression /anxiety     Return in about 3 months (around 12/27/2021) for depression med check.   Orders Placed This Encounter   Medications    DISCONTD: vilazodone HCl (VIIBRYD) 20 MG TABS     Sig: Take 1 tablet by mouth daily     Dispense:  90 tablet     Refill:  0    DISCONTD: desvenlafaxine succinate (PRISTIQ) 50 MG TB24 extended release tablet     Sig: Take 1 tablet by mouth daily Depression /anxiety     Dispense:  90 tablet     Refill:  0    vilazodone HCl (VIIBRYD) 20 MG TABS     Sig: Take 1 tablet by mouth daily     Dispense:  30 tablet     Refill:  0    desvenlafaxine succinate (PRISTIQ) 50 MG TB24 extended release tablet     Sig: Take 1 tablet by mouth daily Depression /anxiety     Dispense:  30 tablet     Refill:  0 No orders of the defined types were placed in this encounter. There are no Patient Instructions on file for this visit. Electronically signed by WENDY Chow CNP on 9/27/2021 at 10:22 PM           Completed Refills   Requested Prescriptions     Signed Prescriptions Disp Refills    vilazodone HCl (VIIBRYD) 20 MG TABS 30 tablet 0     Sig: Take 1 tablet by mouth daily    desvenlafaxine succinate (PRISTIQ) 50 MG TB24 extended release tablet 30 tablet 0     Sig: Take 1 tablet by mouth daily Depression Sylvia Lucas received counseling on the following healthy behaviors: nutrition, exercise and medication adherence  Reviewed prior labs and health maintenance. Continue current medications, diet and exercise. Discussed use, benefit, and side effects of prescribed medications. Barriers to medication compliance addressed. Patient given educational materials - see patient instructions. All patient questions answered. Patient voiced understanding.

## 2021-11-09 ENCOUNTER — HOSPITAL ENCOUNTER (OUTPATIENT)
Age: 56
Setting detail: SPECIMEN
Discharge: HOME | End: 2021-11-09
Payer: COMMERCIAL

## 2021-11-09 ENCOUNTER — OFFICE VISIT (OUTPATIENT)
Dept: OBGYN CLINIC | Age: 56
End: 2021-11-09
Payer: COMMERCIAL

## 2021-11-09 VITALS
BODY MASS INDEX: 20.8 KG/M2 | WEIGHT: 113 LBS | SYSTOLIC BLOOD PRESSURE: 104 MMHG | HEIGHT: 62 IN | DIASTOLIC BLOOD PRESSURE: 64 MMHG

## 2021-11-09 DIAGNOSIS — Z12.31 ENCOUNTER FOR SCREENING MAMMOGRAM FOR BREAST CANCER: ICD-10-CM

## 2021-11-09 DIAGNOSIS — Z01.419 WOMEN'S ANNUAL ROUTINE GYNECOLOGICAL EXAMINATION: ICD-10-CM

## 2021-11-09 DIAGNOSIS — Z01.419 WOMEN'S ANNUAL ROUTINE GYNECOLOGICAL EXAMINATION: Primary | ICD-10-CM

## 2021-11-09 PROCEDURE — G8484 FLU IMMUNIZE NO ADMIN: HCPCS | Performed by: OBSTETRICS & GYNECOLOGY

## 2021-11-09 PROCEDURE — 99396 PREV VISIT EST AGE 40-64: CPT | Performed by: OBSTETRICS & GYNECOLOGY

## 2021-11-09 PROCEDURE — G0145 SCR C/V CYTO,THINLAYER,RESCR: HCPCS

## 2021-11-09 RX ORDER — RALOXIFENE HYDROCHLORIDE 60 MG/1
TABLET, FILM COATED ORAL
Qty: 90 TABLET | Refills: 4 | Status: SHIPPED | OUTPATIENT
Start: 2021-11-09

## 2021-11-09 NOTE — PROGRESS NOTES
YEARLY PHYSICAL    Date of service: 2021    Anthony Kelley  Is a 60 y.o.   female    PT's PCP is: WENDY Corona - [facility]     : 1965                                             Subjective:       Patient's last menstrual period was 2015 (approximate). Are your menses regular: not applicable    OB History    Para Term  AB Living   3 2           SAB IAB Ectopic Molar Multiple Live Births                    # Outcome Date GA Lbr Iam/2nd Weight Sex Delivery Anes PTL Lv   3             2 Para            1 Para                 Social History     Tobacco Use   Smoking Status Never Smoker   Smokeless Tobacco Never Used        Social History     Substance and Sexual Activity   Alcohol Use Yes    Alcohol/week: 3.0 standard drinks    Types: 3 Shots of liquor per week    Comment: occas.        Family History   Problem Relation Age of Onset    Cancer Father         Non-Hodgkins Lymphoma    Diabetes Mother     Hypertension Mother     Cancer Mother         Liver Cancer    Diabetes Brother     No Known Problems Sister        Allergies: Penicillins and Boniva [ibandronic acid]      Current Outpatient Medications:     raloxifene (EVISTA) 60 MG tablet, TAKE 1 TABLET DAILY, Disp: 90 tablet, Rfl: 4    ZOLMitriptan (ZOMIG) 5 MG tablet, Take 1 tablet by mouth daily as needed for Migraine, Disp: 15 tablet, Rfl: 1    vilazodone HCl (VIIBRYD) 20 MG TABS, Take 1 tablet by mouth daily, Disp: 30 tablet, Rfl: 0    desvenlafaxine succinate (PRISTIQ) 50 MG TB24 extended release tablet, Take 1 tablet by mouth daily Depression /anxiety, Disp: 30 tablet, Rfl: 0    atenolol (TENORMIN) 25 MG tablet, TAKE 1 TABLET DAILY, Disp: 90 tablet, Rfl: 3    hydroCHLOROthiazide (HYDRODIURIL) 25 MG tablet, TAKE 1 TABLET DAILY, Disp: 90 tablet, Rfl: 3    NONFORMULARY, Indications: Calcium takes 1000 a day, Disp: , Rfl:    Cholecalciferol (VITAMIN D3) 2000 UNITS CAPS, Take by mouth Indications: takes 1 a day, Disp: , Rfl:     NONFORMULARY, Indications: Orthomega fish oil 1 capsule, Disp: , Rfl:     Social History     Substance and Sexual Activity   Sexual Activity Yes    Partners: Male    Birth control/protection: Post-menopausal       Any bleeding or pain with intercourse: No    Last Yearly:  10/18/19    Last pap: 10/18/19    Last HPV: never    Last Mammogram: 1/8/20    Last Dexascan 10/14/19    Do you do self breast exams: No    Past Medical History:   Diagnosis Date    ASCUS of cervix with negative high risk HPV     Encounter for screening colonoscopy 12/3/2013    Endometriosis     Hypertension     Migraine     Osteopenia        Past Surgical History:   Procedure Laterality Date    BACK SURGERY      COLONOSCOPY      in her 25s    COLONOSCOPY N/A 8/27/2019    COLONOSCOPY Donna Matos MD =normal, repeat in 5-10 years (maternal hx polyps)    LAPAROSCOPY         Family History   Problem Relation Age of Onset    Cancer Father         Non-Hodgkins Lymphoma    Diabetes Mother     Hypertension Mother     Cancer Mother         Liver Cancer    Diabetes Brother     No Known Problems Sister        Any family history of breast or ovarian cancer: No    Any family history of blood clots: No      Chief Complaint   Patient presents with    Gynecologic Exam     pt presents for a pap - no issues          Nurse: sophie    PE:  Vital Signs  Blood pressure 104/64, height 5' 2\" (1.575 m), weight 113 lb (51.3 kg), last menstrual period 09/29/2015. Labs:    No results found for this visit on 11/09/21. HPI: Patient is here today for a yearly exam.  She does need a refill on her Evista.   She is using this for osteopenia    NoPT denies fever, chills, nausea and vomiting       Objective  Lymphatic:   no lymphadenopathy  Heent:   negative   Cor: regular rate and rhythm, no murmurs              Pul:clear to auscultation bilaterally-

## 2021-11-17 ENCOUNTER — HOSPITAL ENCOUNTER (OUTPATIENT)
Dept: MAMMOGRAPHY | Age: 56
Discharge: HOME | End: 2021-11-19
Payer: COMMERCIAL

## 2021-11-17 DIAGNOSIS — Z12.31 ENCOUNTER FOR SCREENING MAMMOGRAM FOR BREAST CANCER: ICD-10-CM

## 2021-11-17 LAB — CYTOLOGY REPORT: NORMAL

## 2021-11-17 PROCEDURE — 77063 BREAST TOMOSYNTHESIS BI: CPT

## 2021-12-27 ENCOUNTER — OFFICE VISIT (OUTPATIENT)
Dept: FAMILY MEDICINE CLINIC | Age: 56
End: 2021-12-27
Payer: COMMERCIAL

## 2021-12-27 VITALS
BODY MASS INDEX: 20.8 KG/M2 | WEIGHT: 113 LBS | HEIGHT: 62 IN | DIASTOLIC BLOOD PRESSURE: 64 MMHG | HEART RATE: 61 BPM | SYSTOLIC BLOOD PRESSURE: 98 MMHG | OXYGEN SATURATION: 94 %

## 2021-12-27 DIAGNOSIS — F41.9 ANXIETY AND DEPRESSION: ICD-10-CM

## 2021-12-27 DIAGNOSIS — F32.A ANXIETY AND DEPRESSION: ICD-10-CM

## 2021-12-27 PROCEDURE — G8484 FLU IMMUNIZE NO ADMIN: HCPCS | Performed by: NURSE PRACTITIONER

## 2021-12-27 PROCEDURE — G8427 DOCREV CUR MEDS BY ELIG CLIN: HCPCS | Performed by: NURSE PRACTITIONER

## 2021-12-27 PROCEDURE — 1036F TOBACCO NON-USER: CPT | Performed by: NURSE PRACTITIONER

## 2021-12-27 PROCEDURE — 99213 OFFICE O/P EST LOW 20 MIN: CPT | Performed by: NURSE PRACTITIONER

## 2021-12-27 PROCEDURE — 3017F COLORECTAL CA SCREEN DOC REV: CPT | Performed by: NURSE PRACTITIONER

## 2021-12-27 PROCEDURE — G8420 CALC BMI NORM PARAMETERS: HCPCS | Performed by: NURSE PRACTITIONER

## 2021-12-27 RX ORDER — DESVENLAFAXINE 50 MG/1
50 TABLET, EXTENDED RELEASE ORAL DAILY
Qty: 30 TABLET | Refills: 1 | Status: SHIPPED | OUTPATIENT
Start: 2021-12-27 | End: 2022-02-07 | Stop reason: SDUPTHER

## 2021-12-27 RX ORDER — VILAZODONE HYDROCHLORIDE 40 MG/1
40 TABLET ORAL DAILY
Qty: 30 TABLET | Refills: 1 | Status: SHIPPED | OUTPATIENT
Start: 2021-12-27 | End: 2022-02-07 | Stop reason: SDUPTHER

## 2021-12-27 NOTE — PROGRESS NOTES
Outpatient Medications   Medication Sig Dispense Refill    desvenlafaxine succinate (PRISTIQ) 50 MG TB24 extended release tablet Take 1 tablet by mouth daily Depression /anxiety 30 tablet 1    vilazodone HCl (VIIBRYD) 40 MG TABS Take 1 tablet by mouth daily Depression 30 tablet 1    raloxifene (EVISTA) 60 MG tablet TAKE 1 TABLET DAILY 90 tablet 4    ZOLMitriptan (ZOMIG) 5 MG tablet Take 1 tablet by mouth daily as needed for Migraine 15 tablet 1    atenolol (TENORMIN) 25 MG tablet TAKE 1 TABLET DAILY 90 tablet 3    NONFORMULARY Indications: Calcium takes 1000 a day      Cholecalciferol (VITAMIN D3) 2000 UNITS CAPS Take by mouth Indications: takes 1 a day      NONFORMULARY Indications: Orthomega fish oil 1 capsule       No current facility-administered medications for this visit. Allergies   Allergen Reactions    Penicillins Swelling     Swelling, rash    Boniva [Ibandronic Acid] Other (See Comments)     Severe joint pain       Health Maintenance   Topic Date Due    Hepatitis C screen  Never done    HIV screen  Never done    DTaP/Tdap/Td vaccine (1 - Tdap) Never done    Shingles Vaccine (1 of 2) Never done    Flu vaccine (1) Never done    COVID-19 Vaccine (3 - Booster for Pfizer series) 03/22/2022    Potassium monitoring  06/03/2022    Creatinine monitoring  06/03/2022    Breast cancer screen  11/17/2023    Cervical cancer screen  11/09/2024    Lipid screen  06/03/2026    Colon cancer screen colonoscopy  08/27/2029    Hepatitis A vaccine  Aged Out    Hepatitis B vaccine  Aged Out    Hib vaccine  Aged Out    Meningococcal (ACWY) vaccine  Aged Out    Pneumococcal 0-64 years Vaccine  Aged Out       Subjective:      Review of Systems   Constitutional: Negative for activity change, chills and fever. HENT: Negative for congestion, rhinorrhea and sore throat. Eyes: Negative. Respiratory: Negative for cough and shortness of breath.     Cardiovascular: Negative for chest pain and leg swelling. Gastrointestinal: Negative for abdominal distention. Endocrine: Negative for cold intolerance and heat intolerance. Genitourinary: Negative for difficulty urinating. Musculoskeletal: Positive for back pain. Negative for arthralgias. Neurological: Negative for dizziness and headaches. Psychiatric/Behavioral: Positive for decreased concentration, dysphoric mood and sleep disturbance. Negative for hallucinations, self-injury and suicidal ideas. The patient is nervous/anxious. Objective:     Physical Exam  Vitals and nursing note reviewed. Constitutional:       General: She is not in acute distress. Appearance: Normal appearance. She is well-developed. HENT:      Head: Normocephalic and atraumatic. Right Ear: Tympanic membrane and external ear normal.      Left Ear: Tympanic membrane and external ear normal.      Nose: No congestion or rhinorrhea. Mouth/Throat:      Mouth: Mucous membranes are moist.   Eyes:      General: No scleral icterus. Pupils: Pupils are equal, round, and reactive to light. Cardiovascular:      Rate and Rhythm: Normal rate and regular rhythm. Heart sounds: Normal heart sounds. No murmur heard. Pulmonary:      Effort: Pulmonary effort is normal. No respiratory distress. Breath sounds: Normal breath sounds. No wheezing. Abdominal:      Palpations: Abdomen is soft. Tenderness: There is no abdominal tenderness. Musculoskeletal:         General: Normal range of motion. Cervical back: Normal range of motion and neck supple. Right lower leg: No edema. Left lower leg: No edema. Lymphadenopathy:      Cervical: No cervical adenopathy. Skin:     General: Skin is warm and dry. Findings: No rash. Neurological:      Mental Status: She is alert and oriented to person, place, and time. Psychiatric:         Behavior: Behavior normal.         Thought Content:  Thought content normal.         Judgment: Judgment normal.       BP 98/64   Pulse 61   Ht 5' 2\" (1.575 m)   Wt 113 lb (51.3 kg)   LMP 09/29/2015 (Approximate)   SpO2 94%   BMI 20.67 kg/m²     Data:     Lab Results   Component Value Date     06/03/2021    K 4.1 06/03/2021    CL 98 06/03/2021    CO2 29 06/03/2021    BUN 9 06/03/2021    CREATININE 0.58 06/03/2021    GLUCOSE 106 06/03/2021    PROT 7.4 06/03/2021    LABALBU 4.6 06/03/2021    BILITOT 0.59 06/03/2021    ALKPHOS 63 06/03/2021    AST 20 06/03/2021    ALT 15 06/03/2021     Lab Results   Component Value Date    WBC 6.4 12/02/2020    RBC 3.72 12/02/2020    HGB 12.1 12/02/2020    HCT 35.5 12/02/2020    MCV 95.4 12/02/2020    MCH 32.6 12/02/2020    MCHC 34.2 12/02/2020    RDW 12.8 12/02/2020     12/02/2020    MPV NOT REPORTED 12/02/2020     Lab Results   Component Value Date    TSH 1.99 06/03/2021     Lab Results   Component Value Date    CHOL 215 06/03/2021    CHOL 226 08/19/2019    HDL 69 06/03/2021    LABA1C 5.1 06/20/2020          Assessment & Plan       1. Anxiety and depression  Wavering control   Pt to start behavioral counseling.       - desvenlafaxine succinate (PRISTIQ) 50 MG TB24 extended release tablet; Take 1 tablet by mouth daily Depression /anxiety  Dispense: 30 tablet; Refill: 1                  Completed Refills   Requested Prescriptions     Signed Prescriptions Disp Refills    desvenlafaxine succinate (PRISTIQ) 50 MG TB24 extended release tablet 30 tablet 1     Sig: Take 1 tablet by mouth daily Depression /anxiety    vilazodone HCl (VIIBRYD) 40 MG TABS 30 tablet 1     Sig: Take 1 tablet by mouth daily Depression     No follow-ups on file.   Orders Placed This Encounter   Medications    desvenlafaxine succinate (PRISTIQ) 50 MG TB24 extended release tablet     Sig: Take 1 tablet by mouth daily Depression /anxiety     Dispense:  30 tablet     Refill:  1    vilazodone HCl (VIIBRYD) 40 MG TABS     Sig: Take 1 tablet by mouth daily Depression     Dispense:  30 tablet     Refill: 1     Medication increase     No orders of the defined types were placed in this encounter. Shannan Pickering received counseling on the following healthy behaviors: nutrition, exercise and medication adherence  Reviewed prior labs and health maintenance. Continue current medications, diet and exercise. Discussed use, benefit, and side effects of prescribed medications. Barriers to medication compliance addressed. Patient given educational materials - see patient instructions. All patient questions answered. Patient voiced understanding. On this date 12/27/2021 I have spent 22 minutes reviewing previous notes, test results and face to face with the patient discussing the diagnosis and importance of compliance with the treatment plan as well as documenting on the day of the visit.      Electronically signed by WENDY Dennison CNP on 12/28/2021 at 9:33 PM

## 2021-12-28 ASSESSMENT — ENCOUNTER SYMPTOMS
EYES NEGATIVE: 1
SORE THROAT: 0
COUGH: 0
BACK PAIN: 1
SHORTNESS OF BREATH: 0
RHINORRHEA: 0
ABDOMINAL DISTENTION: 0

## 2022-02-07 ENCOUNTER — PATIENT MESSAGE (OUTPATIENT)
Dept: FAMILY MEDICINE CLINIC | Age: 57
End: 2022-02-07

## 2022-02-07 DIAGNOSIS — F32.A ANXIETY AND DEPRESSION: ICD-10-CM

## 2022-02-07 DIAGNOSIS — F41.9 ANXIETY AND DEPRESSION: ICD-10-CM

## 2022-02-07 NOTE — TELEPHONE ENCOUNTER
From: Shahla Lincoln  To: Lilian Chandra  Sent: 2/7/2022 8:32 AM EST  Subject: RX Refill    Hi Fabby Queen, I do not have any refills left of the Viibryd and the Pristiq 50 mg at [facility]. Can you please call in the orders in to [facility] so that I can get them by mail?  Thanks, Sherry Osman

## 2022-02-09 RX ORDER — DESVENLAFAXINE 50 MG/1
50 TABLET, EXTENDED RELEASE ORAL DAILY
Qty: 90 TABLET | Refills: 1 | Status: SHIPPED | OUTPATIENT
Start: 2022-02-09 | End: 2022-02-24

## 2022-02-09 RX ORDER — VILAZODONE HYDROCHLORIDE 40 MG/1
40 TABLET ORAL DAILY
Qty: 90 TABLET | Refills: 1 | Status: SHIPPED | OUTPATIENT
Start: 2022-02-09 | End: 2022-02-24

## 2022-02-21 ENCOUNTER — PATIENT MESSAGE (OUTPATIENT)
Dept: PRIMARY CARE CLINIC | Age: 57
End: 2022-02-21

## 2022-02-21 NOTE — TELEPHONE ENCOUNTER
From: Olga Maradiaga  To: Bradly Heribertoms  Sent: 2/21/2022 4:10 PM EST  Subject: Judith Archibald, I have begun to have tremendous back pain, and I assume it's from only being on 50 mg of Pristiq. I am also having diarrhea from the [address] So, can I go back to 100 mg of Pristiq and drop the Viibryd? I know it puts me back where I started from, but the pain is more than I can handle. Thank you for your time and let me know if I need to make an appointment.  Chu Trejo

## 2022-02-24 RX ORDER — VILAZODONE HYDROCHLORIDE 20 MG/1
20 TABLET ORAL DAILY
Qty: 30 TABLET | Refills: 1 | Status: SHIPPED | OUTPATIENT
Start: 2022-02-24 | End: 2022-04-10 | Stop reason: SDUPTHER

## 2022-02-24 RX ORDER — DESVENLAFAXINE 100 MG/1
100 TABLET, EXTENDED RELEASE ORAL DAILY
Qty: 30 TABLET | Refills: 1 | Status: SHIPPED | OUTPATIENT
Start: 2022-02-24 | End: 2022-06-20 | Stop reason: SDUPTHER

## 2022-03-02 ENCOUNTER — OFFICE VISIT (OUTPATIENT)
Dept: PRIMARY CARE CLINIC | Age: 57
End: 2022-03-02
Payer: COMMERCIAL

## 2022-03-02 VITALS
OXYGEN SATURATION: 98 % | DIASTOLIC BLOOD PRESSURE: 78 MMHG | HEIGHT: 62 IN | BODY MASS INDEX: 21.53 KG/M2 | WEIGHT: 117 LBS | SYSTOLIC BLOOD PRESSURE: 118 MMHG | HEART RATE: 90 BPM

## 2022-03-02 DIAGNOSIS — F50.2 BULIMIA NERVOSA, MILD: Primary | ICD-10-CM

## 2022-03-02 PROCEDURE — 1036F TOBACCO NON-USER: CPT | Performed by: NURSE PRACTITIONER

## 2022-03-02 PROCEDURE — G8420 CALC BMI NORM PARAMETERS: HCPCS | Performed by: NURSE PRACTITIONER

## 2022-03-02 PROCEDURE — G8427 DOCREV CUR MEDS BY ELIG CLIN: HCPCS | Performed by: NURSE PRACTITIONER

## 2022-03-02 PROCEDURE — G8484 FLU IMMUNIZE NO ADMIN: HCPCS | Performed by: NURSE PRACTITIONER

## 2022-03-02 PROCEDURE — 3017F COLORECTAL CA SCREEN DOC REV: CPT | Performed by: NURSE PRACTITIONER

## 2022-03-02 PROCEDURE — 99213 OFFICE O/P EST LOW 20 MIN: CPT | Performed by: NURSE PRACTITIONER

## 2022-03-02 ASSESSMENT — PATIENT HEALTH QUESTIONNAIRE - PHQ9
SUM OF ALL RESPONSES TO PHQ9 QUESTIONS 1 & 2: 1
2. FEELING DOWN, DEPRESSED OR HOPELESS: 0
1. LITTLE INTEREST OR PLEASURE IN DOING THINGS: 1
4. FEELING TIRED OR HAVING LITTLE ENERGY: 1
7. TROUBLE CONCENTRATING ON THINGS, SUCH AS READING THE NEWSPAPER OR WATCHING TELEVISION: 1
SUM OF ALL RESPONSES TO PHQ QUESTIONS 1-9: 4
SUM OF ALL RESPONSES TO PHQ QUESTIONS 1-9: 4
3. TROUBLE FALLING OR STAYING ASLEEP: 1
6. FEELING BAD ABOUT YOURSELF - OR THAT YOU ARE A FAILURE OR HAVE LET YOURSELF OR YOUR FAMILY DOWN: 0
SUM OF ALL RESPONSES TO PHQ QUESTIONS 1-9: 4
10. IF YOU CHECKED OFF ANY PROBLEMS, HOW DIFFICULT HAVE THESE PROBLEMS MADE IT FOR YOU TO DO YOUR WORK, TAKE CARE OF THINGS AT HOME, OR GET ALONG WITH OTHER PEOPLE: 1
SUM OF ALL RESPONSES TO PHQ QUESTIONS 1-9: 4
8. MOVING OR SPEAKING SO SLOWLY THAT OTHER PEOPLE COULD HAVE NOTICED. OR THE OPPOSITE, BEING SO FIGETY OR RESTLESS THAT YOU HAVE BEEN MOVING AROUND A LOT MORE THAN USUAL: 0
5. POOR APPETITE OR OVEREATING: 0
9. THOUGHTS THAT YOU WOULD BE BETTER OFF DEAD, OR OF HURTING YOURSELF: 0

## 2022-03-02 NOTE — PROGRESS NOTES
[facility] PRIMARY CARE [address] [address]  Dept: [phone]  Dept Fax: [phone]    Last encounter 12/1/2020    Anxiety (Pt would like to discuss meds and making some changes. Pt states anxiety has gotten worse and doesn't feel meds are working like they should)       HPI:   Mari Grey is a 60 y.o. female who presentstoday for her medical conditions/complaints as noted below. Mari Grey is c/o of Anxiety (Pt would like to discuss meds and making some changes. 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 Going to counseling. Patient feels shocked that she has maintained this for that period of time she has had the dental work done to improve her teeth due to this chronic condition she is willing to have a further evaluation with general surgeon to make sure that her esophagus does not have Hogue's or further disease with the chronic vomiting    Upon further evaluation her arms and legs do not have any hair she says she still has hair in her armpit areas that she has to shave  Her body mass index currently is 21.4 with a weight of 117  Height 5 foot 2    I explained to pt my appreciation for her sharing this information and support given as we work through this disease. Pt is feeling unsure at this time. Is tearful with speaking about it and feels she has taken big steps this week. She also shared it with her  which was also unaware. Pt denies any overt abdominal pain, no blood in vomit.   stooling normal without difficulty.    Last colonoscopy in 2019 with Dr. Amy Fernández normal repeat in 5-10 years. Maternal hx colon polps. Agreeable to see general surgeon . Hx depression and anxiety , no suicidal ideations we have adjusted meds recently due to sadness and lack motivation. pristiq also helps greatly with her back pain. Pt currently weaning down viibryd 40 mg to 20 mg dose, currently doing 40 mg tab alternating with 20 mg tab until 40 mg tabs are gone. (about 2 weeks left)   Pt increased pristiq from 50 mg to 100mg due to  Back pain recurrent (hx back surgery)     Osteopenia on evista. Cbc stable no obvious nutritional deficiencies. Lab Results   Component Value Date    WBC 6.4 12/02/2020    HGB 12.1 12/02/2020    HCT 35.5 (L) 12/02/2020    MCV 95.4 12/02/2020     12/02/2020       Reviewed prior notes None  Reviewed previous Labs, Imaging and Hospital Records    Past Medical History:   Diagnosis Date    ASCUS of cervix with negative high risk HPV     Encounter for screening colonoscopy 12/3/2013    Endometriosis     Hypertension     Migraine     Osteopenia       Past Surgical History:   Procedure Laterality Date    BACK SURGERY      COLONOSCOPY      in her 25s    COLONOSCOPY N/A 8/27/2019    COLONOSCOPY Laura Lopez MD =normal, repeat in 5-10 years (maternal hx polyps)    LAPAROSCOPY         Family History   Problem Relation Age of Onset    Cancer Father         Non-Hodgkins Lymphoma    Diabetes Mother     Hypertension Mother     Cancer Mother         Liver Cancer    Diabetes Brother     No Known Problems Sister        Social History     Tobacco Use    Smoking status: Never Smoker    Smokeless tobacco: Never Used   Substance Use Topics    Alcohol use: Yes     Alcohol/week: 3.0 standard drinks     Types: 3 Shots of liquor per week     Comment: occas.       Current Outpatient Medications   Medication Sig Dispense Refill    vilazodone HCl (VIIBRYD) 20 MG TABS Take 1 tablet by mouth daily Depression 30 tablet 1    desvenlafaxine succinate (PRISTIQ) 100 MG TB24 extended release tablet Take 1 tablet by mouth daily Anxiety, post menopausal hot flashes 30 tablet 1    ZOLMitriptan (ZOMIG) 5 MG tablet Take 1 tablet by mouth daily as needed for Migraine 15 tablet 1    NONFORMULARY Indications: Calcium takes 1000 a day      Cholecalciferol (VITAMIN D3) 2000 UNITS CAPS Take by mouth Indications: takes 1 a day      NONFORMULARY Indications: Orthomega fish oil 1 capsule      raloxifene (EVISTA) 60 MG tablet TAKE 1 TABLET DAILY (Patient not taking: Reported on 3/2/2022) 90 tablet 4    atenolol (TENORMIN) 25 MG tablet TAKE 1 TABLET DAILY (Patient not taking: Reported on 3/2/2022) 90 tablet 3     No current facility-administered medications for this visit. Allergies   Allergen Reactions    Penicillins Swelling     Swelling, rash    Boniva [Ibandronic Acid] Other (See Comments)     Severe joint pain       Health Maintenance   Topic Date Due    Hepatitis C screen  Never done    Depression Monitoring  Never done    HIV screen  Never done    DTaP/Tdap/Td vaccine (1 - Tdap) Never done    Shingles Vaccine (1 of 2) Never done    Flu vaccine (1) Never done    COVID-19 Vaccine (3 - Booster for Pfizer series) 02/22/2022    Potassium monitoring  06/03/2022    Creatinine monitoring  06/03/2022    Breast cancer screen  11/17/2023    Cervical cancer screen  11/09/2024    Lipid screen  06/03/2026    Colorectal Cancer Screen  08/27/2029    Hepatitis A vaccine  Aged Out    Hepatitis B vaccine  Aged Out    Hib vaccine  Aged Out    Meningococcal (ACWY) vaccine  Aged Out    Pneumococcal 0-64 years Vaccine  Aged Out       Subjective:      Review of Systems   Constitutional: Negative for activity change, appetite change, chills and fever. HENT: Negative for congestion, rhinorrhea and sinus pressure. Eyes: Negative. Respiratory: Negative. Negative for cough. Cardiovascular: Negative for chest pain.    Gastrointestinal: Negative for abdominal distention. Endocrine: Negative for cold intolerance and heat intolerance. Genitourinary: Negative for difficulty urinating. Musculoskeletal: Negative for arthralgias. Neurological: Negative for dizziness and headaches. Psychiatric/Behavioral: Positive for decreased concentration. Negative for agitation, dysphoric mood, hallucinations, self-injury, sleep disturbance and suicidal ideas. The patient is nervous/anxious. Objective:     Physical Exam  Vitals and nursing note reviewed. Constitutional:       General: She is not in acute distress. Appearance: Normal appearance. She is well-developed. HENT:      Head: Normocephalic and atraumatic. Right Ear: Tympanic membrane and external ear normal.      Left Ear: Tympanic membrane and external ear normal.      Nose: No congestion. Mouth/Throat:      Mouth: Mucous membranes are moist.   Eyes:      General: No scleral icterus. Pupils: Pupils are equal, round, and reactive to light. Cardiovascular:      Rate and Rhythm: Normal rate and regular rhythm. Heart sounds: Normal heart sounds. No murmur heard. Pulmonary:      Effort: Pulmonary effort is normal. No respiratory distress. Breath sounds: Normal breath sounds. No wheezing. Abdominal:      Palpations: Abdomen is soft. Tenderness: There is no abdominal tenderness. Musculoskeletal:         General: Normal range of motion. Cervical back: Normal range of motion and neck supple. Right lower leg: No edema. Left lower leg: No edema. Lymphadenopathy:      Cervical: No cervical adenopathy. Skin:     General: Skin is warm and dry. Comments: No hair on arms or legs. Axillary hair present   Neurological:      Mental Status: She is alert and oriented to person, place, and time. Psychiatric:         Behavior: Behavior normal.         Thought Content:  Thought content normal.         Judgment: Judgment normal.      Comments: No suicidal ideations. Feels better with recent med change. /78   Pulse 90   Ht 5' 2\" (1.575 m)   Wt 117 lb (53.1 kg)   LMP 09/29/2015 (Approximate)   SpO2 98%   BMI 21.40 kg/m²     Data:     Lab Results   Component Value Date     06/03/2021    K 4.1 06/03/2021    CL 98 06/03/2021    CO2 29 06/03/2021    BUN 9 06/03/2021    CREATININE 0.58 06/03/2021    GLUCOSE 106 06/03/2021    PROT 7.4 06/03/2021    LABALBU 4.6 06/03/2021    BILITOT 0.59 06/03/2021    ALKPHOS 63 06/03/2021    AST 20 06/03/2021    ALT 15 06/03/2021     Lab Results   Component Value Date    WBC 6.4 12/02/2020    RBC 3.72 12/02/2020    HGB 12.1 12/02/2020    HCT 35.5 12/02/2020    MCV 95.4 12/02/2020    MCH 32.6 12/02/2020    MCHC 34.2 12/02/2020    RDW 12.8 12/02/2020     12/02/2020    MPV NOT REPORTED 12/02/2020     Lab Results   Component Value Date    TSH 1.99 06/03/2021     Lab Results   Component Value Date    CHOL 215 06/03/2021    CHOL 226 08/19/2019    HDL 69 06/03/2021    LABA1C 5.1 06/20/2020          Assessment & Plan       There are no diagnoses linked to this encounter. Completed Refills   Requested Prescriptions      No prescriptions requested or ordered in this encounter     No follow-ups on file. No orders of the defined types were placed in this encounter. No orders of the defined types were placed in this encounter. Neisha Lee received counseling on the following healthy behaviors: nutrition, exercise and medication adherence  Reviewed prior labs and health maintenance. Continue current medications, diet and exercise. Discussed use, benefit, and side effects of prescribed medications. Barriers to medication compliance addressed. Patient given educational materials - see patient instructions. All patient questions answered. Patient voiced understanding.             On this date 3/2/2022 I have spent 24 minutes reviewing previous notes, test results and face to face with the patient discussing the diagnosis and importance of compliance with the treatment plan as well as documenting on the day of the visit.      Electronically signed by WENDY Felix CNP on 3/2/2022 at 3:03 PM

## 2022-03-02 NOTE — PATIENT INSTRUCTIONS
You may be receiving a survey from [employer] regarding your visit today. You may get this in the mail, through your MyChart or in your email. Please complete the survey to enable us to provide the highest quality of care to you and your family. If you cannot score us as very good ( 5 Stars) on any question, please feel free to call the office to discuss how we could have made your experience exceptional.     Thank You! WENDY Nguyen-CNP  [address] [facility]  [place]    Phone: [phone]  Fax: [address] [facility] Office Hours:  Monday: [facility] office location 8-5 ([phone]) Offering additional late hours the first Monday of the month until 7 pm.   Tuesday: 8-5 Wednesday: 8-5 Thursday:  Additional hours offered 2 Thursdays a month. Please call to inquire those dates.    Fridays: 7:30-4:30

## 2022-03-07 ASSESSMENT — ENCOUNTER SYMPTOMS
COUGH: 0
RHINORRHEA: 0
RESPIRATORY NEGATIVE: 1
SINUS PRESSURE: 0
ABDOMINAL DISTENTION: 0
EYES NEGATIVE: 1

## 2022-03-30 ENCOUNTER — OFFICE VISIT (OUTPATIENT)
Dept: PRIMARY CARE CLINIC | Age: 57
End: 2022-03-30
Payer: COMMERCIAL

## 2022-03-30 VITALS
TEMPERATURE: 98.3 F | DIASTOLIC BLOOD PRESSURE: 84 MMHG | RESPIRATION RATE: 18 BRPM | HEART RATE: 76 BPM | BODY MASS INDEX: 21.27 KG/M2 | SYSTOLIC BLOOD PRESSURE: 128 MMHG | OXYGEN SATURATION: 98 % | HEIGHT: 62 IN | WEIGHT: 115.6 LBS

## 2022-03-30 DIAGNOSIS — R09.81 SINUS CONGESTION: ICD-10-CM

## 2022-03-30 DIAGNOSIS — H61.21 IMPACTED CERUMEN OF RIGHT EAR: Primary | ICD-10-CM

## 2022-03-30 PROCEDURE — G8420 CALC BMI NORM PARAMETERS: HCPCS | Performed by: NURSE PRACTITIONER

## 2022-03-30 PROCEDURE — 3017F COLORECTAL CA SCREEN DOC REV: CPT | Performed by: NURSE PRACTITIONER

## 2022-03-30 PROCEDURE — 69209 REMOVE IMPACTED EAR WAX UNI: CPT | Performed by: NURSE PRACTITIONER

## 2022-03-30 PROCEDURE — 99213 OFFICE O/P EST LOW 20 MIN: CPT | Performed by: NURSE PRACTITIONER

## 2022-03-30 PROCEDURE — G8427 DOCREV CUR MEDS BY ELIG CLIN: HCPCS | Performed by: NURSE PRACTITIONER

## 2022-03-30 PROCEDURE — G8484 FLU IMMUNIZE NO ADMIN: HCPCS | Performed by: NURSE PRACTITIONER

## 2022-03-30 PROCEDURE — 1036F TOBACCO NON-USER: CPT | Performed by: NURSE PRACTITIONER

## 2022-03-30 RX ORDER — DEXTROMETHORPHAN HYDROBROMIDE, GUAIFENESIN AND PSEUDOEPHEDRINE HYDROCHLORIDE 15; 400; 60 MG/1; MG/1; MG/1
TABLET ORAL
Qty: 28 TABLET | Refills: 0 | Status: SHIPPED | OUTPATIENT
Start: 2022-03-30 | End: 2022-10-17 | Stop reason: ALTCHOICE

## 2022-03-30 NOTE — PATIENT INSTRUCTIONS
Patient Education   Patient Education      Patient Education   Patient Education        Earwax Blockage: Care Instructions  Your Care Instructions     Earwax is a natural substance that protects the ear canal. Normally, earwax drains from the ears and does not cause problems. Sometimes earwax builds up and hardens. Earwax blockage (also called cerumen impaction) can cause some loss of hearing and pain. When wax is tightly packed, you will need to haveyour doctor remove it. Follow-up care is a key part of your treatment and safety. Be sure to make and go to all appointments, and call your doctor if you are having problems. It's also a good idea to know your test results and keep alist of the medicines you take. How can you care for yourself at home?  Do not try to remove earwax with cotton swabs, fingers, or other objects. This can make the blockage worse and damage the eardrum.  If your doctor recommends that you try to remove earwax at home:  ? Soften and loosen the earwax with warm mineral oil. You also can try hydrogen peroxide mixed with an equal amount of room temperature water. Place 2 drops of the fluid, warmed to body temperature, in the ear two times a day for up to 5 days. ? Once the wax is loose and soft, all that is usually needed to remove it from the ear canal is a gentle, warm shower. Direct the water into the ear, then tip your head to let the earwax drain out. Dry your ear thoroughly with a hair dryer set on low. Hold the dryer several inches from your ear. ? If the warm mineral oil and shower do not work, use an over-the-counter wax softener. Read and follow all instructions on the label. After using the wax softener, use an ear syringe to gently flush the ear. Make sure the flushing solution is body temperature. Cool or hot fluids in the ear can cause dizziness. When should you call for help? Call your doctor now or seek immediate medical care if:     Pus or blood drains from your ear.    Your ears are ringing or feel full.      You have a loss of hearing. Watch closely for changes in your health, and be sure to contact your doctor if:     You have pain or reduced hearing after 1 week of home treatment.      You have any new symptoms, such as nausea or balance problems. Where can you learn more? Go to [email or web address] org and sign in to your [employer] account. Enter X503 in the [email or web address] box to learn more about \"Earwax Blockage: Care Instructions. \"     If you do not have an account, please click on the \"Sign Up Now\" link. Current as of: July 1, 2021               Content Version: 13.2  © 2006-2022 [employer]. Care instructions adapted under license by [facility] ([facility]). If you have questions about a medical condition or this instruction, always ask your healthcare professional. [place] [address] any warranty or liability for your use of this information. carbamide peroxide (otic)  Pronunciation: PILAR ba mide per OX daxa [place] tik  Brand: Auraphene-B, Debrox, Ear Wax, Ear Wax Removal, Mollifene, Murine Ear Drops  What is the most important information I should know about carbamide peroxide? You should not use this medicine if you have a hole in your ear drum (ruptured ear drum), or if you have any signs of ear infection or injury, such as pain,warmth, swelling, drainage, or bleeding. What is carbamide peroxide? Carbamide peroxide otic (for the ears) is used to soften and loosen ear wax,making it easier to remove. Carbamide peroxide may also be used for purposes not listed in this medicationguide. What should I discuss with my healthcare provider before using carbamide peroxide? You should not use carbamide peroxide otic if you are allergic to it, or if youhave a hole in your ear drum (ruptured ear drum).   Ask a doctor or pharmacist if it is safe for you to use this medicine if youhave other medical conditions, especially:   recent ear surgery or injury;   ear pain, itching, or other irritation;   drainage, discharge, or bleeding from the ear; or   warmth or swelling around the ear. Carbamide peroxide otic should not be used on a child younger than 15years old. How should I use carbamide peroxide? Use exactly as directed on the label, or as prescribed by your doctor. Do notuse in larger or smaller amounts or for longer than recommended. Carbamide peroxide otic comes with patient instructions for safe and effective use. Follow these directions carefully. Ask your doctor or pharmacist if youhave any questions. Wash your hands before and after using this medicine. To use the ear drops:   Lie down or tilt your head with your ear facing upward. Open the ear canal by gently pulling your ear back, or pulling downward on the earlobe when giving this medicine to a child.  Hold the dropper upside down over your ear and drop the correct number of drops into the ear.  You may hear a bubbling sound inside your ear. This is caused by the foaming action of carbamide peroxide, which helps break up the wax inside your ear.  Stay lying down or with your head tilted for at least 5 minutes. You may use a small piece of cotton to plug the ear and keep the medicine from draining out. Follow your doctor's instructions about the use of cotton.  Do not touch the dropper tip or place it directly in your ear. It may become contaminated. Wipe the tip with a clean tissue but do not wash with water or soap. Carbamide peroxide may be packaged with a bulb syringe that is used to flush out your ear with water. To use the bulb syringe:   Fill the syringe with warm water that is body temperature (no warmer than 98 degrees F). Do not use hot or cold water.  Hold your head sideways with your ear over a sink or bowl.  Gently pull your ear back to open the ear canal. Place the tip of the bulb syringe at the opening of your ear canal. Do not insert the tip into your ear.  Squeeze the bulb syringe gently to release the water into your ear. Do not squirt the water with any force, or you could damage your ear drum.  Remove the syringe and allow the water to drain from your ear into the sink or bowl. Do not use carbamide peroxide for longer than 4 days in a row. Call your doctor if you still have excessive earwax after using this medicine, or if yoursymptoms get worse. Clean the bulb syringe by filling it with plain water and emptying it several times. Do not use soap or other cleaning chemicals. Allow the syringe to airdry. Keep the medicine bottle tightly closed and store it in the outer carton atroom temperature, away from moisture and heat. What happens if I miss a dose? Since carbamide peroxide otic is used when needed, you may not be on a dosing schedule. If you are on a schedule, use the missed dose as soon as you remember. Skip the missed dose if it is almost time for your next scheduled dose. Do not use extra medicine to make up the missed dose. What happens if I overdose? An overdose of carbamide peroxide otic is not expected to be dangerous. Seek emergency medical attention or call the Poison Help line at 1-618.581.8846 Fabby Chamberlain has accidentally swallowed the medication. What should I avoid while using carbamide peroxide? Avoid getting this medicine in your eyes or mouth. Do not use other ear drops unless your doctor has told you to. What are the possible side effects of carbamide peroxide? Get emergency medical help if you have signs of an allergic reaction: hives; difficult breathing; swelling of your face, lips, tongue, or throat. Stop using carbamide peroxide otic and call your doctor at once if you have:   dizziness; or   new or worsening ear problems.   Common side effects may include:   a foaming or crackling sound in the ear after using the ear drops;   temporary decrease in hearing after using the drops;   mild feeling of fullness in the ear; or   mild itching inside the ear. This is not a complete list of side effects and others may occur. Call your doctor for medical advice about side effects. You may report side effects toFDA at 6-341-AXZ-5329. What other drugs will affect carbamide peroxide? It is not likely that other drugs you take orally or inject will have an effect on carbamide peroxide used in the ears. But many drugs can interact with each other. Tell each of your healthcare providers about all medicines you use, including prescription and over-the-counter medicines, vitamins, and herbalproducts. Where can I get more information? Your pharmacist can provide more information about carbamide peroxide. Remember, keep this and all other medicines out of the reach of children, never share your medicines with others, and use this medication only for the indication prescribed. Every effort has been made to ensure that the information provided by Xin [address] is accurate, up-to-date, and complete, but no guarantee is made to that effect. Drug information contained herein may be time sensitive. [facility]'s information has been compiled for use by healthcare practitioners and consumers in the [place] and therefore [facility] does not warrant that uses outside of the [place] are appropriate, unless specifically indicated otherwise. [facility]'s drug information does not endorse drugs, diagnose patients or recommend therapy. [facility] [employer] drug information is an informational resource designed to assist licensed healthcare practitioners in caring for their patients and/or to serve consumers viewing this service as a supplement to, and not a substitute for, the expertise, skill, knowledge and judgment of healthcare practitioners.  The absence of a warning for a given drug or drug combination in no way should be construed to indicate that the drug or drug combination is safe, effective or appropriate for any given patient. [facility] does not assume any responsibility for any aspect of healthcare administered with the aid of information [facility] provides. The information contained herein is not intended to cover all possible uses, directions, precautions, warnings, drug interactions, allergic reactions, or adverse effects. If you have questions about the drugs you are taking, check with yourdoctor, nurse or pharmacist.  Copyright 5645-8012 [address] [place] [address] Dr AMADOR. Version: 4.01. Revision date: 3/15/2017. Care instructions adapted under license by [facility] ([facility]). If you have questions about a medical condition or this instruction, always ask your healthcare professional. [place] [address] any warranty or liability for your use of this information. guaifenesin  Pronunciation: gwye FEN e sin  Brand: Altarussin, Bidex-400, Fenesin IR, Clara-Tussin Expectorant, Mucinex, MucusRelief, Robafen, Scot-Tussin, Siltussin SA, Tussin Expectorant, WIEN  What is the most important information I should know about guaifenesin? Ask a doctor or pharmacist before using this medicine if you have healthproblems or use other medications, or if you are pregnant or breast-feeding. What is guaifenesin? Guaifenesin is used to reduce chest congestion caused by the common cold, flu,or chronic bronchitis. Guaifenesin helps loosen congestion in your chest and throat, making it easierto cough out through your mouth. There are many brands and forms of guaifenesin available. Not all brands arelisted on this leaflet. Guaifenesin may also be used for purposes not listed in this medication guide. What should I discuss with my healthcare provider before taking guaifenesin? You should not use guaifenesin if you are allergic to it. Ask a doctor or pharmacist if it is safe for you to use this medicine if youhave other medical conditions. Ask a doctor before using this medicine if you are pregnant.    You should not breast-feed while using guaifenesin. How should I take guaifenesin? Use exactly as directed on the label, or as prescribed by your doctor. Cold orcough medicine is only for short-term use until your symptoms clear up. Always follow directions on the medicine label about giving cough or cold medicine to a child. Do not use the medicine only to make a child sleepy. Death can occur from the misuse of cough or cold medicines in very youngchildren. Measure liquid medicine carefully. Use the dosing syringe provided, or use a medicine dose-measuringdevice (not a kitchen spoon). To use guaifenesin granules, pour out the entire packet onto your tongue and swallow without chewing. Call your doctor if your symptoms do not improve after 7 days, or if you have afever, rash, or headaches. This medicine can affect the results of certain medical tests. Tell any doctorwho treats you that you are using guaifenesin. Store at room temperature away from moisture and heat. Do not freeze. What happens if I miss a dose? Since cough or cold medicine is used when needed, you may not be on a dosing schedule. Skip any missed dose if it's almost time for your next dose. Do not use two doses at one time. What happens if I overdose? Seek emergency medical attention or call the Poison Help line at 1-156.947.8418. What should I avoid while taking guaifenesin? Ask a doctor or pharmacist before using other cough or cold medicines that maycontain similar ingredients. Avoid driving or hazardous activity until you know how this medicine willaffect you. Your reactions could be impaired. What are the possible side effects of guaifenesin? Get emergency medical help if you have signs of an allergic reaction: hives; difficult breathing; swelling of your face, lips, tongue, or throat. Common side effects may include:   nausea; or   vomiting. This is not a complete list of side effects and others may occur.  Call your doctor for medical advice about side effects. You may report side effects toFDA at 1-800-FDA-4010. What other drugs will affect guaifenesin ? Avoid using this medicine with other drugs that cause drowsiness or slow your breathing (such as opioid medicine, a muscle relaxer, or medicine for anxiety or seizures). Ask a doctor or pharmacist before using any other medication, including prescription and over-the-counter medicines, vitamins, and herbal products. Not all possible drug interactions are listed in this medication guide. Where can I get more information? Your pharmacist can provide more information about guaifenesin. Remember, keep this and all other medicines out of the reach of children, never share your medicines with others, and use this medication only for the indication prescribed. Every effort has been made to ensure that the information provided by Xin Fontanez Dr is accurate, up-to-date, and complete, but no guarantee is made to that effect. Drug information contained herein may be time sensitive. [facility] information has been compiled for use by healthcare practitioners and consumers in the [place] and therefore [facility] does not warrant that uses outside of the [place] are appropriate, unless specifically indicated otherwise. [facility]'s drug information does not endorse drugs, diagnose patients or recommend therapy. [facility]'s drug information is an informational resource designed to assist licensed healthcare practitioners in caring for their patients and/or to serve consumers viewing this service as a supplement to, and not a substitute for, the expertise, skill, knowledge and judgment of healthcare practitioners. The absence of a warning for a given drug or drug combination in no way should be construed to indicate that the drug or drug combination is safe, effective or appropriate for any given patient.  [facility] does not assume any responsibility for any aspect of healthcare administered with the aid of information [facility] provides. The information contained herein is not intended to cover all possible uses, directions, precautions, warnings, drug interactions, allergic reactions, or adverse effects. If you have questions about the drugs you are taking, check with yourdoctor, nurse or pharmacist.  Copyright 7505-8674 Isra [address] [place] [address]. Version: 7.01. Revision date: 2/25/2019. Care instructions adapted under license by [facility] ([facility]). If you have questions about a medical condition or this instruction, always ask your healthcare professional. [place] [address] any warranty or liability for your use of this information. guaifenesin and pseudoephedrine  Pronunciation: gwye FEN e sin, ANDRIY miller ee FED rin  Brand:  Ambifed-G, Despec-SR, Entex T, Maxifed, Mucinex D, Mucinex D Max Strength, Poly-Vent IR, Respaire-30, Robitussin Severe Congestion, Sinutab Non Drying,Triaminic Softchews Chest Congestion, [place] Pediatric Drops  What is the most important information I should know about guaifenesin and pseudoephedrine? Do not give this medication to a child younger than 3years old. Always ask a doctor before giving a cough or cold medicine to a child. Death can occur from the misuse of cough and cold medicines in very youngchildren. What is guaifenesin and pseudoephedrine? There are many brands and forms of guaifenesin and pseudoephedrine availableand not all brands are listed on this leaflet. Guaifenesin is an expectorant. It helps loosen congestion in your chest andthroat, making it easier to cough out through your mouth. Pseudoephedrine is a decongestant that shrinks blood vessels in the nasalpassages. Dilated blood vessels can cause nasal congestion (stuffy nose). Guaifenesin and pseudoephedrine is a combination medicine used to treat stuffy nose and sinus congestion, and to reduce chest congestion caused by the commoncold or flu.   Guaifenesin and pseudoephedrine may also be used for purposes not listed inthis medication guide. What should I discuss with my healthcare provider before taking guaifenesin and pseudoephedrine? You should not use guaifenesin and pseudoephedrine if you are allergic to it. Do not use guaifenesin and pseudoephedrine if you have taken an MAO inhibitor in the past 14 days. A dangerous drug interaction could occur. MAO inhibitors include isocarboxazid, linezolid, phenelzine, rasagiline, selegiline, andtranylcypromine. Ask a doctor or pharmacist if it is safe for you to take this medication if youhave:   high blood pressure, heart disease, coronary artery disease;   diabetes;   circulation problems;   glaucoma;   overactive thyroid; or   enlarged prostate or problems with urination. It is not known if this medication may be harmful to an unborn baby. Do not use this medicine without medical advice if you are pregnant. Guaifenesin and pseudoephedrine may pass into breast milk and may harm a nursing baby. Decongestants may also slow breast milk production. Do not use this medicine without medical advice if you are breast-feeding a baby. Artificially sweetened liquid medicine may contain phenylalanine. Check themedication label if you have phenylketonuria (PKU). How should I take guaifenesin and pseudoephedrine? Use exactly as directed on the label, or as prescribed by your doctor. Do not use in larger or smaller amounts or for longer than recommended. Cough or coldmedicine is usually taken only for a short time until your symptoms clear up. Do not give this medication to a child younger than 3years old. Always ask a doctor before giving cough or cold medicine to a child. Death can occur from the misuse of cough or cold medicine in very youngchildren. Measure liquid medicine with the dosing syringe provided, or with a special dose-measuring spoon or medicine cup. If you do not have a dose-measuringdevice, ask your pharmacist for one.   Do not crush, chew, break, or open an extended-release tablet or capsule. Swallow it whole. The chewable tablet must be chewed before you swallow it. Drink extra fluids to help loosen the congestion and lubricate your throatwhile you are taking this medication. Call your doctor if your symptoms do not improve after 7 days of treatment, orif you have a fever with a headache, cough, or skin rash. Store at room temperature away from moisture and heat. What happens if I miss a dose? Since this medicine is taken when needed, you may not be on a dosing schedule. If you are taking the medication regularly, take the missed dose as soon as you remember. Skip the missed dose if it is almost time for your next scheduled dose. Do not take extra medicine to make up the missed dose. What happens if I overdose? Seek emergency medical attention or call the Poison Help line at 1-248.886.7295. What should I avoid while taking guaifenesin and pseudoephedrine? This medication may impair your thinking or reactions. Be careful if you driveor do anything that requires you to be alert. Drinking alcohol with this medicine can cause side effects. Ask a doctor or pharmacist before using any other cough, cold, or allergy medicine. Guaifenesin and pseudoephedrine are contained in many combination medicines. Taking certain products together can cause you to get too much of acertain drug. What are the possible side effects of guaifenesin and pseudoephedrine? Get emergency medical help if you have signs of an allergic reaction: hives; difficult breathing; swelling of your face, lips, tongue, or throat. Stop using guaifenesin and pseudoephedrine and call your doctor at once if youhave:   fast, pounding, or uneven heartbeat; or   severe anxiety or nervousness.   Common side effects may include:   dry mouth, nose, or throat;   upset stomach, loss of appetite, vomiting;   feeling excited or restless (especially in children);   sleep problems (insomnia); or   headache, dizziness. This is not a complete list of side effects and others may occur. Call your doctor for medical advice about side effects. You may report side effects toFDA at 6-623-AUH-0155. What other drugs will affect guaifenesin and pseudoephedrine? Ask a doctor or pharmacist before using this medicine if you are also using any other drugs, including prescription and over-the-counter medicines, vitamins, and herbal products. Some medicines can cause unwanted or dangerous effects when used together. Not all possible interactions are listed in this medication guide. Taking this medicine with other drugs that make you sleepy or slow your breathing can worsen these effects. Ask your doctor before taking guaifenesin and pseudoephedrine with a sleeping pill, narcotic pain medicine, musclerelaxer, or medicine for anxiety, depression, or seizures. Where can I get more information? Your pharmacist can provide more information about guaifenesin andpseudoephedrine. Remember, keep this and all other medicines out of the reach of children, never share your medicines with others, and use this medication only for the indication prescribed. Every effort has been made to ensure that the information provided by Xin Font[address] Dr is accurate, up-to-date, and complete, but no guarantee is made to that effect. Drug information contained herein may be time sensitive. [facility] information has been compiled for use by healthcare practitioners and consumers in the [place] and therefore [employer] does not warrant that uses outside of the [place] are appropriate, unless specifically indicated otherwise. [facility] drug information does not endorse drugs, diagnose patients or recommend therapy.  [facility] drug information is an informational resource designed to assist licensed healthcare practitioners in caring for their patients and/or to serve consumers viewing this service as a supplement to, and not a substitute for, the expertise, skill, knowledge and judgment of healthcare practitioners. The absence of a warning for a given drug or drug combination in no way should be construed to indicate that the drug or drug combination is safe, effective or appropriate for any given patient. [facility] does not assume any responsibility for any aspect of healthcare administered with the aid of information [facility] provides. The information contained herein is not intended to cover all possible uses, directions, precautions, warnings, drug interactions, allergic reactions, or adverse effects. If you have questions about the drugs you are taking, check with yourdoctor, nurse or pharmacist.  Copyright 1831-7836 [address] [place] [address] Dr AMADOR. Version: 11.06. Revision date: 5/3/2017. Care instructions adapted under license by [facility] ([facility]). If you have questions about a medical condition or this instruction, always ask your healthcare professional. [place] [address] any warranty or liability for your use of this information.

## 2022-03-30 NOTE — PROGRESS NOTES
Chief Complaint:   Sinus Problem (Loss of voice, Post nasal drip, sinus pressure, ear fullness.) and Otalgia (loss of hearing in right ear. Tried debrox a month ago and it hadnt helped.)      History of Present Illness   Source of history provided by:  patient. Shereen Fisher is a 60 y.o. old female presenting to the walk in clinic for evaluation of right ear fullness without pain. According to patient, approximately 1 month ago she seen her PCP who had her use Debrox for cerumen impaction. She never returned to have the cerumen removed. Since then she has noticed symptoms of ear fullness, decreased hearing and irritation in the ear after a shower. She has not had any fevers or drainage from the ear. According to patient the last time she used Debrox with approximately 1 month ago. Decaprice Jitendra is also concerned that she may be getting a sinus infection with 1 day of symptoms including sinus congestion, body aches, and loss of voice. She has not tried anything for the symptoms. ROS   Unless otherwise stated in this report or unable to obtain because of the patient's clinical or mental status as evidenced by the medical record, this patients's positive and negative responses for Review of Systems, constitutional, psych, eyes, ENT, cardiovascular, respiratory, gastrointestinal, neurological, genitourinary, musculoskeletal, integument systems and systems related to the presenting problem are either stated in the preceding or were not pertinent or were negative for the symptoms and/or complaints related to the medical problem. Past Surgical History:  has a past surgical history that includes laparoscopy; Colonoscopy; back surgery; and Colonoscopy (N/A, 8/27/2019). Social History:  reports that she has never smoked. She has never used smokeless tobacco. She reports current alcohol use of about 3.0 standard drinks of alcohol per week. She reports that she does not use drugs.   Family History: family history includes Cancer in her father and mother; Diabetes in her brother and mother; Hypertension in her mother; No Known Problems in her sister. Allergies: Penicillins and Boniva [ibandronic acid]    Physical Exam       VS:  /84 (Site: Right Upper Arm, Position: Sitting, Cuff Size: Small Adult)   Pulse 76   Temp 98.3 °F (36.8 °C) (Oral)   Resp 18   Ht 5' 2\" (1.575 m)   Wt 115 lb 9.6 oz (52.4 kg)   LMP 09/29/2015 (Approximate)   SpO2 98%   BMI 21.14 kg/m²      Constitutional:  Alert, development consistent with age. Ears:   Normal pinna bilaterally. Right TM full view is occluded with a pale yellow firm cerumen impaction. Left TM appears within normal limits. Nose:  No congestion of the nasal mucosa. Turbinates without injection bilaterally. Throat:  Posterior pharynx without injection, exudate, or tonsillar hypertrophy. Airway patient. Neck:  Normal ROM. Supple. No adenopathy. Respiratory:  CTAB without wheezing, rales, or rhonchi  CV: Regular rate and rhythm, normal heart sounds. Skin:  Moist and warm without rashes or lesions. Lymphatic: No lymphangitis or adenopathy noted. Neurological:  Oriented. Motor functions intact. Procedure: After direct visualization with the otoscope  cerumen impaction was identified in the Right ear/s. The procedure for cerumen removal was discussed with Chelsey Soto. The risks were discussed including pain, trauma to the external auditory canal, bleeding, and possible tympanic membrane perforation with loss of hearing. After verbal consent was obtained, Chelsey Soto was draped to prevent soiling of her clothing. With the use of German Clear Ear Irrigation Spray Bottle with OtoClear tip Right ear/s was irrigated 10 times / Bionix lancet-shaped, lighted curette swipe X1, unable to remove cerumen. The external auditory canal appeared normal.  Gladis tolerated the procedure well.       Lab / Imaging Results   (All laboratory and radiology results have been personally reviewed by myself)  Labs:  No results found for this visit on 03/30/22. Medical Decision Making   Pt non-toxic, in no apparent distress and stable at time of discharge. Assessment / Marissa Webster was seen today for sinus problem and otalgia. Diagnoses and all orders for this visit:    Impacted cerumen of right ear  -     LA REMOVAL IMPACTED CERUMEN IRRIGATION/LVG UNILAT    Sinus congestion  -     Pseudoephedrine-DM-GG (CAPMIST DM) 60- MG TABS; 1 capsule every 6 hours by mouth as needed for cough and congestion. Do not exceed 4 capsules per day. Monica Sykes appears well, well-hydrated without distress and is afebrile.   -Discussed symptoms are likely viral and offered influenza testing, she declines today. Discussed symptomatic care including Capmist DM with administration/ADRs, additional symptomatic relief discussed.  -Attempted warm water flush to remove cerumen from right ear unsuccessful. Discussed and encouraged she use a Debrox for the next 4 days and return to this office on Monday to reattempt ear flush. - F/u PCP if symptoms persist. ED sooner if symptoms worsen or change. This visit was provided as a focused evaluation during the COVID -19 pandemic/national emergency. A comprehensive review of all previous patient history and testing was not conducted. Pertinent findings were elicited during the visit.

## 2022-04-04 ENCOUNTER — OFFICE VISIT (OUTPATIENT)
Dept: PRIMARY CARE CLINIC | Age: 57
End: 2022-04-04
Payer: COMMERCIAL

## 2022-04-04 VITALS
SYSTOLIC BLOOD PRESSURE: 126 MMHG | TEMPERATURE: 98.4 F | WEIGHT: 115 LBS | DIASTOLIC BLOOD PRESSURE: 81 MMHG | HEIGHT: 62 IN | RESPIRATION RATE: 18 BRPM | OXYGEN SATURATION: 97 % | BODY MASS INDEX: 21.16 KG/M2 | HEART RATE: 71 BPM

## 2022-04-04 DIAGNOSIS — H61.21 IMPACTED CERUMEN OF RIGHT EAR: Primary | ICD-10-CM

## 2022-04-04 PROCEDURE — 69209 REMOVE IMPACTED EAR WAX UNI: CPT | Performed by: NURSE PRACTITIONER

## 2022-04-04 ASSESSMENT — ENCOUNTER SYMPTOMS
SORE THROAT: 0
RHINORRHEA: 0

## 2022-04-04 NOTE — PATIENT INSTRUCTIONS
Patient Education        Earwax Blockage: Care Instructions  Your Care Instructions     Earwax is a natural substance that protects the ear canal. Normally, earwax drains from the ears and does not cause problems. Sometimes earwax builds up and hardens. Earwax blockage (also called cerumen impaction) can cause some loss of hearing and pain. When wax is tightly packed, you will need to haveyour doctor remove it. Follow-up care is a key part of your treatment and safety. Be sure to make and go to all appointments, and call your doctor if you are having problems. It's also a good idea to know your test results and keep alist of the medicines you take. How can you care for yourself at home?  Do not try to remove earwax with cotton swabs, fingers, or other objects. This can make the blockage worse and damage the eardrum.  If your doctor recommends that you try to remove earwax at home:  ? Soften and loosen the earwax with warm mineral oil. You also can try hydrogen peroxide mixed with an equal amount of room temperature water. Place 2 drops of the fluid, warmed to body temperature, in the ear two times a day for up to 5 days. ? Once the wax is loose and soft, all that is usually needed to remove it from the ear canal is a gentle, warm shower. Direct the water into the ear, then tip your head to let the earwax drain out. Dry your ear thoroughly with a hair dryer set on low. Hold the dryer several inches from your ear. ? If the warm mineral oil and shower do not work, use an over-the-counter wax softener. Read and follow all instructions on the label. After using the wax softener, use an ear syringe to gently flush the ear. Make sure the flushing solution is body temperature. Cool or hot fluids in the ear can cause dizziness. When should you call for help?    Call your doctor now or seek immediate medical care if:     Pus or blood drains from your ear.      Your ears are ringing or feel full.      You have a loss of hearing. Watch closely for changes in your health, and be sure to contact your doctor if:     You have pain or reduced hearing after 1 week of home treatment.      You have any new symptoms, such as nausea or balance problems. Where can you learn more? Go to [email or web address].[employer]. org and sign in to your [employer] account. Enter Z327 in the [facility] box to learn more about \"Earwax Blockage: Care Instructions. \"     If you do not have an account, please click on the \"Sign Up Now\" link. Current as of: July 1, 2021               Content Version: 13.2  © 9974-8770 [employer], [employer]. Care instructions adapted under license by [facility] ([facility]). If you have questions about a medical condition or this instruction, always ask your healthcare professional. [address] any warranty or liability for your use of this information. · Mineral oil, 1 drop to each ear once or twice a month to prevent impaction. · Patient instructions given for cerumen impaction. · To ER or call 911 if any difficulty breathing, shortness of breath, inability to swallow, hives, rash, facial/tongue swelling or temp greater than 103 degrees. · Follow up with PCP or Walk in Care as needed if symptoms worsen or do not improve.

## 2022-04-04 NOTE — PROGRESS NOTES
[address] [facility]  [address] [place] [address]  Dept: [phone]  Dept Fax: [phone]     Darek Huber is a 60 y.o. female who [facility] the [facility] today for hermedical conditions/complaints as noted below. Darek Huber is c/o of Otalgia (Right ear flush.)      HPI:     Otalgia   There is pain in the right ear. This is a new problem. The current episode started in the past 7 days (Seen in Walk in Care on March 30th for cerumen impaction to right ear, attempted flush and sent home to use Debrox to soften. Denies any ear pain or drainage. ). The problem occurs constantly. The problem has been unchanged. There has been no fever. The pain is at a severity of 0/10. The patient is experiencing no pain. Associated symptoms include hearing loss (due to cerumen impaction. ). Pertinent negatives include no rhinorrhea or sore throat. Treatments tried: Debrox ear drops. The treatment provided no relief. There is no history of a chronic ear infection or hearing loss. Past Medical History:   Diagnosis Date    ASCUS of cervix with negative high risk HPV     Encounter for screening colonoscopy 12/3/2013    Endometriosis     Hypertension     Migraine     Osteopenia         Current Outpatient Medications   Medication Sig Dispense Refill    Pseudoephedrine-DM-GG (CAPMIST DM) 60- MG TABS 1 capsule every 6 hours by mouth as needed for cough and congestion. Do not exceed 4 capsules per day.  28 tablet 0    vilazodone HCl (VIIBRYD) 20 MG TABS Take 1 tablet by mouth daily Depression 30 tablet 1    desvenlafaxine succinate (PRISTIQ) 100 MG TB24 extended release tablet Take 1 tablet by mouth daily Anxiety, post menopausal hot flashes 30 tablet 1    raloxifene (EVISTA) 60 MG tablet TAKE 1 TABLET DAILY 90 tablet 4    ZOLMitriptan (ZOMIG) 5 MG tablet Take 1 tablet by mouth daily as needed for Migraine 15 tablet 1  NONFORMULARY Indications: Calcium takes 1000 a day      Cholecalciferol (VITAMIN D3) 2000 UNITS CAPS Take by mouth Indications: takes 1 a day      NONFORMULARY Indications: Orthomega fish oil 1 capsule       No current facility-administered medications for this visit. Allergies   Allergen Reactions    Penicillins Swelling     Swelling, rash    Boniva [Ibandronic Acid] Other (See Comments)     Severe joint pain       Subjective:     Review of Systems   HENT: Positive for hearing loss (due to cerumen impaction. ). Negative for congestion, ear pain, rhinorrhea and sore throat. Objective:      Physical Exam  Vitals and nursing note reviewed. Constitutional:       General: She is not in acute distress. Appearance: Normal appearance. She is well-developed. She is not ill-appearing or diaphoretic. Comments: Well hydrated, nontoxic appearance. HENT:      Head: Normocephalic and atraumatic. Right Ear: Hearing, ear canal and external ear normal. No drainage or tenderness. There is impacted cerumen. No mastoid tenderness. Left Ear: Hearing, tympanic membrane, ear canal and external ear normal. No drainage. No middle ear effusion. No mastoid tenderness. Tympanic membrane is not injected, erythematous or bulging. Nose: Nose normal. No mucosal edema, congestion or rhinorrhea. Right Sinus: No maxillary sinus tenderness or frontal sinus tenderness. Left Sinus: No maxillary sinus tenderness or frontal sinus tenderness. Mouth/Throat:      Lips: Pink. Mouth: Mucous membranes are moist.      Pharynx: Oropharynx is clear. Uvula midline. No pharyngeal swelling, oropharyngeal exudate, posterior oropharyngeal erythema or uvula swelling. Eyes:      Conjunctiva/sclera: Conjunctivae normal.      Pupils: Pupils are equal, round, and reactive to light. Cardiovascular:      Rate and Rhythm: Normal rate.    Pulmonary:      Effort: Pulmonary effort is normal. No accessory muscle usage or respiratory distress. Breath sounds: Normal air entry. Musculoskeletal:      Cervical back: Neck supple. Skin:     General: Skin is warm and dry. Coloration: Skin is not pale. Findings: No erythema or rash. Neurological:      Mental Status: She is alert and oriented to person, place, and time. Psychiatric:         Behavior: Behavior normal. Behavior is cooperative. /81 (Site: Left Upper Arm, Position: Sitting, Cuff Size: Medium Adult)   Pulse 71   Temp 98.4 °F (36.9 °C) (Oral)   Resp 18   Ht 5' 2\" (1.575 m)   Wt 115 lb (52.2 kg)   LMP 09/29/2015 (Approximate)   SpO2 97%   BMI 21.03 kg/m²       PROCEDURE/CERUMEN IMPACTION:  After direct visualization with the otoscope cerumen impaction was identified in the right ear. The procedure for cerumen removal was discussed with patient. The risks were discussed including pain, trauma to the external auditory canal, bleeding, and possible tympanic membrane perforation with loss of hearing. After verbal consent was obtained, patient was draped to prevent soiling of his clothing. Right ear irrigated with BioniKAL German Clear Irrigation system x 2 times using warm water. Tolerated procedure well. Removal of 2 large formed pieces of cerumen from right ear. B/L ear canals clear with intact pearly gray TM. No complications from procedure observed. Assessment:      Diagnosis Orders   1. Impacted cerumen of right ear  VA REMOVAL IMPACTED CERUMEN IRRIGATION/LVG UNILAT       Plan:      No follow-ups on file. No orders of the defined types were placed in this encounter. · Mineral oil, 1 drop to each ear once or twice a month to prevent impaction. · Patient instructions given for cerumen impaction. · To ER or call 911 if any difficulty breathing, shortness of breath, inability to swallow, hives, rash, facial/tongue swelling or temp greater than 103 degrees.    · Follow up with PCP or Walk in Care as needed if symptoms worsen or do not improve. Elaine Jackson received counseling on the following healthy behaviors: increased fluids and rest.  Patient given educational materials - see patient instructions. Discussed use,benefit, and side effects of prescribed medications. Treatment plan discussed at visit. Continue routine health care follow up. All patient questions answered. Pt voiced understanding.       Electronically signed by WENDY Wong CNP on 4/4/2022 at 5:27 PM

## 2022-04-08 ENCOUNTER — PATIENT MESSAGE (OUTPATIENT)
Dept: PRIMARY CARE CLINIC | Age: 57
End: 2022-04-08

## 2022-04-08 NOTE — TELEPHONE ENCOUNTER
From: Bailey Perez  To: Raul eller  Sent: 4/8/2022 9:13 AM EDT  Subject: Viibryd 20mg    Good morning, could you please send a prescription of Viibryd 20 to [facility]? It is less expensive that [facility]. Thanks and Happy Yossi Bess!  Porter Frazier
None known

## 2022-04-10 RX ORDER — VILAZODONE HYDROCHLORIDE 20 MG/1
20 TABLET ORAL DAILY
Qty: 90 TABLET | Refills: 1 | Status: SHIPPED | OUTPATIENT
Start: 2022-04-10 | End: 2022-08-15 | Stop reason: SDUPTHER

## 2022-04-20 ENCOUNTER — OFFICE VISIT (OUTPATIENT)
Dept: SURGERY | Age: 57
End: 2022-04-20
Payer: COMMERCIAL

## 2022-04-20 VITALS
RESPIRATION RATE: 16 BRPM | DIASTOLIC BLOOD PRESSURE: 68 MMHG | BODY MASS INDEX: 21.35 KG/M2 | HEIGHT: 62 IN | HEART RATE: 80 BPM | SYSTOLIC BLOOD PRESSURE: 123 MMHG | WEIGHT: 116 LBS

## 2022-04-20 DIAGNOSIS — R49.0 HOARSENESS: Primary | ICD-10-CM

## 2022-04-20 DIAGNOSIS — F50.2: ICD-10-CM

## 2022-04-20 PROCEDURE — 3017F COLORECTAL CA SCREEN DOC REV: CPT | Performed by: SURGERY

## 2022-04-20 PROCEDURE — 1036F TOBACCO NON-USER: CPT | Performed by: SURGERY

## 2022-04-20 PROCEDURE — G8420 CALC BMI NORM PARAMETERS: HCPCS | Performed by: SURGERY

## 2022-04-20 PROCEDURE — 99203 OFFICE O/P NEW LOW 30 MIN: CPT | Performed by: SURGERY

## 2022-04-20 PROCEDURE — G8427 DOCREV CUR MEDS BY ELIG CLIN: HCPCS | Performed by: SURGERY

## 2022-04-20 NOTE — PROGRESS NOTES
GENERAL SURGERY CONSULTATION      Patient's Name/ Date of Birth/ Gender: Darek Huber / 1965 (60 y.o.) / female     PCP: WENDY Farrell CNP  Referring:     History of present Illness:  Patient is a pleasant 60 y.o. female  kindly referred by WENDY Farrell CNP     Very nice, intelligent lady. She appears much younger than 59 yo. Very forthright in her history of bulimia since age 21. On antidepressants which has helped. History of teeth veneers due to enamel erosion (dentist not aware of her history). She has some mild degree of hoarseness. Denies melena. Denies pain. She looks very good clinically. She had a normal screening colonoscopy by Dr. Sandy Acevedo. Normal BMI. Past Medical History:  has a past medical history of ASCUS of cervix with negative high risk HPV, Encounter for screening colonoscopy, Endometriosis, Hypertension, Migraine, and Osteopenia. Past Surgical History:   Past Surgical History:   Procedure Laterality Date    BACK SURGERY      COLONOSCOPY      in her 25s    COLONOSCOPY N/A 8/27/2019    COLONOSCOPY Adal Avilez MD =normal, repeat in 5-10 years (maternal hx polyps)    LAPAROSCOPY         Social History:  reports that she has never smoked. She has never used smokeless tobacco. She reports current alcohol use of about 3.0 standard drinks of alcohol per week. She reports that she does not use drugs. Family History: family history includes Cancer in her father and mother; Diabetes in her brother and mother; Hypertension in her mother; No Known Problems in her sister.     Review of Systems:   General: Completed and, except as mentioned above, was negative or noncontributory  Psychological:  Completed and, except as mentioned above, was negative or noncontributory  Ophthalmic:  Completed and, except as mentioned above, was negative or noncontributory  ENT:  Completed and, except as mentioned above, was negative or noncontributory  Allergy and Immunology: Completed and, except as mentioned above, was negative or noncontributory  Hematological and Lymphatic:  Completed and, except as mentioned above, was negative or noncontributory  Endocrine: Completed and, except as mentioned above, was negative or noncontributory  Breast:  Completed and, except as mentioned above, was negative or noncontributory  Respiratory:  Completed and, except as mentioned above, was negative or noncontributory  Cardiovascular:  Completed and, except as mentioned above, was negative or noncontributory  Gastrointestinal: Completed and, except as mentioned above, was negative or noncontributory  Genito-Urinary:  Completed and, except as mentioned above, was negative or noncontributory  Musculoskeletal:  Completed and, except as mentioned above, was negative or noncontributory  Neurological:  Completed and, except as mentioned above, was negative or noncontributory  Dermatological:  Completed and, except as mentioned above, was negative or noncontributory    Allergies: Penicillins and Boniva [ibandronic acid]    Current Meds:  Current Outpatient Medications:     vilazodone HCl (VIIBRYD) 20 MG TABS, Take 1 tablet by mouth daily Depression, Disp: 90 tablet, Rfl: 1    desvenlafaxine succinate (PRISTIQ) 100 MG TB24 extended release tablet, Take 1 tablet by mouth daily Anxiety, post menopausal hot flashes, Disp: 30 tablet, Rfl: 1    raloxifene (EVISTA) 60 MG tablet, TAKE 1 TABLET DAILY, Disp: 90 tablet, Rfl: 4    NONFORMULARY, Indications: Calcium takes 1000 a day, Disp: , Rfl:     Cholecalciferol (VITAMIN D3) 2000 UNITS CAPS, Take by mouth Indications: takes 1 a day, Disp: , Rfl:     NONFORMULARY, Indications: Orthomega fish oil 1 capsule, Disp: , Rfl:     Pseudoephedrine-DM-GG (CAPMIST DM) 60- MG TABS, 1 capsule every 6 hours by mouth as needed for cough and congestion.  Do not exceed 4 capsules per day., Disp: 28 tablet, Rfl: 0    ZOLMitriptan (ZOMIG) 5 MG tablet, Take 1 tablet by mouth daily as needed for Migraine, Disp: 15 tablet, Rfl: 1    Physical Exam:  Vital signs and Nurse's note reviewed. /68   Pulse 80   Resp 16   Ht 5' 2\" (1.575 m)   Wt 116 lb (52.6 kg)   LMP 09/29/2015 (Approximate)   Breastfeeding No   BMI 21.22 kg/m²    height is 5' 2\" (1.575 m) and weight is 116 lb (52.6 kg). Her blood pressure is 123/68 and her pulse is 80. Her respiration is 16. Gen:  A&Ox3, NAD. Pleasant and cooperative. HEENT: PERRLA, EOMI, no scleral icterus  Neck:  no goiter  CVS: Regular rate and rhythm  Resp: Good bilateral air entry, no active wheezing, no labored breathing  Abd: soft, non-tender  Ext: Moves all extremities, no gross focal motor deficits  Skin: No erythema or ulcerations     Labs:   Lab Results   Component Value Date    WBC 6.4 12/02/2020    HGB 12.1 12/02/2020    HCT 35.5 12/02/2020    MCV 95.4 12/02/2020     12/02/2020     Lab Results   Component Value Date     06/03/2021    K 4.1 06/03/2021    CL 98 06/03/2021    CO2 29 06/03/2021    BUN 9 06/03/2021    CREATININE 0.58 06/03/2021    GLUCOSE 106 06/03/2021    CALCIUM 9.2 06/03/2021     Lab Results   Component Value Date    ALKPHOS 63 06/03/2021    ALT 15 06/03/2021    AST 20 06/03/2021    PROT 7.4 06/03/2021    BILITOT 0.59 06/03/2021    LABALBU 4.6 06/03/2021     No results found for: AMYLASE  No results found for: LIPASE  No results found for: INR    Radiologic Studies:  EXAM: DEXA BONE DENSITY 2 SITES        HISTORY: Reason for exam:->osteopenia 33-year-old female.        COMPARISON: 11/22/2016 DXA scan on the same machine.            TECHNIQUE: DXA scan lumbar spine and bilateral hips lunar Prodigy RDF scanner    serial number [id number].           FINDINGS: Mild convex left lumbar curve is unchanged.  Bone mineral density    L1-L4 averages 1.176 g/sq cm with a T-score of 0.0, which is normal. This is    not significantly changed.       The bone mineral density of the hips is osteopenic with the total mean bone    mineral density averaging 0.783 g/sq cm with a T-score of -1.8. This shows a    borderline 2% decrease in 2016 when the T-score was -1.7.               Impression       Osteopenia in the hips only barely worsened from 2016. The 10 year probability    of major osteoporotic fracture currently is 7.9% and hip fracture risk is 1.2%. Impressions/Recommendations:     History of chronic bulimia nervosa. Recommend diagnostic EGD to check for inflammation/esophagitis/ulcers/etc. May need prophylactic GI medications but need endoscopic and/or biopsy to confirm inflammation. All questions answered, detailed discussion with patient     Thank you WENDY Reed - CNP for allowing me to participate in the care of this very nice lady.     Rome Conde DO, MPH, [facility] [address] office [phone]  [place] office [phone]

## 2022-05-09 DIAGNOSIS — Z01.818 PRE-OP TESTING: Primary | ICD-10-CM

## 2022-06-09 ENCOUNTER — HOSPITAL ENCOUNTER (OUTPATIENT)
Age: 57
Discharge: HOME | End: 2022-06-09
Payer: COMMERCIAL

## 2022-06-09 DIAGNOSIS — Z01.818 PRE-OP TESTING: ICD-10-CM

## 2022-06-09 PROCEDURE — 93005 ELECTROCARDIOGRAM TRACING: CPT

## 2022-06-10 LAB
EKG ATRIAL RATE: 65 BPM
EKG P AXIS: 79 DEGREES
EKG P-R INTERVAL: 192 MS
EKG Q-T INTERVAL: 398 MS
EKG QRS DURATION: 82 MS
EKG QTC CALCULATION (BAZETT): 413 MS
EKG R AXIS: 71 DEGREES
EKG T AXIS: 73 DEGREES
EKG VENTRICULAR RATE: 65 BPM

## 2022-06-10 PROCEDURE — 93010 ELECTROCARDIOGRAM REPORT: CPT | Performed by: INTERNAL MEDICINE

## 2022-06-10 NOTE — PROGRESS NOTES
Patient states they received prep instructions and which home medications that are to be taken on the day of their procedure with a small sip of water only, from the physician's office.

## 2022-06-20 ENCOUNTER — OFFICE VISIT (OUTPATIENT)
Dept: FAMILY MEDICINE CLINIC | Age: 57
End: 2022-06-20
Payer: COMMERCIAL

## 2022-06-20 VITALS
SYSTOLIC BLOOD PRESSURE: 110 MMHG | OXYGEN SATURATION: 96 % | WEIGHT: 113 LBS | DIASTOLIC BLOOD PRESSURE: 78 MMHG | HEIGHT: 62 IN | HEART RATE: 75 BPM | BODY MASS INDEX: 20.8 KG/M2

## 2022-06-20 DIAGNOSIS — F32.A ANXIETY AND DEPRESSION: Primary | ICD-10-CM

## 2022-06-20 DIAGNOSIS — I10 ESSENTIAL HYPERTENSION, BENIGN: ICD-10-CM

## 2022-06-20 DIAGNOSIS — H40.9 GLAUCOMA OF BOTH EYES, UNSPECIFIED GLAUCOMA TYPE: ICD-10-CM

## 2022-06-20 DIAGNOSIS — F41.9 ANXIETY AND DEPRESSION: Primary | ICD-10-CM

## 2022-06-20 DIAGNOSIS — G43.101 MIGRAINE WITH AURA AND WITH STATUS MIGRAINOSUS, NOT INTRACTABLE: ICD-10-CM

## 2022-06-20 PROCEDURE — 3017F COLORECTAL CA SCREEN DOC REV: CPT | Performed by: NURSE PRACTITIONER

## 2022-06-20 PROCEDURE — G8420 CALC BMI NORM PARAMETERS: HCPCS | Performed by: NURSE PRACTITIONER

## 2022-06-20 PROCEDURE — 1036F TOBACCO NON-USER: CPT | Performed by: NURSE PRACTITIONER

## 2022-06-20 PROCEDURE — G8427 DOCREV CUR MEDS BY ELIG CLIN: HCPCS | Performed by: NURSE PRACTITIONER

## 2022-06-20 PROCEDURE — 99213 OFFICE O/P EST LOW 20 MIN: CPT | Performed by: NURSE PRACTITIONER

## 2022-06-20 RX ORDER — DESVENLAFAXINE 100 MG/1
100 TABLET, EXTENDED RELEASE ORAL DAILY
Qty: 90 TABLET | Refills: 1 | Status: SHIPPED | OUTPATIENT
Start: 2022-06-20

## 2022-06-20 RX ORDER — ZOLMITRIPTAN 5 MG/1
5 TABLET, FILM COATED ORAL DAILY PRN
Qty: 15 TABLET | Refills: 1 | Status: SHIPPED | OUTPATIENT
Start: 2022-06-20

## 2022-06-20 RX ORDER — HYDROCHLOROTHIAZIDE 12.5 MG/1
12.5 CAPSULE, GELATIN COATED ORAL EVERY MORNING
Qty: 30 CAPSULE | Refills: 0 | Status: SHIPPED | OUTPATIENT
Start: 2022-06-20 | End: 2022-10-17

## 2022-06-20 RX ORDER — HYDROCHLOROTHIAZIDE 12.5 MG/1
12.5 CAPSULE, GELATIN COATED ORAL EVERY MORNING
Qty: 30 CAPSULE | Refills: 0 | Status: SHIPPED | OUTPATIENT
Start: 2022-06-20 | End: 2022-06-20 | Stop reason: SDUPTHER

## 2022-06-20 SDOH — ECONOMIC STABILITY: FOOD INSECURITY: WITHIN THE PAST 12 MONTHS, THE FOOD YOU BOUGHT JUST DIDN'T LAST AND YOU DIDN'T HAVE MONEY TO GET MORE.: NEVER TRUE

## 2022-06-20 SDOH — ECONOMIC STABILITY: FOOD INSECURITY: WITHIN THE PAST 12 MONTHS, YOU WORRIED THAT YOUR FOOD WOULD RUN OUT BEFORE YOU GOT MONEY TO BUY MORE.: NEVER TRUE

## 2022-06-20 ASSESSMENT — SOCIAL DETERMINANTS OF HEALTH (SDOH): HOW HARD IS IT FOR YOU TO PAY FOR THE VERY BASICS LIKE FOOD, HOUSING, MEDICAL CARE, AND HEATING?: NOT HARD AT ALL

## 2022-06-20 NOTE — PROGRESS NOTES
[facility]  [facility] [place] [address]  Dept: [phone]  Dept Fax: [phone]    Last encounter 12/27/2021    Anxiety (3 month check up. Pt states meds are working goood, no complaints) and Depression       HPI:   Sandra Calvillo is a 60 y.o. female who presentstoday for her medical conditions/complaints as noted below. Sandra Calvillo is c/o of Anxiety (3 month check up. Pt states meds are working goood, no complaints) and Depression      HPI  Established patient    Follow up anxiety and bulemia which pt working with support group/[place] about this . She is working for better eating habits. Has planned scope related to bulemia EGD this Friday. Dr. Charmaine Macedo controlled with viibryd , pristiq resting okay, no panic times. Hypertension well controlled with low dose microzide. Migraine headaches rare at this time. zomig does work well     Reviewed prior notes None  Reviewed previous Labs, Imaging and Hospital Records    Past Medical History:   Diagnosis Date    ASCUS of cervix with negative high risk HPV     Encounter for screening colonoscopy 12/3/2013    Endometriosis     Hypertension     Migraine     Osteopenia       Past Surgical History:   Procedure Laterality Date    BACK SURGERY      COLONOSCOPY      in her 25s    COLONOSCOPY N/A 8/27/2019    COLONOSCOPY Argelia Linder MD =normal, repeat in 5-10 years (maternal hx polyps)    LAPAROSCOPY         Family History   Problem Relation Age of Onset    Cancer Father         Non-Hodgkins Lymphoma    Diabetes Mother     Hypertension Mother     Cancer Mother         Liver Cancer    Diabetes Brother     No Known Problems Sister        Social History     Tobacco Use    Smoking status: Never Smoker    Smokeless tobacco: Never Used   Substance Use Topics    Alcohol use: Yes     Alcohol/week: 3.0 standard drinks     Types: 3 Shots of liquor per week     Comment: occas. Current Outpatient Medications   Medication Sig Dispense Refill    ZOLMitriptan (ZOMIG) 5 MG tablet Take 1 tablet by mouth daily as needed for Migraine 15 tablet 1    desvenlafaxine succinate (PRISTIQ) 100 MG TB24 extended release tablet Take 1 tablet by mouth daily Anxiety, post menopausal hot flashes 90 tablet 1    hydroCHLOROthiazide (MICROZIDE) 12.5 MG capsule Take 1 capsule by mouth every morning hypertension 30 capsule 0    vilazodone HCl (VIIBRYD) 20 MG TABS Take 1 tablet by mouth daily Depression 90 tablet 1    raloxifene (EVISTA) 60 MG tablet TAKE 1 TABLET DAILY 90 tablet 4    NONFORMULARY Indications: Calcium takes 1000 a day      NONFORMULARY Indications: Orthomega fish oil 1 capsule      Pseudoephedrine-DM-GG (CAPMIST DM) 60- MG TABS 1 capsule every 6 hours by mouth as needed for cough and congestion. Do not exceed 4 capsules per day. 28 tablet 0     No current facility-administered medications for this visit. Allergies   Allergen Reactions    Penicillins Swelling     Swelling, rash    Boniva [Ibandronic Acid] Other (See Comments)     Severe joint pain       Health Maintenance   Topic Date Due    HIV screen  Never done    Hepatitis C screen  Never done    DTaP/Tdap/Td vaccine (1 - Tdap) Never done    Shingles vaccine (1 of 2) Never done    COVID-19 Vaccine (3 - Booster for Easton Peter series) 02/22/2022    Flu vaccine (Season Ended) 09/01/2022    Depression Monitoring  03/02/2023    Breast cancer screen  11/17/2023    Cervical cancer screen  11/09/2024    Lipids  06/03/2026    Colorectal Cancer Screen  08/27/2029    Hepatitis A vaccine  Aged Out    Hepatitis B vaccine  Aged Out    Hib vaccine  Aged Out    Meningococcal (ACWY) vaccine  Aged Out    Pneumococcal 0-64 years Vaccine  Aged Out       Subjective:      Review of Systems   Constitutional: Negative for activity change, chills and fever. HENT: Negative for congestion and rhinorrhea. Eyes: Negative. Respiratory: Negative for cough, chest tightness and shortness of breath. Cardiovascular: Negative for chest pain and leg swelling. Gastrointestinal: Negative for abdominal distention. Endocrine: Negative for cold intolerance and heat intolerance. Genitourinary: Negative for difficulty urinating. Musculoskeletal: Negative for arthralgias. Skin: Negative for rash. Neurological: Negative for dizziness and headaches. Hematological: Negative for adenopathy. Psychiatric/Behavioral: Positive for sleep disturbance. Negative for decreased concentration, self-injury and suicidal ideas. The patient is nervous/anxious. Objective:     Physical Exam  Vitals and nursing note reviewed. Constitutional:       General: She is not in acute distress. Appearance: Normal appearance. She is well-developed. HENT:      Head: Normocephalic and atraumatic. Right Ear: Tympanic membrane and external ear normal.      Left Ear: Tympanic membrane and external ear normal.      Nose: No congestion. Mouth/Throat:      Mouth: Mucous membranes are moist.   Eyes:      General: No scleral icterus. Pupils: Pupils are equal, round, and reactive to light. Neck:      Vascular: No carotid bruit (neg mina). Cardiovascular:      Rate and Rhythm: Normal rate and regular rhythm. Heart sounds: Normal heart sounds. No murmur heard. Pulmonary:      Effort: Pulmonary effort is normal. No respiratory distress. Breath sounds: Normal breath sounds. No wheezing. Abdominal:      Palpations: Abdomen is soft. Tenderness: There is no abdominal tenderness. Musculoskeletal:         General: Normal range of motion. Cervical back: Normal range of motion and neck supple. Right lower leg: No edema. Left lower leg: No edema. Lymphadenopathy:      Cervical: No cervical adenopathy. Skin:     General: Skin is warm and dry. Findings: No rash.    Neurological:      Mental Status: She is alert and oriented to person, place, and time. Comments: Future oriented no suicidal ideations. Psychiatric:         Behavior: Behavior normal.         Thought Content: Thought content normal.         Judgment: Judgment normal.       /78   Pulse 75   Ht 5' 2\" (1.575 m)   Wt 113 lb (51.3 kg)   LMP 09/29/2015 (Approximate)   SpO2 96%   BMI 20.67 kg/m²     Data:     Lab Results   Component Value Date     06/03/2021    K 4.1 06/03/2021    CL 98 06/03/2021    CO2 29 06/03/2021    BUN 9 06/03/2021    CREATININE 0.58 06/03/2021    GLUCOSE 106 06/03/2021    PROT 7.4 06/03/2021    LABALBU 4.6 06/03/2021    BILITOT 0.59 06/03/2021    ALKPHOS 63 06/03/2021    AST 20 06/03/2021    ALT 15 06/03/2021     Lab Results   Component Value Date    WBC 6.4 12/02/2020    RBC 3.72 12/02/2020    HGB 12.1 12/02/2020    HCT 35.5 12/02/2020    MCV 95.4 12/02/2020    MCH 32.6 12/02/2020    MCHC 34.2 12/02/2020    RDW 12.8 12/02/2020     12/02/2020    MPV NOT REPORTED 12/02/2020     Lab Results   Component Value Date    TSH 1.99 06/03/2021     Lab Results   Component Value Date    CHOL 215 06/03/2021    CHOL 226 08/19/2019    HDL 69 06/03/2021    LABA1C 5.1 06/20/2020          Assessment & Plan       1. Anxiety and depression  Well controlled with viibyd   - desvenlafaxine succinate (PRISTIQ) 100 MG TB24 extended release tablet; Take 1 tablet by mouth daily Anxiety, post menopausal hot flashes  Dispense: 90 tablet; Refill: 1    2. Migraine with aura and with status migrainosus, not intractable    - ZOLMitriptan (ZOMIG) 5 MG tablet; Take 1 tablet by mouth daily as needed for Migraine  Dispense: 15 tablet; Refill: 1    3. Essential hypertension, benign    - hydroCHLOROthiazide (MICROZIDE) 12.5 MG capsule; Take 1 capsule by mouth every morning hypertension  Dispense: 30 capsule; Refill: 0    4.  Glaucoma of both eyes, unspecified glaucoma type                    Completed Refills   Requested Prescriptions     Signed Prescriptions Disp Refills    ZOLMitriptan (ZOMIG) 5 MG tablet 15 tablet 1     Sig: Take 1 tablet by mouth daily as needed for Migraine    desvenlafaxine succinate (PRISTIQ) 100 MG TB24 extended release tablet 90 tablet 1     Sig: Take 1 tablet by mouth daily Anxiety, post menopausal hot flashes    hydroCHLOROthiazide (MICROZIDE) 12.5 MG capsule 30 capsule 0     Sig: Take 1 capsule by mouth every morning hypertension     Return in about 4 months (around 10/20/2022) for physical-insurance. Orders Placed This Encounter   Medications    ZOLMitriptan (ZOMIG) 5 MG tablet     Sig: Take 1 tablet by mouth daily as needed for Migraine     Dispense:  15 tablet     Refill:  1    DISCONTD: hydroCHLOROthiazide (MICROZIDE) 12.5 MG capsule     Sig: Take 1 capsule by mouth every morning hypertension     Dispense:  30 capsule     Refill:  0    desvenlafaxine succinate (PRISTIQ) 100 MG TB24 extended release tablet     Sig: Take 1 tablet by mouth daily Anxiety, post menopausal hot flashes     Dispense:  90 tablet     Refill:  1     Ramping up    hydroCHLOROthiazide (MICROZIDE) 12.5 MG capsule     Sig: Take 1 capsule by mouth every morning hypertension     Dispense:  30 capsule     Refill:  0     No orders of the defined types were placed in this encounter. On this date 6/20/2022 I have spent 31 minutes reviewing previous notes, test results and face to face with the patient discussing the diagnosis and importance of compliance with the treatment plan as well as documenting on the day of the visit.      Electronically signed by WENDY Mcdermott CNP on 6/24/2022 at 12:27 AM

## 2022-06-20 NOTE — PATIENT INSTRUCTIONS
You may be receiving a survey from [employer] regarding your visit today. You may get this in the mail, through your MyChart or in your email. Please complete the survey to enable us to provide the highest quality of care to you and your family. If you cannot score us as very good ( 5 Stars) on any question, please feel free to call the office to discuss how we could have made your experience exceptional.     Thank You! WENDY Miller-CNP  [address] Adri Connell LPN        Phone: [phone]  Fax: [address] Office Hours:  Monday: [facility] office location 8-5 ([phone]) Offering additional late hours the first Monday of the month until 7 pm.   Tuesday: 8-5 Wednesday: 8-5 Thursday:  Additional hours offered 2 Thursdays a month. Please call to inquire those dates.    Fridays: 7:30-4:30  Patient Education

## 2022-06-23 ENCOUNTER — ANESTHESIA EVENT (OUTPATIENT)
Dept: OPERATING ROOM | Age: 57
End: 2022-06-23
Payer: COMMERCIAL

## 2022-06-24 ENCOUNTER — HOSPITAL ENCOUNTER (OUTPATIENT)
Age: 57
Setting detail: OUTPATIENT SURGERY
Discharge: HOME | End: 2022-06-24
Attending: SURGERY | Admitting: SURGERY
Payer: COMMERCIAL

## 2022-06-24 ENCOUNTER — ANESTHESIA (OUTPATIENT)
Dept: OPERATING ROOM | Age: 57
End: 2022-06-24
Payer: COMMERCIAL

## 2022-06-24 VITALS
OXYGEN SATURATION: 99 % | SYSTOLIC BLOOD PRESSURE: 116 MMHG | RESPIRATION RATE: 16 BRPM | HEIGHT: 63 IN | DIASTOLIC BLOOD PRESSURE: 78 MMHG | TEMPERATURE: 97.5 F | HEART RATE: 72 BPM | WEIGHT: 113 LBS | BODY MASS INDEX: 20.02 KG/M2

## 2022-06-24 DIAGNOSIS — F50.2 BULIMIA: ICD-10-CM

## 2022-06-24 PROCEDURE — 3700000000 HC ANESTHESIA ATTENDED CARE: Performed by: SURGERY

## 2022-06-24 PROCEDURE — 43239 EGD BIOPSY SINGLE/MULTIPLE: CPT | Performed by: SURGERY

## 2022-06-24 PROCEDURE — 7100000010 HC PHASE II RECOVERY - FIRST 15 MIN: Performed by: SURGERY

## 2022-06-24 PROCEDURE — 2500000003 HC RX 250 WO HCPCS: Performed by: NURSE ANESTHETIST, CERTIFIED REGISTERED

## 2022-06-24 PROCEDURE — 3609012400 HC EGD TRANSORAL BIOPSY SINGLE/MULTIPLE: Performed by: SURGERY

## 2022-06-24 PROCEDURE — 6360000002 HC RX W HCPCS: Performed by: NURSE ANESTHETIST, CERTIFIED REGISTERED

## 2022-06-24 PROCEDURE — 87077 CULTURE AEROBIC IDENTIFY: CPT

## 2022-06-24 PROCEDURE — 7100000011 HC PHASE II RECOVERY - ADDTL 15 MIN: Performed by: SURGERY

## 2022-06-24 PROCEDURE — 2580000003 HC RX 258: Performed by: NURSE ANESTHETIST, CERTIFIED REGISTERED

## 2022-06-24 PROCEDURE — 2709999900 HC NON-CHARGEABLE SUPPLY: Performed by: SURGERY

## 2022-06-24 RX ORDER — SODIUM CHLORIDE 0.9 % (FLUSH) 0.9 %
5-40 SYRINGE (ML) INJECTION EVERY 12 HOURS SCHEDULED
Status: DISCONTINUED | OUTPATIENT
Start: 2022-06-24 | End: 2022-06-24 | Stop reason: HOSPADM

## 2022-06-24 RX ORDER — SODIUM CHLORIDE 0.9 % (FLUSH) 0.9 %
5-40 SYRINGE (ML) INJECTION PRN
Status: DISCONTINUED | OUTPATIENT
Start: 2022-06-24 | End: 2022-06-24 | Stop reason: HOSPADM

## 2022-06-24 RX ORDER — PROPOFOL 10 MG/ML
INJECTION, EMULSION INTRAVENOUS PRN
Status: DISCONTINUED | OUTPATIENT
Start: 2022-06-24 | End: 2022-06-24 | Stop reason: SDUPTHER

## 2022-06-24 RX ORDER — SODIUM CHLORIDE, SODIUM LACTATE, POTASSIUM CHLORIDE, CALCIUM CHLORIDE 600; 310; 30; 20 MG/100ML; MG/100ML; MG/100ML; MG/100ML
INJECTION, SOLUTION INTRAVENOUS CONTINUOUS
Status: DISCONTINUED | OUTPATIENT
Start: 2022-06-24 | End: 2022-06-24 | Stop reason: HOSPADM

## 2022-06-24 RX ORDER — LIDOCAINE HYDROCHLORIDE 20 MG/ML
INJECTION, SOLUTION EPIDURAL; INFILTRATION; INTRACAUDAL; PERINEURAL PRN
Status: DISCONTINUED | OUTPATIENT
Start: 2022-06-24 | End: 2022-06-24 | Stop reason: SDUPTHER

## 2022-06-24 RX ORDER — SODIUM CHLORIDE 9 MG/ML
INJECTION, SOLUTION INTRAVENOUS PRN
Status: DISCONTINUED | OUTPATIENT
Start: 2022-06-24 | End: 2022-06-24 | Stop reason: HOSPADM

## 2022-06-24 RX ADMIN — PROPOFOL 80 MG: 10 INJECTION, EMULSION INTRAVENOUS at 13:15

## 2022-06-24 RX ADMIN — LIDOCAINE HYDROCHLORIDE 150 MG: 20 INJECTION, SOLUTION EPIDURAL; INFILTRATION; INTRACAUDAL; PERINEURAL at 13:15

## 2022-06-24 RX ADMIN — SODIUM CHLORIDE, POTASSIUM CHLORIDE, SODIUM LACTATE AND CALCIUM CHLORIDE: 600; 310; 30; 20 INJECTION, SOLUTION INTRAVENOUS at 12:33

## 2022-06-24 ASSESSMENT — PAIN - FUNCTIONAL ASSESSMENT: PAIN_FUNCTIONAL_ASSESSMENT: NONE - DENIES PAIN

## 2022-06-24 ASSESSMENT — ENCOUNTER SYMPTOMS
COUGH: 0
SHORTNESS OF BREATH: 0
CHEST TIGHTNESS: 0
ABDOMINAL DISTENTION: 0
EYES NEGATIVE: 1
RHINORRHEA: 0

## 2022-06-24 ASSESSMENT — LIFESTYLE VARIABLES: SMOKING_STATUS: 0

## 2022-06-24 NOTE — H&P
GENERAL SURGERY CONSULTATION        Patient's Name/ Date of Birth/ Gender: Luther Lyles / 1965 (60 y.o.) / female      PCP: WENDY Mckee CNP  Referring:      History of present Illness:  Patient is a pleasant 60 y.o. female  kindly referred by WENDY Mckee CNP      Very nice, intelligent lady. She appears much younger than 59 yo. Very forthright in her history of bulimia since age 21. On antidepressants which has helped. History of teeth veneers due to enamel erosion (dentist not aware of her history). She has some mild degree of hoarseness. Denies melena. Denies pain. She looks very good clinically. She had a normal screening colonoscopy by Dr. Jessie Mcghee. Normal BMI.      Past Medical History:  has a past medical history of ASCUS of cervix with negative high risk HPV, Encounter for screening colonoscopy, Endometriosis, Hypertension, Migraine, and Osteopenia.     Past Surgical History:   Past Surgical History         Past Surgical History:   Procedure Laterality Date    BACK SURGERY        COLONOSCOPY         in her 25s    COLONOSCOPY N/A 8/27/2019     COLONOSCOPY Fabiola Parrish MD =normal, repeat in 5-10 years (maternal hx polyps)    LAPAROSCOPY                Social History:  reports that she has never smoked. She has never used smokeless tobacco. She reports current alcohol use of about 3.0 standard drinks of alcohol per week.  She reports that she does not use drugs.     Family History: family history includes Cancer in her father and mother; Diabetes in her brother and mother; Hypertension in her mother; No Known Problems in her sister.     Review of Systems:   General: Completed and, except as mentioned above, was negative or noncontributory  Psychological:  Completed and, except as mentioned above, was negative or noncontributory  Ophthalmic:  Completed and, except as mentioned above, was negative or noncontributory  ENT:  Completed and, except as mentioned above, was negative or noncontributory  Allergy and Immunology:  Completed and, except as mentioned above, was negative or noncontributory  Hematological and Lymphatic:  Completed and, except as mentioned above, was negative or noncontributory  Endocrine: Completed and, except as mentioned above, was negative or noncontributory  Breast:  Completed and, except as mentioned above, was negative or noncontributory  Respiratory:  Completed and, except as mentioned above, was negative or noncontributory  Cardiovascular:  Completed and, except as mentioned above, was negative or noncontributory  Gastrointestinal: Completed and, except as mentioned above, was negative or noncontributory  Genito-Urinary:  Completed and, except as mentioned above, was negative or noncontributory  Musculoskeletal:  Completed and, except as mentioned above, was negative or noncontributory  Neurological:  Completed and, except as mentioned above, was negative or noncontributory  Dermatological:  Completed and, except as mentioned above, was negative or noncontributory     Allergies: Penicillins and Boniva [ibandronic acid]     Current Meds:  Current Medication     Current Outpatient Medications:     vilazodone HCl (VIIBRYD) 20 MG TABS, Take 1 tablet by mouth daily Depression, Disp: 90 tablet, Rfl: 1    desvenlafaxine succinate (PRISTIQ) 100 MG TB24 extended release tablet, Take 1 tablet by mouth daily Anxiety, post menopausal hot flashes, Disp: 30 tablet, Rfl: 1    raloxifene (EVISTA) 60 MG tablet, TAKE 1 TABLET DAILY, Disp: 90 tablet, Rfl: 4    NONFORMULARY, Indications: Calcium takes 1000 a day, Disp: , Rfl:     Cholecalciferol (VITAMIN D3) 2000 UNITS CAPS, Take by mouth Indications: takes 1 a day, Disp: , Rfl:     NONFORMULARY, Indications: Orthomega fish oil 1 capsule, Disp: , Rfl:     Pseudoephedrine-DM-GG (CAPMIST DM) 60- MG TABS, 1 capsule every 6 hours by mouth as needed for cough and congestion.  Do not exceed 4 capsules per day., Disp: 28 tablet, Rfl: 0    ZOLMitriptan (ZOMIG) 5 MG tablet, Take 1 tablet by mouth daily as needed for Migraine, Disp: 15 tablet, Rfl: 1        Physical Exam:  Vital signs and Nurse's note reviewed. /68   Pulse 80   Resp 16   Ht 5' 2\" (1.575 m)   Wt 116 lb (52.6 kg)   LMP 09/29/2015 (Approximate)   Breastfeeding No   BMI 21.22 kg/m²    height is 5' 2\" (1.575 m) and weight is 116 lb (52.6 kg). Her blood pressure is 123/68 and her pulse is 80. Her respiration is 16. Gen:  A&Ox3, NAD. Pleasant and cooperative. HEENT: PERRLA, EOMI, no scleral icterus  Neck:  no goiter  CVS: Regular rate and rhythm  Resp: Good bilateral air entry, no active wheezing, no labored breathing  Abd: soft, non-tender  Ext: Moves all extremities, no gross focal motor deficits  Skin: No erythema or ulcerations      Labs:         Lab Results   Component Value Date     WBC 6.4 12/02/2020     HGB 12.1 12/02/2020     HCT 35.5 12/02/2020     MCV 95.4 12/02/2020      12/02/2020            Lab Results   Component Value Date      06/03/2021     K 4.1 06/03/2021     CL 98 06/03/2021     CO2 29 06/03/2021     BUN 9 06/03/2021     CREATININE 0.58 06/03/2021     GLUCOSE 106 06/03/2021     CALCIUM 9.2 06/03/2021            Lab Results   Component Value Date     ALKPHOS 63 06/03/2021     ALT 15 06/03/2021     AST 20 06/03/2021     PROT 7.4 06/03/2021     BILITOT 0.59 06/03/2021     LABALBU 4.6 06/03/2021      No results found for: AMYLASE  No results found for: LIPASE  No results found for: INR     Radiologic Studies:  EXAM: DEXA BONE DENSITY 2 SITES        HISTORY: Reason for exam:->osteopenia 66-year-old female.        COMPARISON: 11/22/2016 DXA scan on the same machine.            TECHNIQUE: DXA scan lumbar spine and bilateral hips lunar [employer] RDF scanner    serial number [id number].           FINDINGS: Mild convex left lumbar curve is unchanged.  Bone mineral density    L1-L4 averages 1.176 g/sq cm with a T-score of 0.0, which is normal. This is    not significantly changed.       The bone mineral density of the hips is osteopenic with the total mean bone    mineral density averaging 0.783 g/sq cm with a T-score of -1.8. This shows a    borderline 2% decrease in 2016 when the T-score was -1.7.               Impression       Osteopenia in the hips only barely worsened from 2016. The 10 year probability    of major osteoporotic fracture currently is 7.9% and hip fracture risk is 1.2%.         Impressions/Recommendations:      History of chronic bulimia nervosa. Recommend diagnostic EGD to check for inflammation/esophagitis/ulcers/etc. May need prophylactic GI medications but need endoscopic and/or biopsy to confirm inflammation. All questions answered, detailed discussion with patient      H&P  General Surgery        Pt Name: Bailey Perez  MRN: [id number]  YOB: 1965  Date of evaluation: 6/24/2022      [x] I have examined the patient and reviewed the H&P/Consult completed, and there are no changes to the patient or plans. [] I have examined the patient and reviewed the H&P/Consult and have noted the following changes: The patient was counseled at length about the risks of wendi Covid-19 during their perioperative period and any recovery window from their procedure. The patient was made aware that wendi Covid-19  may worsen their prognosis for recovering from their procedure  and lend to a higher morbidity and/or mortality risk. All material risks, benefits, and reasonable alternatives including postponing the procedure were discussed. The patient does wish to proceed with the procedure at this time.         Electronically signed by Mckinley Kate DO  on 6/24/2022 at 12:16 PM

## 2022-06-24 NOTE — PROGRESS NOTES
Patient verbalizes readiness for discharge at this time. All criteria met. Discharge Criteria    Inpatients must meet Criteria 1 through 7. All other patients are either YES or N/A. If a NO is chosen then Anesthesia or Surgeon must be notified. 1.  Minimum 30 minutes after last dose of sedative medication, minimum 120 minutes after last dose of reversal agent. Yes      2. Systolic BP stable within 20 mmHg for 30 minutes & systolic BP between 90 & 685 or within 10 mmHg of baseline. Yes      3. Pulse between 60 and 100 or within 10 bpm of baseline. Yes      4. Spontaneous respiratory rate >/= 10 per minute. Yes      5. SaO2 >/= 95 or  >/= baseline. Yes      6. Able to cough and swallow or return to baseline function. Yes      7. Alert and oriented or return to baseline mental status. Yes      8. Demonstrates controlled, coordinated movements, ambulates with steady gait, or return to baseline activity function. Yes      9. Minimal or no pain or nausea, or at a level tolerable and acceptable to patient. Yes      10. Takes and retains oral fluids as allowed. Yes      11. Procedural / perioperative site stable. Minimal or no bleeding. Yes          12. If GI endoscopy procedure, minimal or no abdominal distention or passing flatus. Yes      13. Written discharge instructions and emergency telephone number provided. Yes      14. Accompanied by a responsible adult.     Yes

## 2022-06-24 NOTE — ANESTHESIA PRE PROCEDURE
Department of Anesthesiology  Preprocedure Note       Name:  Connie Forrester   Age:  60 y.o.  :  1965                                          MRN:  [id number]         Date:  2022      Surgeon: Serenity Dukes):  Rosalinda Duncan DO    Procedure: Procedure(s):  EGD ESOPHAGOGASTRODUODENOSCOPY    Medications prior to admission:   Prior to Admission medications    Medication Sig Start Date End Date Taking? Authorizing Provider   ZOLMitriptan (ZOMIG) 5 MG tablet Take 1 tablet by mouth daily as needed for Migraine 22   WENDY Dang CNP   desvenlafaxine succinate (PRISTIQ) 100 MG TB24 extended release tablet Take 1 tablet by mouth daily Anxiety, post menopausal hot flashes 22   WENDY Dang CNP   hydroCHLOROthiazide (MICROZIDE) 12.5 MG capsule Take 1 capsule by mouth every morning hypertension 22   WENDY Dang CNP   vilazodone HCl (VIIBRYD) 20 MG TABS Take 1 tablet by mouth daily Depression 4/10/22   WENDY Dang CNP   Pseudoephedrine-DM-GG (CAPMIST DM) 60- MG TABS 1 capsule every 6 hours by mouth as needed for cough and congestion. Do not exceed 4 capsules per day. 3/30/22   WENDY Guthrie CNP   raloxifene (EVISTA) 60 MG tablet TAKE 1 TABLET DAILY 21   Mart Aguero MD   NONFORMULARY Indications: Calcium takes 1000 a day    Historical Provider, MD   NONFORMULARY Indications: Orthomega fish oil 1 capsule    Historical Provider, MD       Current medications:    Current Facility-Administered Medications   Medication Dose Route Frequency Provider Last Rate Last Admin    lactated ringers infusion   IntraVENous Continuous WENDY Cortes CRNA 100 mL/hr at 22 1233 New Bag at 22 1233       Allergies:     Allergies   Allergen Reactions    Penicillins Swelling     Swelling, rash    Boniva [Ibandronic Acid] Other (See Comments)     Severe joint pain       Problem List:    Patient Active Problem List   Diagnosis Code    Essential hypertension, benign I10    Classic migraine G43. 109    Dysthymic disorder F34.1       Past Medical History:        Diagnosis Date    ASCUS of cervix with negative high risk HPV     Encounter for screening colonoscopy 12/3/2013    Endometriosis     Hypertension     Migraine     Osteopenia        Past Surgical History:        Procedure Laterality Date    BACK SURGERY      COLONOSCOPY      in her 25s    COLONOSCOPY N/A 8/27/2019    COLONOSCOPY Prasanth Mayers MD =normal, repeat in 5-10 years (maternal hx polyps)    LAPAROSCOPY         Social History:    Social History     Tobacco Use    Smoking status: Never Smoker    Smokeless tobacco: Never Used   Substance Use Topics    Alcohol use: Yes     Alcohol/week: 3.0 standard drinks     Types: 3 Shots of liquor per week     Comment: occas. Counseling given: Not Answered      Vital Signs (Current):   Vitals:    06/10/22 1219 06/24/22 1209 06/24/22 1221   BP:   109/71   Pulse:   67   Resp:   12   Temp:   36.4 °C (97.6 °F)   TempSrc:   Temporal   SpO2:   99%   Weight: 113 lb (51.3 kg) 113 lb (51.3 kg)    Height: 5' 2\" (1.575 m) 5' 2.5\" (1.588 m)                                               BP Readings from Last 3 Encounters:   06/24/22 109/71   06/20/22 110/78   04/20/22 123/68       NPO Status: Time of last liquid consumption: 2100                        Time of last solid consumption: 2100                        Date of last liquid consumption: 06/23/22                        Date of last solid food consumption: 06/23/22    BMI:   Wt Readings from Last 3 Encounters:   06/24/22 113 lb (51.3 kg)   06/20/22 113 lb (51.3 kg)   04/20/22 116 lb (52.6 kg)     Body mass index is 20.34 kg/m².     CBC:   Lab Results   Component Value Date    WBC 6.4 12/02/2020    RBC 3.72 12/02/2020    HGB 12.1 12/02/2020    HCT 35.5 12/02/2020    MCV 95.4 12/02/2020    RDW 12.8 12/02/2020     12/02/2020       CMP:   Lab Results   Component Value Date     06/03/2021    K 4.1 06/03/2021    CL 98 06/03/2021    CO2 29 06/03/2021    BUN 9 06/03/2021    CREATININE 0.58 06/03/2021    GFRAA >60 06/03/2021    LABGLOM >60 06/03/2021    GLUCOSE 106 06/03/2021    PROT 7.4 06/03/2021    CALCIUM 9.2 06/03/2021    BILITOT 0.59 06/03/2021    ALKPHOS 63 06/03/2021    AST 20 06/03/2021    ALT 15 06/03/2021       POC Tests: No results for input(s): POCGLU, POCNA, POCK, POCCL, POCBUN, POCHEMO, POCHCT in the last 72 hours. Coags: No results found for: PROTIME, INR, APTT    HCG (If Applicable): No results found for: PREGTESTUR, PREGSERUM, HCG, HCGQUANT     ABGs: No results found for: PHART, PO2ART, UGX3ZBC, GXX0GWC, BEART, T2GXMLOT     Type & Screen (If Applicable):  No results found for: LABABO, LABRH    Drug/Infectious Status (If Applicable):  No results found for: HIV, HEPCAB    COVID-19 Screening (If Applicable): No results found for: COVID19        Anesthesia Evaluation  Patient summary reviewed and Nursing notes reviewed no history of anesthetic complications:   Airway: Mallampati: I  TM distance: >3 FB   Neck ROM: full  Mouth opening: > = 3 FB   Dental: normal exam         Pulmonary:Negative Pulmonary ROS and normal exam        (-) not a current smoker                           Cardiovascular:  Exercise tolerance: good (>4 METS),   (+) hypertension:,       ECG reviewed      Echocardiogram reviewed               ROS comment: Summary  Left ventricle is normal in size. Mild left ventricular hypertrophy. Global left ventricular systolic function is normal Estimated ejection  fraction is 60 % . Left atrium is normal in size. Right atrium is normal in size. Normal right ventricular size and function. Aortic valve leaflets are mildly thickened. No aortic stenosis. Mildly thickened mitral valve leaflets. Trivial mitral regurgitation.     In summary, she has normal LV size and function with no significant valvular  abnormality.  No functional abnormality to account for SOB. Neuro/Psych:   (+) headaches:,             GI/Hepatic/Renal: Neg GI/Hepatic/Renal ROS            Endo/Other: Negative Endo/Other ROS                    Abdominal:             Vascular: negative vascular ROS. Other Findings:           Anesthesia Plan      general and TIVA     ASA 2       Induction: intravenous. Anesthetic plan and risks discussed with patient and spouse. Plan discussed with CRNA.                     WENDY Bangura - BING   6/24/2022

## 2022-06-24 NOTE — OP NOTE
Operative Note        Patient: Lexii Murray  YOB: 1965  MRN: [id number]   Regine Lambert APRN - CNP      Date of Procedure: 6/24/2022    Pre-Op Diagnosis: bulimia nervosa, chronic. Post-Op Diagnosis: Same. No evidence of ulcers or esophagitis       Procedure(s):  EGD with Lucy test (antral biopsy)    Surgeon(s):  Fede Mlegoza DO    Assistant:  * No surgical staff found *    Anesthesia: Monitor Anesthesia Care    Estimated Blood Loss (mL): none    Complications: None    Specimens:   ID Type Source Tests Collected by Time Destination   1 :  Tissue Stomach H. PYLORI DETECTION Fede Melgoza DO 6/24/2022 1318      Please see H&P for indications. The patient was positioned appropriately and placed under monitored anesthesia. Protective bite block was placed. Time out called procedure confirmed. The lubricated gastroscope was carefully inserted into the oropharynx and advanced under direct vision into the esophagus, the stomach, through the pylorus, and into the 1st and second portions of the duodenum. The scope was withdrawn into the stomach. the scope was retroflexed in the stomach. Findings:    Esophagus: normal    GE junction: normal    Stomach: retroflexion: normal    Cardia, body, antrum: normal. Lucy test performed to check for H. Pylori (it later came back as negative the next day)    Pylorus: normal    Duodenum: bulb and sweep: normal      The scope was removed slowly without any new findings and the patient tolerated the procedure well. I spoke with family afterwards.       Electronically signed by Fede Melgoza DO, FACOS, FACS on 6/25/2022 at 4:15 PM

## 2022-06-24 NOTE — BRIEF OP NOTE
Brief Postoperative Note      Patient: Hilda Sarmiento  YOB: 1965  MRN: [id number]   Vika Howard, APRN - CNP      Date of Procedure: 6/24/2022    Pre-Op Diagnosis: bulimia nervosa, chronic. Post-Op Diagnosis: Same.  No evidence of ulcers or esophagitis       Procedure(s):  EGD with Lucy test (antral biopsy)    Surgeon(s):  Armen Box DO    Assistant:  * No surgical staff found *    Anesthesia: Monitor Anesthesia Care    Estimated Blood Loss (mL): none    Complications: None    Specimens:   ID Type Source Tests Collected by Time Destination   1 :  Tissue Stomach H. PYLORI DETECTION Armen Box DO 6/24/2022 1318        Electronically signed by Armen Box DO, FACOS, FACS on 6/25/2022 at 4:15 PM

## 2022-06-24 NOTE — ANESTHESIA POSTPROCEDURE EVALUATION
Department of Anesthesiology  Postprocedure Note    Patient: Shayan Wynne  MRN: [id number]  YOB: 1965  Date of evaluation: 6/24/2022      Procedure Summary     Date: 06/24/22 Room / Location: [address] [place] [address]    Anesthesia Start: 1311 Anesthesia Stop: 3024    Procedure: EGD BIOPSY (N/A Abdomen) Diagnosis:       Bulimia      (BULIMIA)    Surgeons: Della Rangel DO Responsible Provider: WENDY Barrera CRNA    Anesthesia Type: general, TIVA ASA Status: 2          Anesthesia Type: No value filed.     Juan Luis Phase I:      Juan Luis Phase II: Juan Luis Score: 10      Anesthesia Post Evaluation    Patient location during evaluation: PACU  Patient participation: complete - patient participated  Level of consciousness: awake and alert  Airway patency: patent  Nausea & Vomiting: no nausea and no vomiting  Complications: no  Cardiovascular status: blood pressure returned to baseline and hemodynamically stable  Respiratory status: acceptable and room air  Hydration status: euvolemic

## 2022-06-25 LAB
DIRECT EXAM: NEGATIVE
SPECIMEN DESCRIPTION: NORMAL

## 2022-06-27 ENCOUNTER — OFFICE VISIT (OUTPATIENT)
Dept: ENT CLINIC | Age: 57
End: 2022-06-27
Payer: COMMERCIAL

## 2022-06-27 VITALS
SYSTOLIC BLOOD PRESSURE: 120 MMHG | HEART RATE: 71 BPM | WEIGHT: 114 LBS | DIASTOLIC BLOOD PRESSURE: 86 MMHG | BODY MASS INDEX: 20.52 KG/M2 | RESPIRATION RATE: 18 BRPM | OXYGEN SATURATION: 97 %

## 2022-06-27 DIAGNOSIS — H60.8X3 CHRONIC ECZEMATOUS OTITIS EXTERNA OF BOTH EARS: ICD-10-CM

## 2022-06-27 DIAGNOSIS — H90.3 SENSORY HEARING LOSS, BILATERAL: Primary | ICD-10-CM

## 2022-06-27 PROCEDURE — G8427 DOCREV CUR MEDS BY ELIG CLIN: HCPCS | Performed by: OTOLARYNGOLOGY

## 2022-06-27 PROCEDURE — G8420 CALC BMI NORM PARAMETERS: HCPCS | Performed by: OTOLARYNGOLOGY

## 2022-06-27 PROCEDURE — 1036F TOBACCO NON-USER: CPT | Performed by: OTOLARYNGOLOGY

## 2022-06-27 PROCEDURE — 99203 OFFICE O/P NEW LOW 30 MIN: CPT | Performed by: OTOLARYNGOLOGY

## 2022-06-27 PROCEDURE — 3017F COLORECTAL CA SCREEN DOC REV: CPT | Performed by: OTOLARYNGOLOGY

## 2022-06-27 RX ORDER — FLUOCINOLONE ACETONIDE 0.11 MG/ML
4 OIL AURICULAR (OTIC) 2 TIMES DAILY
Qty: 20 ML | Refills: 3 | Status: SHIPPED | OUTPATIENT
Start: 2022-06-27 | End: 2022-07-27

## 2022-06-27 ASSESSMENT — ENCOUNTER SYMPTOMS
SINUS PAIN: 0
FACIAL SWELLING: 0
ALLERGIC/IMMUNOLOGIC NEGATIVE: 1
SORE THROAT: 0
GASTROINTESTINAL NEGATIVE: 1
VOICE CHANGE: 0
RESPIRATORY NEGATIVE: 1
EYES NEGATIVE: 1
RHINORRHEA: 0
TROUBLE SWALLOWING: 0
SINUS PRESSURE: 0

## 2022-06-27 NOTE — PATIENT INSTRUCTIONS
SURVEY:    You may be receiving a survey from [employer] regarding your visit today. Please complete the survey to enable us to provide the highest quality of care to you and your family. If you cannot score us a very good on any question, please call the office to discuss how we could have made your experience a very good one. Thank you.   Dick 425MD Shayna Banks MD Mellissa Mane, MD Elige Carder, MD Dread Valente, [facility]  Cathi Rosas, [place], [place]  ANDRE Beckford, [place]  Letty Peter, [place]  Mee Berrios, [place]

## 2022-06-27 NOTE — PROGRESS NOTES
[facility]  [address] [facility] [address]  Dept: MD Yenifer Mendozaclarita Cherryrj 60 y.o. female     Patient presents with a chief complaint of Hearing Problem (bilateral but moreso the right ear. Denies ringing. )       /86   Pulse 71   Resp 18   Wt 114 lb (51.7 kg)   LMP 09/29/2015 (Approximate)   SpO2 97%   BMI 20.52 kg/m²       History of Presenting Illness: The patient/caregiver reports a history of complaint with the following features: Onset: started many years ago  Timing: worsening  Duration: many years  Quality: right ear feels plugged, itch,  trouble hearing speech, worse in background  Location: right ear  Severity: pain none  Risk factors: family with hearing loss  Alleviating factors: nothing gives relief  Aggravating factors: nothing makes it worse  Associated factors: no vertigo, no tinnitus    Review of systems covering 10 systems is reviewed as staff entry in other note and pertinent positives and negatives noted.     Past Medical History:   Diagnosis Date    ASCUS of cervix with negative high risk HPV     C. difficile colitis     around 2020    Encounter for screening colonoscopy 12/03/2013    Endometriosis     Hypertension     Migraine     Osteopenia        Current Outpatient Medications:     fluocinolone (DERMOTIC) 0.01 % OIL oil, Place 4 drops in ear(s) 2 times daily, Disp: 20 mL, Rfl: 3    ZOLMitriptan (ZOMIG) 5 MG tablet, Take 1 tablet by mouth daily as needed for Migraine, Disp: 15 tablet, Rfl: 1    desvenlafaxine succinate (PRISTIQ) 100 MG TB24 extended release tablet, Take 1 tablet by mouth daily Anxiety, post menopausal hot flashes, Disp: 90 tablet, Rfl: 1    hydroCHLOROthiazide (MICROZIDE) 12.5 MG capsule, Take 1 capsule by mouth every morning hypertension, Disp: 30 capsule, Rfl: 0    vilazodone HCl (VIIBRYD) 20 MG TABS, Take 1 tablet by mouth daily Depression, Disp: 90 tablet, Rfl: 1    Pseudoephedrine-DM-GG (CAPMIST DM) 60- MG TABS, 1 capsule every 6 hours by mouth as needed for cough and congestion. Do not exceed 4 capsules per day., Disp: 28 tablet, Rfl: 0    raloxifene (EVISTA) 60 MG tablet, TAKE 1 TABLET DAILY, Disp: 90 tablet, Rfl: 4    NONFORMULARY, Indications: Calcium takes 1000 a day, Disp: , Rfl:     NONFORMULARY, Indications: Orthomega fish oil 1 capsule, Disp: , Rfl:    Allergies   Allergen Reactions    Penicillins Swelling     Swelling, rash    Boniva [Ibandronic Acid] Other (See Comments)     Severe joint pain      Past Surgical History:   Procedure Laterality Date    BACK SURGERY      COLONOSCOPY      in her 25s    COLONOSCOPY N/A 8/27/2019    COLONOSCOPY Neha Cross MD =normal, repeat in 5-10 years (maternal hx polyps)    ESOPHAGOGASTRODUODENOSCOPY  06/24/2022    cesar test    LAPAROSCOPY      UPPER GASTROINTESTINAL ENDOSCOPY N/A 6/24/2022    EGD BIOPSY performed by Lois Jaffe DO at [address] [place] [address] History     Socioeconomic History    Marital status:      Spouse name: Not on file    Number of children: Not on file    Years of education: Not on file    Highest education level: Not on file   Occupational History    Not on file   Tobacco Use    Smoking status: Never Smoker    Smokeless tobacco: Never Used   Vaping Use    Vaping Use: Never used   Substance and Sexual Activity    Alcohol use: Yes     Alcohol/week: 3.0 standard drinks     Types: 3 Shots of liquor per week     Comment: occas.     Drug use: No    Sexual activity: Yes     Partners: Male     Birth control/protection: Post-menopausal   Other Topics Concern    Not on file   Social History Narrative    Not on file     Social Determinants of Health     Financial Resource Strain: Low Risk     Difficulty of Paying Living Expenses: Not hard at all   Food Insecurity: No Food Insecurity    Worried About [facility] in the Last Year: Never true   [address] in the Last Year: Never true   Transportation Needs:     Lack of Transportation (Medical): Not on file    Lack of Transportation (Non-Medical):  Not on file   Physical Activity:     Days of Exercise per Week: Not on file    Minutes of Exercise per Session: Not on file   Stress:     Feeling of Stress : Not on file   Social Connections:     Frequency of Communication with Friends and Family: Not on file    Frequency of Social Gatherings with Friends and Family: Not on file    Attends Mu-ism Services: Not on file    Active Member of Clubs or Organizations: Not on file    Attends Club or Organization Meetings: Not on file    Marital Status: Not on file   Intimate Partner Violence:     Fear of Current or Ex-Partner: Not on file    Emotionally Abused: Not on file    Physically Abused: Not on file    Sexually Abused: Not on file   Housing Stability:     Unable to Pay for Housing in the Last Year: Not on file    Number of [place] in the Last Year: Not on file    Unstable Housing in the Last Year: Not on file     Family History   Problem Relation Age of Onset    Cancer Father         Non-Hodgkins Lymphoma    Diabetes Mother     Hypertension Mother     Cancer Mother         Liver Cancer    Diabetes Brother     No Known Problems Sister         PHYSICAL EXAM:    The patient was examined today 6/27/2022 with findings as follows:    CONSTITUTIONAL:    General Appearance: well-appearing, nontoxic, alert, no acute distress     Communication: understanding at normal conversational tones, normal voicing, speech intelligible    HEAD/FACE:    Head: atraumatic, normocephalic, no lesions    Facial Inspection: no lesions, healthy skin    Facial Strength: motor strength normal, symmetric strength, symmetric movement, motor strength    Sinuses: no sinus tenderness    Salivary Glands: no enlargements of parotid glands, no tenderness of parotid glands, no masses of parotid glands, clear salivary flow on palpation from Stensen's ducts, no duct stones of Stensen's duct, no enlargement of submandibular glands, no tenderness of submandibular glands, no masses of submandibular glands, clear salivary flow from Arenac's ducts, no stones of Arenac's ducts    Temporomandibular Joint: no crepitus with motion, no tenderness on palpation, no trismus, motion symmetric    EYES:    Pupils: PERRLA, extra-ocular movements intact, no nystagmus, sclera white, no redness of eyes, no watering of eyes    EARS:    Bilateral External Ears: no pits, no tags    Right External Ear: normally formed, no lesions, no mastoid tenderness    Left External Ear: normally formed, no lesions, no mastoid tenderness    Right External Auditory Canal: dry flaky skin, obstructing cerumen, no discharge    Left External Auditory Canal:  dry flaky skin, obstructing cerumen, no discharge    Right Tympanic Membrane: normal landmarks, translucent, mobile to pneumatic otoscopy, no perforation    Left Tympanic Membrane: normal landmarks, translucent, mobile to pneumatic otoscopy, no perforation    Hearing: intact to spoken voice, intact to finger rub, Cruz midline, Right Ear: Rinne AC>BC, Left Left Ear: Rinne AC>BC    NOSE:    Nasal Skin: no lesions, no lacerations, no scars    Nasal Dorsum: symmetric with no visible or palpable deformities    Nasal Tip: normal symmetric nasal tip, normal nasal valves    Nasal Mucosa: normal, pink and moist    Septum: not markedly deformed, midline, no exposed vessels, no bleeding, no septal granuloma    Turbinates: normal size and conformation    Nasopharynx: normal    ORAL CAVITY/MOUTH:    Lips, teeth, gums: normal lips, normal gums, dentition intact, no dental pain on palpation    Oral Mucosa: normal, moist, no lesions    Palate: normal hard palate, normal soft palate, symmetric palatal elevation    Floor of Mouth: normal floor of mouth    Tongue: normal tongue, no lesions, no edema, no masses, normal mucosa, mobile    Tonsils: normal tonsils, symmetric, no lesions    Posterior pharynx: normal    NECK:    Neck: no masses, trachea midline, normal range of motion, no cysts or pits, no tenderness to palpation    Thyroid: normal thyroid, no enlargement, no tenderness, no nodules    LYMPH NODES:    Cervical: no palpable lymph node enlargement    SKIN:    General Appearance: no lesions, warm and dry, normal turgor, no bruising    NEUROLOGICAL SYSTEM:    Orientation: oriented to time, oriented to place, oriented to person    Cranial Nerves: Cranial Nerves II-XII intact, normal facial movement    PSYCHIATRIC:    Mood and affect: normal mood, normal affect          Assessment and Plan:    Audiometric testing reveals a very mild cookie-bite pattern hearing loss, but I would not advocated for amplification given its mild degree as speech discrimination is excellent at normal speech volumes. She does have eczema of the external ears leading to itching and debris accumulation. The patient and/or caregiver is advised on the use of any prescribed medication. The avoidance of water exposure to the ear and mechanical trauma such as the use of cotton tipped swabs or the insertion of objects into the ear is advised. The patient and/or caregiver is to notify the office if no improvement or worsening of symptoms is noted prior to the scheduled follow-up for sooner evaluation. The patient and/or caregiver is able to state an understanding of these recommendations and is agreeable to the treatment plan. Diagnosis Orders   1. Sensory hearing loss, bilateral     2. Chronic eczematous otitis externa of both ears  fluocinolone (DERMOTIC) 0.01 % OIL oil      Return if symptoms worsen or fail to improve. The patient and/or caregiver is to notify the office if no improvement or worsening of symptoms is noted prior to the scheduled follow-up for sooner evaluation.  The patient and/or caregiver is able to state an understanding of these recommendations and is agreeable to the treatment plan. --Adrienne Fischer MD on 6/27/2022 at 10:58 AM    An electronic signature was used to authenticate this note.

## 2022-07-18 ENCOUNTER — PATIENT MESSAGE (OUTPATIENT)
Dept: FAMILY MEDICINE CLINIC | Age: 57
End: 2022-07-18

## 2022-07-18 NOTE — TELEPHONE ENCOUNTER
From: Karlee Vasquez  To: Ameena Duncan  Sent: 7/18/2022 11:04 AM EDT  Subject: Blood pressure    Hi Jorden Aranda, I have been checking my blood pressure and today it was 133/85. It has been higher than normal just taking the lower dose of the hydro. I only have about 5 left of the lower dose, and then I have a full bottle of the higher dose. Please let me know what you would like to to do.
Low

## 2022-07-25 RX ORDER — LISINOPRIL 5 MG/1
5 TABLET ORAL DAILY
Qty: 30 TABLET | Refills: 1 | Status: SHIPPED | OUTPATIENT
Start: 2022-07-25 | End: 2022-08-15 | Stop reason: SDUPTHER

## 2022-08-15 RX ORDER — LISINOPRIL 5 MG/1
5 TABLET ORAL DAILY
Qty: 30 TABLET | Refills: 1 | Status: SHIPPED | OUTPATIENT
Start: 2022-08-15 | End: 2022-10-17 | Stop reason: SDUPTHER

## 2022-08-15 RX ORDER — VILAZODONE HYDROCHLORIDE 20 MG/1
20 TABLET ORAL DAILY
Qty: 90 TABLET | Refills: 1 | Status: SHIPPED | OUTPATIENT
Start: 2022-08-15 | End: 2022-09-06 | Stop reason: SDUPTHER

## 2022-09-07 RX ORDER — VILAZODONE HYDROCHLORIDE 20 MG/1
20 TABLET ORAL DAILY
Qty: 90 TABLET | Refills: 1 | Status: SHIPPED | OUTPATIENT
Start: 2022-09-07

## 2022-10-17 ENCOUNTER — OFFICE VISIT (OUTPATIENT)
Dept: FAMILY MEDICINE CLINIC | Age: 57
End: 2022-10-17
Payer: COMMERCIAL

## 2022-10-17 VITALS
DIASTOLIC BLOOD PRESSURE: 78 MMHG | WEIGHT: 117 LBS | BODY MASS INDEX: 20.73 KG/M2 | OXYGEN SATURATION: 96 % | SYSTOLIC BLOOD PRESSURE: 108 MMHG | HEART RATE: 70 BPM | HEIGHT: 63 IN

## 2022-10-17 DIAGNOSIS — Z00.00 ENCOUNTER FOR WELL ADULT EXAM WITHOUT ABNORMAL FINDINGS: Primary | ICD-10-CM

## 2022-10-17 DIAGNOSIS — Z13.820 SPECIAL SCREENING FOR OSTEOPOROSIS: ICD-10-CM

## 2022-10-17 DIAGNOSIS — Z13.820 OSTEOPOROSIS SCREENING: ICD-10-CM

## 2022-10-17 DIAGNOSIS — F32.A ANXIETY AND DEPRESSION: ICD-10-CM

## 2022-10-17 DIAGNOSIS — G43.101 MIGRAINE WITH AURA AND WITH STATUS MIGRAINOSUS, NOT INTRACTABLE: ICD-10-CM

## 2022-10-17 DIAGNOSIS — F41.9 ANXIETY AND DEPRESSION: ICD-10-CM

## 2022-10-17 DIAGNOSIS — M85.80 OSTEOPENIA, UNSPECIFIED LOCATION: ICD-10-CM

## 2022-10-17 DIAGNOSIS — I10 ESSENTIAL HYPERTENSION, BENIGN: ICD-10-CM

## 2022-10-17 DIAGNOSIS — Z12.31 SCREENING MAMMOGRAM, ENCOUNTER FOR: ICD-10-CM

## 2022-10-17 DIAGNOSIS — E55.9 VITAMIN D DEFICIENCY: ICD-10-CM

## 2022-10-17 DIAGNOSIS — Z78.0 POSTMENOPAUSAL ESTROGEN DEFICIENCY: ICD-10-CM

## 2022-10-17 PROCEDURE — G8484 FLU IMMUNIZE NO ADMIN: HCPCS | Performed by: NURSE PRACTITIONER

## 2022-10-17 PROCEDURE — 99396 PREV VISIT EST AGE 40-64: CPT | Performed by: NURSE PRACTITIONER

## 2022-10-17 RX ORDER — LISINOPRIL 5 MG/1
5 TABLET ORAL DAILY
Qty: 90 TABLET | Refills: 1 | Status: SHIPPED | OUTPATIENT
Start: 2022-10-17

## 2022-10-17 NOTE — PROGRESS NOTES
Well Adult Note  Name: Madeline Adams Date: 10/18/2022   MRN: [id number] Sex: Female   Age: 59 y.o. Ethnicity: Non- / Non    : 1965 Race: White (non-)      Sparkle Maldonado is here for well adult exam.  History:      59year old female with hypertension, hx migraine headaches, anxiety, hx back pain with lumbar surgery, and bulemia   Bulemia- would if someone close? Full time job at school and 2 part time. Very busy. Anxiety viibryd, prostiq. BP Readings from Last 3 Encounters:   10/17/22 108/78   22 120/86   22 116/78     On evista for osteopenia due for dexa    Denies any skin lesions. Review of Systems   Constitutional:  Negative for activity change, appetite change, chills and fever. HENT:  Negative for ear pain and sore throat. Eyes: Negative. Respiratory:  Negative for cough, chest tightness, shortness of breath and wheezing. Cardiovascular:  Negative for chest pain, palpitations and leg swelling. Genitourinary:  Negative for difficulty urinating. Musculoskeletal:  Negative for arthralgias and joint swelling. Skin: Negative. Negative for rash. Neurological:  Negative for dizziness and headaches. Psychiatric/Behavioral:  Negative for behavioral problems and sleep disturbance. Allergies   Allergen Reactions    Penicillins Swelling     Swelling, rash    Boniva [Ibandronic Acid] Other (See Comments)     Severe joint pain         Prior to Visit Medications    Medication Sig Taking?  Authorizing Provider   lisinopril (PRINIVIL;ZESTRIL) 5 MG tablet Take 1 tablet by mouth daily For hypertension Yes Avi Potter, APRN - CNP   vilazodone HCl (VIIBRYD) 20 MG TABS Take 1 tablet by mouth daily Depression Yes Avi Tariq, APRN - CNP   ZOLMitriptan (ZOMIG) 5 MG tablet Take 1 tablet by mouth daily as needed for Migraine Yes Avi Potter, APRN - CNP   desvenlafaxine succinate (PRISTIQ) 100 MG TB24 extended release tablet Take 1 tablet by mouth daily Anxiety, post menopausal hot flashes Yes Leydi Gramajo, APRN - CNP   raloxifene (EVISTA) 60 MG tablet TAKE 1 TABLET DAILY Yes Lorie Deras MD   NONFORMULARY Indications: Calcium takes 1000 a day Yes Historical Provider, MD   NONFORMULARY Indications: Orthomega fish oil 1 capsule Yes Historical Provider, MD         Past Medical History:   Diagnosis Date    ASCUS of cervix with negative high risk HPV     C. difficile colitis     around 2020    Encounter for screening colonoscopy 12/03/2013    Endometriosis     Hypertension     Migraine     Osteopenia        Past Surgical History:   Procedure Laterality Date    BACK SURGERY      COLONOSCOPY      in her 25s    COLONOSCOPY N/A 8/27/2019    COLONOSCOPY Marielle Joseph MD =normal, repeat in 5-10 years (maternal hx polyps)    ESOPHAGOGASTRODUODENOSCOPY  06/24/2022    cesar test    LAPAROSCOPY      UPPER GASTROINTESTINAL ENDOSCOPY N/A 6/24/2022    EGD BIOPSY performed by Edward Estevez DO at [address] Medical History   Problem Relation Age of Onset    Cancer Father         Non-Hodgkins Lymphoma    Diabetes Mother     Hypertension Mother     Cancer Mother         Liver Cancer    Diabetes Brother     No Known Problems Sister        Social History     Tobacco Use    Smoking status: Never    Smokeless tobacco: Never   Vaping Use    Vaping Use: Never used   Substance Use Topics    Alcohol use: Yes     Alcohol/week: 3.0 standard drinks     Types: 3 Shots of liquor per week     Comment: occas. Drug use: No       Objective   /78 (Site: Right Upper Arm, Position: Sitting)   Pulse 70   Ht 5' 2.5\" (1.588 m)   Wt 117 lb (53.1 kg)   LMP 09/29/2015 (Approximate)   SpO2 96%   BMI 21.06 kg/m²   Wt Readings from Last 3 Encounters:   10/17/22 117 lb (53.1 kg)   06/27/22 114 lb (51.7 kg)   06/24/22 113 lb (51.3 kg)     There were no vitals filed for this visit. Physical Exam  Vitals and nursing note reviewed.    Constitutional:       General: She is not in acute distress. Appearance: Normal appearance. She is well-developed. HENT:      Head: Normocephalic and atraumatic. Right Ear: Tympanic membrane and external ear normal.      Left Ear: Tympanic membrane and external ear normal.      Nose: No congestion. Mouth/Throat:      Mouth: Mucous membranes are moist.      Pharynx: No oropharyngeal exudate. Eyes:      General: No scleral icterus. Pupils: Pupils are equal, round, and reactive to light. Neck:      Vascular: No carotid bruit (neg mina). Cardiovascular:      Rate and Rhythm: Normal rate and regular rhythm. Heart sounds: Normal heart sounds. No murmur heard. Pulmonary:      Effort: Pulmonary effort is normal. No respiratory distress. Breath sounds: Normal breath sounds. No wheezing. Abdominal:      Palpations: Abdomen is soft. Tenderness: There is no abdominal tenderness. Musculoskeletal:         General: Normal range of motion. Cervical back: Normal range of motion and neck supple. Lymphadenopathy:      Cervical: No cervical adenopathy. Skin:     General: Skin is warm and dry. Findings: No rash. Neurological:      Mental Status: She is alert and oriented to person, place, and time. Psychiatric:         Behavior: Behavior normal.         Thought Content: Thought content normal.         Judgment: Judgment normal.         Assessment   Plan   1. Encounter for well adult exam without abnormal findings  2. Anxiety and depression  3. Essential hypertension, benign  4. Migraine with aura and with status migrainosus, not intractable  5. Vitamin D deficiency  6. Osteopenia, unspecified location  -     DEXA BONE DENSITY 2 SITES; Future  7. Screening mammogram, encounter for  -     [facility] ISAAK DIGITAL SCREEN BILATERAL; Future  8. Osteoporosis screening  -     DEXA BONE DENSITY 2 SITES;  Future         Personalized Preventive Plan   Current Health Maintenance Status  Immunization History   Administered Date(s) Administered    COVID-19, PFIZER PURPLE top, DILUTE for use, (age 15 y+), 30mcg/0.3mL 09/01/2021, 09/22/2021        Health Maintenance   Topic Date Due    HIV screen  Never done    Hepatitis C screen  Never done    DTaP/Tdap/Td vaccine (1 - Tdap) Never done    Shingles vaccine (1 of 2) Never done    COVID-19 Vaccine (3 - Booster for Pfizer series) 02/22/2022    Flu vaccine (1) Never done    Depression Monitoring  03/02/2023    Breast cancer screen  11/17/2023    Cervical cancer screen  11/09/2024    Lipids  06/03/2026    Colorectal Cancer Screen  08/27/2029    Hepatitis A vaccine  Aged Out    Hib vaccine  Aged Out    Meningococcal (ACWY) vaccine  Aged Out    Pneumococcal 0-64 years Vaccine  Aged Out     Recommendations for [employer] Due: see orders and patient instructions/AVS.    Return in about 6 months (around 4/17/2023) for HTN check.

## 2022-10-18 ASSESSMENT — ENCOUNTER SYMPTOMS
CHEST TIGHTNESS: 0
SORE THROAT: 0
SHORTNESS OF BREATH: 0
EYES NEGATIVE: 1
COUGH: 0
WHEEZING: 0

## 2022-11-18 ENCOUNTER — HOSPITAL ENCOUNTER (OUTPATIENT)
Dept: BONE DENSITY | Age: 57
Discharge: HOME | End: 2022-11-20
Payer: COMMERCIAL

## 2022-11-18 ENCOUNTER — HOSPITAL ENCOUNTER (OUTPATIENT)
Dept: MAMMOGRAPHY | Age: 57
Discharge: HOME | End: 2022-11-20
Payer: COMMERCIAL

## 2022-11-18 DIAGNOSIS — M85.80 OSTEOPENIA, UNSPECIFIED LOCATION: ICD-10-CM

## 2022-11-18 DIAGNOSIS — Z12.31 SCREENING MAMMOGRAM, ENCOUNTER FOR: ICD-10-CM

## 2022-11-18 DIAGNOSIS — Z13.820 OSTEOPOROSIS SCREENING: ICD-10-CM

## 2022-11-18 DIAGNOSIS — Z13.820 SPECIAL SCREENING FOR OSTEOPOROSIS: ICD-10-CM

## 2022-11-18 DIAGNOSIS — Z78.0 POSTMENOPAUSAL ESTROGEN DEFICIENCY: ICD-10-CM

## 2022-11-18 PROCEDURE — 77080 DXA BONE DENSITY AXIAL: CPT

## 2022-11-18 PROCEDURE — 77063 BREAST TOMOSYNTHESIS BI: CPT

## 2022-12-06 ENCOUNTER — OFFICE VISIT (OUTPATIENT)
Dept: OBGYN CLINIC | Age: 57
End: 2022-12-06
Payer: COMMERCIAL

## 2022-12-06 ENCOUNTER — HOSPITAL ENCOUNTER (OUTPATIENT)
Age: 57
Setting detail: SPECIMEN
Discharge: HOME | End: 2022-12-06
Payer: COMMERCIAL

## 2022-12-06 VITALS
RESPIRATION RATE: 18 BRPM | BODY MASS INDEX: 20.88 KG/M2 | OXYGEN SATURATION: 98 % | SYSTOLIC BLOOD PRESSURE: 111 MMHG | HEART RATE: 88 BPM | DIASTOLIC BLOOD PRESSURE: 78 MMHG | WEIGHT: 116 LBS

## 2022-12-06 DIAGNOSIS — Z01.419 WOMEN'S ANNUAL ROUTINE GYNECOLOGICAL EXAMINATION: ICD-10-CM

## 2022-12-06 DIAGNOSIS — Z01.419 WOMEN'S ANNUAL ROUTINE GYNECOLOGICAL EXAMINATION: Primary | ICD-10-CM

## 2022-12-06 DIAGNOSIS — M85.80 OSTEOPENIA DETERMINED BY X-RAY: ICD-10-CM

## 2022-12-06 PROCEDURE — 3078F DIAST BP <80 MM HG: CPT | Performed by: OBSTETRICS & GYNECOLOGY

## 2022-12-06 PROCEDURE — 99396 PREV VISIT EST AGE 40-64: CPT | Performed by: OBSTETRICS & GYNECOLOGY

## 2022-12-06 PROCEDURE — 3074F SYST BP LT 130 MM HG: CPT | Performed by: OBSTETRICS & GYNECOLOGY

## 2022-12-06 PROCEDURE — G8484 FLU IMMUNIZE NO ADMIN: HCPCS | Performed by: OBSTETRICS & GYNECOLOGY

## 2022-12-06 PROCEDURE — G0145 SCR C/V CYTO,THINLAYER,RESCR: HCPCS

## 2022-12-06 RX ORDER — PREDNISOLONE ACETATE 10 MG/ML
SUSPENSION/ DROPS OPHTHALMIC
COMMUNITY
Start: 2022-12-01

## 2022-12-06 RX ORDER — RALOXIFENE HYDROCHLORIDE 60 MG/1
TABLET, FILM COATED ORAL
Qty: 90 TABLET | Refills: 4 | Status: SHIPPED | OUTPATIENT
Start: 2022-12-06

## 2022-12-06 NOTE — PATIENT INSTRUCTIONS
SURVEY:    You may be receiving a survey from [employer] regarding your visit today. Please complete the survey to enable us to provide the highest quality of care to you and your family. If you cannot score us a very good on any question, please call the office to discuss how we could have made your experience a very good one. Thank you.   MD Uzma Olvera MD Claybon Neat, MD Loise Madeira, DO  Cathi Rosas, PM  ANDRE [place], [facility]

## 2022-12-06 NOTE — PROGRESS NOTES
YEARLY PHYSICAL    Date of service: 2022    Katarina Gonzalez  Is a 59 y.o.  , female    PT's PCP is: WENDY Arreola CNP     : 1965                                             Subjective:       Patient's last menstrual period was 2015 (approximate). Are your menses regular: no    OB History    Para Term  AB Living   3 2           SAB IAB Ectopic Molar Multiple Live Births                    # Outcome Date GA Lbr Iam/2nd Weight Sex Delivery Anes PTL Lv   3             2 Para            1 Para                 Social History     Tobacco Use   Smoking Status Never   Smokeless Tobacco Never        Social History     Substance and Sexual Activity   Alcohol Use Yes    Alcohol/week: 3.0 standard drinks    Types: 3 Shots of liquor per week    Comment: occas.        Family History   Problem Relation Age of Onset    Cancer Father         Non-Hodgkins Lymphoma    Diabetes Mother     Hypertension Mother     Cancer Mother         Liver Cancer    Diabetes Brother     No Known Problems Sister        Any family history of breast or ovarian cancer: Yes maternal aunt- breast    Any family history of blood clots: No      Allergies: Penicillins and Boniva [ibandronic acid]      Current Outpatient Medications:     raloxifene (EVISTA) 60 MG tablet, TAKE 1 TABLET DAILY, Disp: 90 tablet, Rfl: 4    lisinopril (PRINIVIL;ZESTRIL) 5 MG tablet, Take 1 tablet by mouth daily For hypertension, Disp: 90 tablet, Rfl: 1    vilazodone HCl (VIIBRYD) 20 MG TABS, Take 1 tablet by mouth daily Depression, Disp: 90 tablet, Rfl: 1    ZOLMitriptan (ZOMIG) 5 MG tablet, Take 1 tablet by mouth daily as needed for Migraine, Disp: 15 tablet, Rfl: 1    desvenlafaxine succinate (PRISTIQ) 100 MG TB24 extended release tablet, Take 1 tablet by mouth daily Anxiety, post menopausal hot flashes, Disp: 90 tablet, Rfl: 1    NONFORMULARY, Indications: Calcium takes 1000 a day, Disp: , Rfl:     NONFORMULARY, Indications: Orthomega fish oil 1 capsule, Disp: , Rfl:     prednisoLONE acetate (PRED FORTE) 1 % ophthalmic suspension, INSTILL 1 DROP IN THE LEFT EYE FOUR TIMES DAILY FOR 1 WEEK, then INSTILL 1 DROP TWICE DAILY FOR 1 WEEK, then INSTILL 1 DROP EVERY DAY FOR 1 WEEK - - shake well before using, Disp: , Rfl:     Social History     Substance and Sexual Activity   Sexual Activity Yes    Partners: Male    Birth control/protection: Post-menopausal       Any bleeding or pain with intercourse: No    Last Yearly date:  11/9/21    Last pap date and results: 11/9/21    Last HPV date and results: n/a    Last Mammogram date and results: 11/22/22    Last Dexa scan date and results: n/a        Do you do self breast exams: Yes    Past Medical History:   Diagnosis Date    ASCUS of cervix with negative high risk HPV     C. difficile colitis     around 2020    Encounter for screening colonoscopy 12/03/2013    Endometriosis     Hypertension     Migraine     Osteopenia        Past Surgical History:   Procedure Laterality Date    BACK SURGERY      COLONOSCOPY      in her 25s    COLONOSCOPY N/A 8/27/2019    COLONOSCOPY Radha Soto MD =normal, repeat in 5-10 years (maternal hx polyps)    ESOPHAGOGASTRODUODENOSCOPY  06/24/2022    cesar test    LAPAROSCOPY      UPPER GASTROINTESTINAL ENDOSCOPY N/A 6/24/2022    EGD BIOPSY performed by Mono Medeiros DO at [facility] History   Problem Relation Age of Onset    Cancer Father         Non-Hodgkins Lymphoma    Diabetes Mother     Hypertension Mother     Cancer Mother         Liver Cancer    Diabetes Brother     No Known Problems Sister        Chief Complaint   Patient presents with    Gynecologic Exam     Patient in office for Gyn Exam, no concerns noted. PE:  Vital Signs  Blood pressure 111/78, pulse 88, resp. rate 18, weight 116 lb (52.6 kg), last menstrual period 09/29/2015, SpO2 98 %, currently breastfeeding. Estimated body mass index is 20.88 kg/m² as calculated from the following:    Height as of 10/17/22: 5' 2.5\" (1.588 m). Weight as of this encounter: 116 lb (52.6 kg). Labs:    No results found for this visit on 12/06/22. No data recorded    NURSE: ROBERT  PE:  Vital Signs  Blood pressure 111/78, pulse 88, resp. rate 18, weight 116 lb (52.6 kg), last menstrual period 09/29/2015, SpO2 98 %, currently breastfeeding. Labs:    No results found for this visit on 12/06/22. HPI: The patient is here today for a yearly exam.  She denies having any problems or concerns at this time she does need a refill of Evista for osteopenia    YesPT denies fever, chills, nausea and vomiting       Objective  Lymphatic:   no lymphadenopathy  Heent:   negative   Cor: regular rate and rhythm, no murmurs              Pul:clear to auscultation bilaterally- no wheezes, rales or rhonchi, normal air movement, no respiratory distress      GI: Abdomen soft, non-tender. BS normal. No masses,  No organomegaly           Extremities: normal strength, tone, and muscle mass   Breasts: Breast:normal appearance, no masses or tenderness, Inspection negative, No nipple retraction or dimpling, No nipple discharge or bleeding, No axillary or supraclavicular adenopathy, Normal to palpation without dominant masses   Pelvic Exam: GENITAL/URINARY:  External Genitalia:  General appearance; normal, Hair distribution; normal, Lesions absent  Vagina:  General appearance normal, Discharge absent, Lesions absent, Pelvic support normal  Cervix:  General appearance normal, Lesions absent, Discharge absent, Tenderness absent, Enlargement absent, Nodularity absent  Uterus:  Size normal, Contour normal, Position normal, Masses absent, Consistency; normal, Support normal, Tenderness absent  Adenexa:   Masses absent, Tenderness absent, Enlargement absent, Nodularity absent                                      Vaginal discharge: no vaginal discharge      Uterus: normal size, anteverted, mobile, non-tender, normal shape and consistency     Ovaries: Nonenlarged nontender           Over 50% of time spent on counseling and care coordination on: see assessment and plan                        Assessment and Plan: I did ensure patient is taking calcium and vitamin D and she is doing so. Diagnosis Orders   1. Women's annual routine gynecological examination  PAP Smear      2. Osteopenia determined by x-ray  raloxifene (EVISTA) 60 MG tablet          I am having Gladis Cardenas maintain her NONFORMULARY, NONFORMULARY, ZOLMitriptan, desvenlafaxine succinate, vilazodone HCl, lisinopril, prednisoLONE acetate, and raloxifene.

## 2022-12-14 ENCOUNTER — PATIENT MESSAGE (OUTPATIENT)
Dept: FAMILY MEDICINE CLINIC | Age: 57
End: 2022-12-14

## 2022-12-14 DIAGNOSIS — J06.9 VIRAL URI: Primary | ICD-10-CM

## 2022-12-14 NOTE — TELEPHONE ENCOUNTER
From: Juany More  To: Derrick Boss  Sent: 12/14/2022 9:29 AM EST  Subject: Current illness    Quevedo Nasuti, I am home with the flu, or bronchitis. Coughing, sore throat, congested, terrible headache, etc. Is it necessary for me to come in or should I just stick with over the counter stuff?

## 2023-01-25 DIAGNOSIS — F41.9 ANXIETY AND DEPRESSION: ICD-10-CM

## 2023-01-25 DIAGNOSIS — F32.A ANXIETY AND DEPRESSION: ICD-10-CM

## 2023-01-25 RX ORDER — DESVENLAFAXINE 100 MG/1
TABLET, EXTENDED RELEASE ORAL
Qty: 90 TABLET | Refills: 3 | Status: SHIPPED | OUTPATIENT
Start: 2023-01-25

## 2023-02-28 DIAGNOSIS — M85.80 OSTEOPENIA DETERMINED BY X-RAY: ICD-10-CM

## 2023-02-28 RX ORDER — RALOXIFENE HYDROCHLORIDE 60 MG/1
TABLET, FILM COATED ORAL
Qty: 90 TABLET | Refills: 4 | Status: SHIPPED | OUTPATIENT
Start: 2023-02-28

## 2023-04-13 ENCOUNTER — PATIENT MESSAGE (OUTPATIENT)
Dept: FAMILY MEDICINE CLINIC | Age: 58
End: 2023-04-13

## 2023-05-08 RX ORDER — ARIPIPRAZOLE 2 MG/1
2 TABLET ORAL DAILY
Qty: 30 TABLET | Refills: 1 | Status: SHIPPED | OUTPATIENT
Start: 2023-05-08

## 2023-09-08 ENCOUNTER — OFFICE VISIT (OUTPATIENT)
Dept: PRIMARY CARE CLINIC | Age: 58
End: 2023-09-08

## 2023-09-08 VITALS
OXYGEN SATURATION: 98 % | DIASTOLIC BLOOD PRESSURE: 86 MMHG | HEART RATE: 65 BPM | BODY MASS INDEX: 21.24 KG/M2 | WEIGHT: 118 LBS | SYSTOLIC BLOOD PRESSURE: 108 MMHG

## 2023-09-08 DIAGNOSIS — J01.00 ACUTE NON-RECURRENT MAXILLARY SINUSITIS: Primary | ICD-10-CM

## 2023-09-08 DIAGNOSIS — I10 ESSENTIAL HYPERTENSION, BENIGN: ICD-10-CM

## 2023-09-08 DIAGNOSIS — R05.1 ACUTE COUGH: ICD-10-CM

## 2023-09-08 DIAGNOSIS — F32.A ANXIETY AND DEPRESSION: ICD-10-CM

## 2023-09-08 DIAGNOSIS — J30.1 SEASONAL ALLERGIC RHINITIS DUE TO POLLEN: ICD-10-CM

## 2023-09-08 DIAGNOSIS — F41.9 ANXIETY AND DEPRESSION: ICD-10-CM

## 2023-09-08 RX ORDER — LOSARTAN POTASSIUM 25 MG/1
12.5 TABLET ORAL DAILY
Qty: 90 TABLET | Refills: 1 | Status: SHIPPED | OUTPATIENT
Start: 2023-09-08

## 2023-09-08 RX ORDER — FLUTICASONE PROPIONATE 50 MCG
2 SPRAY, SUSPENSION (ML) NASAL DAILY
Qty: 48 G | Refills: 1 | Status: SHIPPED | OUTPATIENT
Start: 2023-09-08 | End: 2023-09-08 | Stop reason: SDUPTHER

## 2023-09-08 RX ORDER — LISINOPRIL 5 MG/1
5 TABLET ORAL DAILY
Qty: 90 TABLET | Refills: 2 | Status: SHIPPED | OUTPATIENT
Start: 2023-09-08 | End: 2023-09-08 | Stop reason: SINTOL

## 2023-09-08 RX ORDER — DESVENLAFAXINE 100 MG/1
100 TABLET, EXTENDED RELEASE ORAL DAILY
Qty: 90 TABLET | Refills: 3 | Status: SHIPPED | OUTPATIENT
Start: 2023-09-08

## 2023-09-08 RX ORDER — AZITHROMYCIN 250 MG/1
250 TABLET, FILM COATED ORAL DAILY
Qty: 1 PACKET | Refills: 0 | Status: SHIPPED | OUTPATIENT
Start: 2023-09-08

## 2023-09-08 RX ORDER — FLUTICASONE PROPIONATE 50 MCG
2 SPRAY, SUSPENSION (ML) NASAL DAILY
Qty: 48 G | Refills: 1 | Status: SHIPPED | OUTPATIENT
Start: 2023-09-08

## 2023-09-08 ASSESSMENT — ENCOUNTER SYMPTOMS
WHEEZING: 0
SINUS COMPLAINT: 1
VOICE CHANGE: 0
SINUS PAIN: 1
TROUBLE SWALLOWING: 0
COUGH: 1
SINUS PRESSURE: 1
ABDOMINAL DISTENTION: 0
SORE THROAT: 1
RHINORRHEA: 1

## 2023-09-08 NOTE — PROGRESS NOTES
08:20 AM    CL 98 2021 08:20 AM    CO2 29 2021 08:20 AM    BUN 9 2021 08:20 AM    CREATININE 0.58 2021 08:20 AM    GLUCOSE 102 2023 12:00 AM    PROT 7.4 2021 08:20 AM    LABALBU 4.6 2021 08:20 AM    BILITOT 0.59 2021 08:20 AM    ALKPHOS 63 2021 08:20 AM    AST 20 2021 08:20 AM    ALT 15 2021 08:20 AM     Lab Results   Component Value Date/Time    WBC 6.4 2020 11:22 AM    RBC 3.72 2020 11:22 AM    HGB 12.1 2020 11:22 AM    HCT 35.5 2020 11:22 AM    MCV 95.4 2020 11:22 AM    MCH 32.6 2020 11:22 AM    MCHC 34.2 2020 11:22 AM    RDW 12.8 2020 11:22 AM     2020 11:22 AM    MPV NOT REPORTED 2020 11:22 AM     Lab Results   Component Value Date/Time    TSH 1.99 2021 08:20 AM     Lab Results   Component Value Date/Time    CHOL 194 2023 12:00 AM    HDL 64 2023 12:00 AM    LABA1C 5.1 2020 09:02 AM          Assessment & Plan        Diagnosis Orders   1. Essential hypertension, benign  DISCONTINUED: lisinopril (PRINIVIL;ZESTRIL) 5 MG tablet      2. Anxiety and depression  desvenlafaxine succinate (PRISTIQ) 100 MG TB24 extended release tablet                      Completed Refills   Requested Prescriptions     Signed Prescriptions Disp Refills    desvenlafaxine succinate (PRISTIQ) 100 MG TB24 extended release tablet 90 tablet 3     Sig: Take 1 tablet by mouth daily Menopause/depression/back pain    fluticasone (FLONASE) 50 MCG/ACT nasal spray 48 g 1     Si sprays by Each Nostril route daily    azithromycin (ZITHROMAX Z-EDI) 250 MG tablet 1 packet 0     Sig: Take 1 tablet by mouth daily Two tablets by mouth the first day  then one tablet daily for 4 days. losartan (COZAAR) 25 MG tablet 90 tablet 1     Sig: Take 0.5 tablets by mouth daily For hypertension     No follow-ups on file.   Orders Placed This Encounter   Medications    DISCONTD: lisinopril (PRINIVIL;ZESTRIL) 5

## 2023-09-12 RX ORDER — DOXYCYCLINE HYCLATE 100 MG
100 TABLET ORAL 2 TIMES DAILY
Qty: 14 TABLET | Refills: 0 | Status: SHIPPED | OUTPATIENT
Start: 2023-09-12 | End: 2023-09-19

## 2023-11-02 DIAGNOSIS — I10 ESSENTIAL HYPERTENSION, BENIGN: ICD-10-CM

## 2023-11-02 RX ORDER — LISINOPRIL 5 MG/1
TABLET ORAL
Qty: 90 TABLET | Refills: 3 | OUTPATIENT
Start: 2023-11-02

## 2023-11-20 RX ORDER — ARIPIPRAZOLE 2 MG/1
TABLET ORAL
Qty: 90 TABLET | Refills: 1 | Status: SHIPPED | OUTPATIENT
Start: 2023-11-20

## 2023-11-20 NOTE — TELEPHONE ENCOUNTER
Last OV: 4/10/2023  Last RX: 5/23/2023   Next scheduled apt: 3/4/2024      [facility] requesting refill     Isotretinoin Counseling: Patient should get monthly blood tests, not donate blood, not drive at night if vision affected, not share medication, and not undergo elective surgery for 6 months after tx completed. Side effects reviewed, pt to contact office should one occur.

## 2024-01-02 ENCOUNTER — PATIENT MESSAGE (OUTPATIENT)
Dept: PRIMARY CARE CLINIC | Age: 59
End: 2024-01-02

## 2024-01-02 DIAGNOSIS — Z12.31 SCREENING MAMMOGRAM, ENCOUNTER FOR: Primary | ICD-10-CM

## 2024-01-02 DIAGNOSIS — G43.101 MIGRAINE WITH AURA AND WITH STATUS MIGRAINOSUS, NOT INTRACTABLE: ICD-10-CM

## 2024-01-02 RX ORDER — ZOLMITRIPTAN 5 MG/1
5 TABLET, FILM COATED ORAL DAILY PRN
Qty: 15 TABLET | Refills: 1 | Status: CANCELLED | OUTPATIENT
Start: 2024-01-02

## 2024-01-02 RX ORDER — ZOLMITRIPTAN 5 MG/1
5 TABLET, FILM COATED ORAL DAILY PRN
Qty: 15 TABLET | Refills: 1 | Status: SHIPPED | OUTPATIENT
Start: 2024-01-02

## 2024-01-02 NOTE — TELEPHONE ENCOUNTER
From: Gladis Petersen  To: Pavithra Mullen  Sent: 1/2/2024 7:02 AM EST  Subject: Zomig and mammogram requests    Lonnie Arshad, if you could send an RX request to [facility] for Zomig, that would be great, as I only have 1 left. This is the prescription that I cut in half and rarely have to take. Also, I received a letter from [facility] that it's past time for my Mammogram. Do I need to  an order from you, or do I just call and make an appointment? Thanks so much, Gladis

## 2024-01-10 ENCOUNTER — HOSPITAL ENCOUNTER (OUTPATIENT)
Dept: MAMMOGRAPHY | Age: 59
Discharge: HOME | End: 2024-01-12
Payer: COMMERCIAL

## 2024-01-10 DIAGNOSIS — Z12.31 SCREENING MAMMOGRAM, ENCOUNTER FOR: ICD-10-CM

## 2024-01-10 PROCEDURE — 77063 BREAST TOMOSYNTHESIS BI: CPT

## 2024-03-08 ASSESSMENT — PATIENT HEALTH QUESTIONNAIRE - PHQ9
7. TROUBLE CONCENTRATING ON THINGS, SUCH AS READING THE NEWSPAPER OR WATCHING TELEVISION: NOT AT ALL
8. MOVING OR SPEAKING SO SLOWLY THAT OTHER PEOPLE COULD HAVE NOTICED. OR THE OPPOSITE - BEING SO FIDGETY OR RESTLESS THAT YOU HAVE BEEN MOVING AROUND A LOT MORE THAN USUAL: NOT AT ALL
4. FEELING TIRED OR HAVING LITTLE ENERGY: 2
6. FEELING BAD ABOUT YOURSELF - OR THAT YOU ARE A FAILURE OR HAVE LET YOURSELF OR YOUR FAMILY DOWN: 0
3. TROUBLE FALLING OR STAYING ASLEEP: 2
9. THOUGHTS THAT YOU WOULD BE BETTER OFF DEAD, OR OF HURTING YOURSELF: NOT AT ALL
7. TROUBLE CONCENTRATING ON THINGS, SUCH AS READING THE NEWSPAPER OR WATCHING TELEVISION: 0
SUM OF ALL RESPONSES TO PHQ QUESTIONS 1-9: 6
1. LITTLE INTEREST OR PLEASURE IN DOING THINGS: SEVERAL DAYS
8. MOVING OR SPEAKING SO SLOWLY THAT OTHER PEOPLE COULD HAVE NOTICED. OR THE OPPOSITE, BEING SO FIGETY OR RESTLESS THAT YOU HAVE BEEN MOVING AROUND A LOT MORE THAN USUAL: 0
SUM OF ALL RESPONSES TO PHQ QUESTIONS 1-9: 6
SUM OF ALL RESPONSES TO PHQ QUESTIONS 1-9: 6
4. FEELING TIRED OR HAVING LITTLE ENERGY: MORE THAN HALF THE DAYS
10. IF YOU CHECKED OFF ANY PROBLEMS, HOW DIFFICULT HAVE THESE PROBLEMS MADE IT FOR YOU TO DO YOUR WORK, TAKE CARE OF THINGS AT HOME, OR GET ALONG WITH OTHER PEOPLE: 1
SUM OF ALL RESPONSES TO PHQ QUESTIONS 1-9: 6
1. LITTLE INTEREST OR PLEASURE IN DOING THINGS: 1
5. POOR APPETITE OR OVEREATING: 0
10. IF YOU CHECKED OFF ANY PROBLEMS, HOW DIFFICULT HAVE THESE PROBLEMS MADE IT FOR YOU TO DO YOUR WORK, TAKE CARE OF THINGS AT HOME, OR GET ALONG WITH OTHER PEOPLE: SOMEWHAT DIFFICULT
2. FEELING DOWN, DEPRESSED OR HOPELESS: 1
SUM OF ALL RESPONSES TO PHQ9 QUESTIONS 1 & 2: 2
5. POOR APPETITE OR OVEREATING: NOT AT ALL
3. TROUBLE FALLING OR STAYING ASLEEP: MORE THAN HALF THE DAYS
SUM OF ALL RESPONSES TO PHQ QUESTIONS 1-9: 6
6. FEELING BAD ABOUT YOURSELF - OR THAT YOU ARE A FAILURE OR HAVE LET YOURSELF OR YOUR FAMILY DOWN: NOT AT ALL
2. FEELING DOWN, DEPRESSED OR HOPELESS: SEVERAL DAYS
9. THOUGHTS THAT YOU WOULD BE BETTER OFF DEAD, OR OF HURTING YOURSELF: 0

## 2024-03-11 ENCOUNTER — OFFICE VISIT (OUTPATIENT)
Dept: FAMILY MEDICINE CLINIC | Age: 59
End: 2024-03-11
Payer: COMMERCIAL

## 2024-03-11 VITALS
DIASTOLIC BLOOD PRESSURE: 78 MMHG | OXYGEN SATURATION: 99 % | WEIGHT: 110 LBS | SYSTOLIC BLOOD PRESSURE: 118 MMHG | HEART RATE: 70 BPM | HEIGHT: 63 IN | BODY MASS INDEX: 19.49 KG/M2

## 2024-03-11 DIAGNOSIS — F43.21 MOURNING: ICD-10-CM

## 2024-03-11 DIAGNOSIS — R09.81 NASAL CONGESTION: ICD-10-CM

## 2024-03-11 DIAGNOSIS — F41.9 ANXIETY AND DEPRESSION: Primary | ICD-10-CM

## 2024-03-11 DIAGNOSIS — F32.A ANXIETY AND DEPRESSION: Primary | ICD-10-CM

## 2024-03-11 DIAGNOSIS — I10 ESSENTIAL HYPERTENSION, BENIGN: ICD-10-CM

## 2024-03-11 DIAGNOSIS — M85.80 OSTEOPENIA DETERMINED BY X-RAY: ICD-10-CM

## 2024-03-11 DIAGNOSIS — E55.9 VITAMIN D DEFICIENCY: ICD-10-CM

## 2024-03-11 LAB
INFLUENZA A ANTIGEN, POC: NEGATIVE
INFLUENZA B ANTIGEN, POC: NEGATIVE
LOT NUMBER POC: NORMAL
SARS-COV-2 RNA POC - COV: NORMAL
VALID INTERNAL CONTROL, POC: PRESENT
VENDOR AND KIT NAME POC: NORMAL

## 2024-03-11 PROCEDURE — G8427 DOCREV CUR MEDS BY ELIG CLIN: HCPCS | Performed by: NURSE PRACTITIONER

## 2024-03-11 PROCEDURE — 1036F TOBACCO NON-USER: CPT | Performed by: NURSE PRACTITIONER

## 2024-03-11 PROCEDURE — 3017F COLORECTAL CA SCREEN DOC REV: CPT | Performed by: NURSE PRACTITIONER

## 2024-03-11 PROCEDURE — 3078F DIAST BP <80 MM HG: CPT | Performed by: NURSE PRACTITIONER

## 2024-03-11 PROCEDURE — 99213 OFFICE O/P EST LOW 20 MIN: CPT | Performed by: NURSE PRACTITIONER

## 2024-03-11 PROCEDURE — G8420 CALC BMI NORM PARAMETERS: HCPCS | Performed by: NURSE PRACTITIONER

## 2024-03-11 PROCEDURE — G8484 FLU IMMUNIZE NO ADMIN: HCPCS | Performed by: NURSE PRACTITIONER

## 2024-03-11 PROCEDURE — 3074F SYST BP LT 130 MM HG: CPT | Performed by: NURSE PRACTITIONER

## 2024-03-11 RX ORDER — RALOXIFENE HYDROCHLORIDE 60 MG/1
TABLET, FILM COATED ORAL
Qty: 90 TABLET | Refills: 4 | Status: SHIPPED | OUTPATIENT
Start: 2024-03-11

## 2024-03-11 RX ORDER — ARIPIPRAZOLE 2 MG/1
2 TABLET ORAL DAILY
Qty: 90 TABLET | Refills: 1 | Status: SHIPPED | OUTPATIENT
Start: 2024-03-11

## 2024-03-11 RX ORDER — LOSARTAN POTASSIUM 25 MG/1
12.5 TABLET ORAL DAILY
Qty: 90 TABLET | Refills: 3 | Status: SHIPPED | OUTPATIENT
Start: 2024-03-11

## 2024-03-11 NOTE — PROGRESS NOTES
HPI Notes    Name: Gladis Petersen  : 1965         Chief Complaint:     Chief Complaint   Patient presents with    Hypertension     6 month check up    Anxiety/depression     On abilify, states it's working fine       History of Present Illness:        HPI    6 month check up hypertension well controlled with losartan.   Weight loss 8 pounds with loss  to sudden death after knee replacement.   Pt offers tearful times with waves of emotion.   No suicidal ideations.     Evista for osteopenia    Depression controlled with evista and abilify.     Migraine headache takes zomig prn.     Past Medical History:     Past Medical History:   Diagnosis Date    ASCUS of cervix with negative high risk HPV     C. difficile colitis     around     Encounter for screening colonoscopy 2013    Endometriosis     Hypertension     Migraine     Osteopenia       Reviewed all health maintenance requirements and ordered appropriate tests  Health Maintenance Due   Topic Date Due    HIV screen  Never done    Hepatitis C screen  Never done    DTaP/Tdap/Td vaccine (1 - Tdap) Never done    Shingles vaccine (1 of 2) Never done    Respiratory Syncytial Virus (RSV) Pregnant or age 60 yrs+ (1 - 1-dose 60+ series) Never done    Flu vaccine (1) Never done    COVID-19 Vaccine (3 - - season) 2023       Past Surgical History:     Past Surgical History:   Procedure Laterality Date    BACK SURGERY      COLONOSCOPY      in her 20s    COLONOSCOPY N/A 2019    COLONOSCOPY Mark Borrego MD =normal, repeat in 5-10 years (maternal hx polyps)    ESOPHAGOGASTRODUODENOSCOPY  2022    cesar test    LAPAROSCOPY      UPPER GASTROINTESTINAL ENDOSCOPY N/A 2022    EGD BIOPSY performed by Shantelle James DO at [facility] OR        Medications:       Prior to Admission medications    Medication Sig Start Date End Date Taking? Authorizing Provider   raloxifene (EVISTA) 60 MG tablet TAKE 1 TABLET DAILY 3/11/24  Yes Pavithra Mullen

## 2024-03-14 ASSESSMENT — ENCOUNTER SYMPTOMS
EYES NEGATIVE: 1
WHEEZING: 0
COUGH: 0
SHORTNESS OF BREATH: 0
SORE THROAT: 0
CHEST TIGHTNESS: 0

## 2024-06-04 ENCOUNTER — OFFICE VISIT (OUTPATIENT)
Dept: FAMILY MEDICINE CLINIC | Age: 59
End: 2024-06-04
Payer: COMMERCIAL

## 2024-06-04 VITALS
HEART RATE: 71 BPM | SYSTOLIC BLOOD PRESSURE: 126 MMHG | DIASTOLIC BLOOD PRESSURE: 84 MMHG | TEMPERATURE: 98.3 F | OXYGEN SATURATION: 99 %

## 2024-06-04 DIAGNOSIS — M26.621 ARTHRALGIA OF RIGHT TEMPOROMANDIBULAR JOINT: Primary | ICD-10-CM

## 2024-06-04 PROCEDURE — 99213 OFFICE O/P EST LOW 20 MIN: CPT | Performed by: NURSE PRACTITIONER

## 2024-06-04 PROCEDURE — G8427 DOCREV CUR MEDS BY ELIG CLIN: HCPCS | Performed by: NURSE PRACTITIONER

## 2024-06-04 PROCEDURE — 1036F TOBACCO NON-USER: CPT | Performed by: NURSE PRACTITIONER

## 2024-06-04 PROCEDURE — 3074F SYST BP LT 130 MM HG: CPT | Performed by: NURSE PRACTITIONER

## 2024-06-04 PROCEDURE — 3017F COLORECTAL CA SCREEN DOC REV: CPT | Performed by: NURSE PRACTITIONER

## 2024-06-04 PROCEDURE — 3079F DIAST BP 80-89 MM HG: CPT | Performed by: NURSE PRACTITIONER

## 2024-06-04 PROCEDURE — G8420 CALC BMI NORM PARAMETERS: HCPCS | Performed by: NURSE PRACTITIONER

## 2024-06-04 SDOH — ECONOMIC STABILITY: HOUSING INSECURITY
IN THE LAST 12 MONTHS, WAS THERE A TIME WHEN YOU DID NOT HAVE A STEADY PLACE TO SLEEP OR SLEPT IN A SHELTER (INCLUDING NOW)?: NO

## 2024-06-04 SDOH — ECONOMIC STABILITY: INCOME INSECURITY: HOW HARD IS IT FOR YOU TO PAY FOR THE VERY BASICS LIKE FOOD, HOUSING, MEDICAL CARE, AND HEATING?: NOT HARD AT ALL

## 2024-06-04 SDOH — ECONOMIC STABILITY: FOOD INSECURITY: WITHIN THE PAST 12 MONTHS, YOU WORRIED THAT YOUR FOOD WOULD RUN OUT BEFORE YOU GOT MONEY TO BUY MORE.: NEVER TRUE

## 2024-06-04 SDOH — ECONOMIC STABILITY: FOOD INSECURITY: WITHIN THE PAST 12 MONTHS, THE FOOD YOU BOUGHT JUST DIDN'T LAST AND YOU DIDN'T HAVE MONEY TO GET MORE.: NEVER TRUE

## 2024-06-04 ASSESSMENT — ENCOUNTER SYMPTOMS
COUGH: 0
DIARRHEA: 0
SHORTNESS OF BREATH: 0
VOMITING: 0
NAUSEA: 0

## 2024-06-04 NOTE — PROGRESS NOTES
25 MG tablet Take 0.5 tablets by mouth daily For hypertension 3/11/24  Yes Pavithra Mullen APRN - CNP   ARIPiprazole (ABILIFY) 2 MG tablet Take 1 tablet by mouth daily depression 3/11/24  Yes Pavithra Mullen APRN - CNP   ZOLMitriptan (ZOMIG) 5 MG tablet Take 1 tablet by mouth daily as needed for Migraine 1/2/24  Yes Pavithra Mullen APRN - CNP   desvenlafaxine succinate (PRISTIQ) 100 MG TB24 extended release tablet Take 1 tablet by mouth daily Menopause/depression/back pain 9/8/23  Yes Pavithra Mullen APRN - CNP   NONFORMULARY Indications: Calcium takes 1000 a day   Yes Provider, Historical, MD   NONFORMULARY Indications: Orthomega fish oil 1 capsule   Yes Provider, Historical, MD        Allergies:       Penicillins and Boniva [ibandronic acid]    Social History:     Tobacco:    reports that she has never smoked. She has never used smokeless tobacco.  Alcohol:      reports current alcohol use of about 3.0 standard drinks of alcohol per week.  Drug Use:  reports no history of drug use.    Family History:     Family History   Problem Relation Age of Onset    Diabetes Mother     Hypertension Mother     Cancer Mother         Liver Cancer    Cancer Father         Non-Hodgkins Lymphoma    No Known Problems Sister     Diabetes Brother     Breast Cancer Maternal Aunt        Review of Systems:         Review of Systems   Constitutional:  Negative for chills and fever.   HENT:  Positive for ear pain.    Respiratory:  Negative for cough and shortness of breath.    Cardiovascular:  Negative for chest pain and palpitations.   Gastrointestinal:  Negative for diarrhea, nausea and vomiting.   Neurological:  Negative for dizziness, seizures and headaches.         Physical Exam:     Vitals:  /84 (Site: Left Upper Arm, Position: Sitting)   Pulse 71   Temp 98.3 °F (36.8 °C)   LMP 09/28/2015 (Approximate)   SpO2 99%       Physical Exam  Vitals and nursing note reviewed.   Constitutional:       Appearance: Normal appearance. She

## 2024-09-23 ENCOUNTER — OFFICE VISIT (OUTPATIENT)
Dept: FAMILY MEDICINE CLINIC | Age: 59
End: 2024-09-23
Payer: COMMERCIAL

## 2024-09-23 VITALS
SYSTOLIC BLOOD PRESSURE: 120 MMHG | HEIGHT: 62 IN | HEART RATE: 73 BPM | OXYGEN SATURATION: 99 % | DIASTOLIC BLOOD PRESSURE: 80 MMHG | WEIGHT: 108 LBS | BODY MASS INDEX: 19.88 KG/M2

## 2024-09-23 DIAGNOSIS — F41.9 ANXIETY AND DEPRESSION: ICD-10-CM

## 2024-09-23 DIAGNOSIS — F32.A ANXIETY AND DEPRESSION: ICD-10-CM

## 2024-09-23 DIAGNOSIS — Z12.31 SCREENING MAMMOGRAM, ENCOUNTER FOR: ICD-10-CM

## 2024-09-23 DIAGNOSIS — Z13.820 SCREENING FOR OSTEOPOROSIS: ICD-10-CM

## 2024-09-23 DIAGNOSIS — Z78.0 POSTMENOPAUSAL ESTROGEN DEFICIENCY: ICD-10-CM

## 2024-09-23 DIAGNOSIS — I10 ESSENTIAL HYPERTENSION, BENIGN: ICD-10-CM

## 2024-09-23 DIAGNOSIS — Z00.00 ENCOUNTER FOR WELL ADULT EXAM WITHOUT ABNORMAL FINDINGS: Primary | ICD-10-CM

## 2024-09-23 PROCEDURE — 3079F DIAST BP 80-89 MM HG: CPT | Performed by: NURSE PRACTITIONER

## 2024-09-23 PROCEDURE — 3074F SYST BP LT 130 MM HG: CPT | Performed by: NURSE PRACTITIONER

## 2024-09-23 PROCEDURE — 99396 PREV VISIT EST AGE 40-64: CPT | Performed by: NURSE PRACTITIONER

## 2024-09-23 RX ORDER — DESVENLAFAXINE 100 MG/1
100 TABLET, EXTENDED RELEASE ORAL DAILY
Qty: 90 TABLET | Refills: 3 | Status: SHIPPED | OUTPATIENT
Start: 2024-09-23

## 2024-09-23 RX ORDER — LOSARTAN POTASSIUM 25 MG/1
12.5 TABLET ORAL DAILY
Qty: 90 TABLET | Refills: 3 | Status: SHIPPED | OUTPATIENT
Start: 2024-09-23

## 2024-09-23 RX ORDER — ARIPIPRAZOLE 2 MG/1
2 TABLET ORAL DAILY
Qty: 90 TABLET | Refills: 1 | Status: SHIPPED | OUTPATIENT
Start: 2024-09-23

## 2024-09-24 ASSESSMENT — ENCOUNTER SYMPTOMS
SHORTNESS OF BREATH: 0
CHEST TIGHTNESS: 0
COUGH: 0
EYES NEGATIVE: 1
SORE THROAT: 0
WHEEZING: 0

## 2024-09-26 DIAGNOSIS — F41.9 ANXIETY AND DEPRESSION: ICD-10-CM

## 2024-09-26 DIAGNOSIS — F32.A ANXIETY AND DEPRESSION: ICD-10-CM

## 2024-09-26 RX ORDER — DESVENLAFAXINE 100 MG/1
TABLET, EXTENDED RELEASE ORAL
Qty: 90 TABLET | Refills: 3 | OUTPATIENT
Start: 2024-09-26

## 2024-10-22 ENCOUNTER — TELEPHONE (OUTPATIENT)
Dept: PRIMARY CARE CLINIC | Age: 59
End: 2024-10-22

## 2024-10-22 NOTE — TELEPHONE ENCOUNTER
Pt called in checking on this. States her concern for asking if because she leaves for vacation tomorrow

## 2024-10-22 NOTE — TELEPHONE ENCOUNTER
Pt called in states that she has a sinus infection wondering if something can be called in.     Pt complains of sore throat, sinus pressure, stuffy nose. Started feeling sick last night.     Drug Downing- Jonh

## 2024-10-23 ENCOUNTER — PATIENT MESSAGE (OUTPATIENT)
Dept: FAMILY MEDICINE CLINIC | Age: 59
End: 2024-10-23

## 2024-10-23 DIAGNOSIS — J01.01 ACUTE RECURRENT MAXILLARY SINUSITIS: Primary | ICD-10-CM

## 2024-10-23 RX ORDER — AZITHROMYCIN 250 MG/1
TABLET, FILM COATED ORAL
Qty: 1 PACKET | Refills: 0 | Status: SHIPPED | OUTPATIENT
Start: 2024-10-23 | End: 2024-11-02

## 2025-01-10 ENCOUNTER — HOSPITAL ENCOUNTER (OUTPATIENT)
Dept: MAMMOGRAPHY | Age: 60
Discharge: HOME | End: 2025-01-12
Payer: COMMERCIAL

## 2025-01-10 ENCOUNTER — HOSPITAL ENCOUNTER (OUTPATIENT)
Dept: BONE DENSITY | Age: 60
Discharge: HOME | End: 2025-01-12
Payer: COMMERCIAL

## 2025-01-10 DIAGNOSIS — Z13.820 SCREENING FOR OSTEOPOROSIS: ICD-10-CM

## 2025-01-10 DIAGNOSIS — Z12.31 SCREENING MAMMOGRAM, ENCOUNTER FOR: ICD-10-CM

## 2025-01-10 DIAGNOSIS — Z78.0 POSTMENOPAUSAL ESTROGEN DEFICIENCY: ICD-10-CM

## 2025-01-10 PROCEDURE — 77063 BREAST TOMOSYNTHESIS BI: CPT

## 2025-01-10 PROCEDURE — 77080 DXA BONE DENSITY AXIAL: CPT

## 2025-01-21 ENCOUNTER — OFFICE VISIT (OUTPATIENT)
Dept: OBGYN CLINIC | Age: 60
End: 2025-01-21
Payer: COMMERCIAL

## 2025-01-21 ENCOUNTER — HOSPITAL ENCOUNTER (OUTPATIENT)
Age: 60
Setting detail: SPECIMEN
Discharge: HOME | End: 2025-01-21
Payer: COMMERCIAL

## 2025-01-21 VITALS
SYSTOLIC BLOOD PRESSURE: 112 MMHG | DIASTOLIC BLOOD PRESSURE: 68 MMHG | WEIGHT: 114 LBS | BODY MASS INDEX: 20.98 KG/M2 | HEIGHT: 62 IN

## 2025-01-21 DIAGNOSIS — Z01.419 WOMEN'S ANNUAL ROUTINE GYNECOLOGICAL EXAMINATION: Primary | ICD-10-CM

## 2025-01-21 DIAGNOSIS — Z01.419 WOMEN'S ANNUAL ROUTINE GYNECOLOGICAL EXAMINATION: ICD-10-CM

## 2025-01-21 DIAGNOSIS — M81.6 LOCALIZED OSTEOPOROSIS WITHOUT CURRENT PATHOLOGICAL FRACTURE: ICD-10-CM

## 2025-01-21 PROCEDURE — 3074F SYST BP LT 130 MM HG: CPT | Performed by: OBSTETRICS & GYNECOLOGY

## 2025-01-21 PROCEDURE — 99396 PREV VISIT EST AGE 40-64: CPT | Performed by: OBSTETRICS & GYNECOLOGY

## 2025-01-21 PROCEDURE — G0145 SCR C/V CYTO,THINLAYER,RESCR: HCPCS

## 2025-01-21 PROCEDURE — 3078F DIAST BP <80 MM HG: CPT | Performed by: OBSTETRICS & GYNECOLOGY

## 2025-01-21 NOTE — PROGRESS NOTES
YEARLY PHYSICAL    Date of service: 2025    Gladis Petersen  Is a 59 y.o.   female    PT's PCP is: Pavithra Mullen, APRN - CNP     : 1965                                             Subjective:       Patient's last menstrual period was 2015 (approximate).     Are your menses regular: not applicable    OB History    Para Term  AB Living   3 2 2   1 2   SAB IAB Ectopic Molar Multiple Live Births   1         2      # Outcome Date GA Lbr Iam/2nd Weight Sex Type Anes PTL Lv   3 Term      Vag-Spont   YONI   2 Term      Vag-Spont   YONI   1 SAB                 Social History     Tobacco Use   Smoking Status Never   Smokeless Tobacco Never        Social History     Substance and Sexual Activity   Alcohol Use Yes    Alcohol/week: 3.0 standard drinks of alcohol    Types: 3 Shots of liquor per week    Comment: occas.       Family History   Problem Relation Age of Onset    Diabetes Mother     Hypertension Mother     Cancer Mother         Liver Cancer    Cancer Father         Non-Hodgkins Lymphoma    No Known Problems Sister     Diabetes Brother     Breast Cancer Maternal Aunt        Allergies: Penicillins and Boniva [ibandronic acid]      Current Outpatient Medications:     ARIPiprazole (ABILIFY) 2 MG tablet, Take 1 tablet by mouth daily depression, Disp: 90 tablet, Rfl: 1    losartan (COZAAR) 25 MG tablet, Take 0.5 tablets by mouth daily For hypertension, Disp: 90 tablet, Rfl: 3    desvenlafaxine succinate (PRISTIQ) 100 MG TB24 extended release tablet, Take 1 tablet by mouth daily Menopause/depression/back pain, Disp: 90 tablet, Rfl: 3    raloxifene (EVISTA) 60 MG tablet, TAKE 1 TABLET DAILY, Disp: 90 tablet, Rfl: 4    ZOLMitriptan (ZOMIG) 5 MG tablet, Take 1 tablet by mouth daily as needed for Migraine, Disp: 15 tablet, Rfl: 1    NONFORMULARY, Indications: Calcium takes 1000 a day, Disp: , Rfl:     NONFORMULARY,

## 2025-01-30 LAB — CYTOLOGY REPORT: NORMAL

## 2025-03-31 ENCOUNTER — OFFICE VISIT (OUTPATIENT)
Dept: FAMILY MEDICINE CLINIC | Age: 60
End: 2025-03-31
Payer: COMMERCIAL

## 2025-03-31 VITALS
DIASTOLIC BLOOD PRESSURE: 70 MMHG | OXYGEN SATURATION: 99 % | BODY MASS INDEX: 21.19 KG/M2 | HEART RATE: 70 BPM | WEIGHT: 114 LBS | SYSTOLIC BLOOD PRESSURE: 110 MMHG

## 2025-03-31 DIAGNOSIS — F32.A ANXIETY AND DEPRESSION: ICD-10-CM

## 2025-03-31 DIAGNOSIS — F41.9 ANXIETY AND DEPRESSION: ICD-10-CM

## 2025-03-31 DIAGNOSIS — M85.80 OSTEOPENIA DETERMINED BY X-RAY: ICD-10-CM

## 2025-03-31 DIAGNOSIS — Z78.0 MENOPAUSE: ICD-10-CM

## 2025-03-31 DIAGNOSIS — I10 ESSENTIAL HYPERTENSION, BENIGN: Primary | ICD-10-CM

## 2025-03-31 PROCEDURE — 3017F COLORECTAL CA SCREEN DOC REV: CPT | Performed by: NURSE PRACTITIONER

## 2025-03-31 PROCEDURE — G8420 CALC BMI NORM PARAMETERS: HCPCS | Performed by: NURSE PRACTITIONER

## 2025-03-31 PROCEDURE — G8427 DOCREV CUR MEDS BY ELIG CLIN: HCPCS | Performed by: NURSE PRACTITIONER

## 2025-03-31 PROCEDURE — 3078F DIAST BP <80 MM HG: CPT | Performed by: NURSE PRACTITIONER

## 2025-03-31 PROCEDURE — 1036F TOBACCO NON-USER: CPT | Performed by: NURSE PRACTITIONER

## 2025-03-31 PROCEDURE — 99213 OFFICE O/P EST LOW 20 MIN: CPT | Performed by: NURSE PRACTITIONER

## 2025-03-31 PROCEDURE — 3074F SYST BP LT 130 MM HG: CPT | Performed by: NURSE PRACTITIONER

## 2025-03-31 RX ORDER — LOSARTAN POTASSIUM 25 MG/1
12.5 TABLET ORAL DAILY
Qty: 90 TABLET | Refills: 3 | Status: SHIPPED | OUTPATIENT
Start: 2025-03-31

## 2025-03-31 RX ORDER — RALOXIFENE HYDROCHLORIDE 60 MG/1
TABLET, FILM COATED ORAL
Qty: 90 TABLET | Refills: 3 | Status: SHIPPED | OUTPATIENT
Start: 2025-03-31

## 2025-03-31 RX ORDER — ARIPIPRAZOLE 2 MG/1
2 TABLET ORAL DAILY
Qty: 90 TABLET | Refills: 1 | Status: SHIPPED | OUTPATIENT
Start: 2025-03-31

## 2025-03-31 RX ORDER — DESVENLAFAXINE 100 MG/1
100 TABLET, EXTENDED RELEASE ORAL DAILY
Qty: 90 TABLET | Refills: 3 | Status: SHIPPED | OUTPATIENT
Start: 2025-03-31

## 2025-03-31 RX ORDER — ARIPIPRAZOLE 2 MG/1
TABLET ORAL
Qty: 90 TABLET | Refills: 3 | OUTPATIENT
Start: 2025-03-31

## 2025-03-31 SDOH — ECONOMIC STABILITY: FOOD INSECURITY: WITHIN THE PAST 12 MONTHS, YOU WORRIED THAT YOUR FOOD WOULD RUN OUT BEFORE YOU GOT MONEY TO BUY MORE.: NEVER TRUE

## 2025-03-31 SDOH — ECONOMIC STABILITY: FOOD INSECURITY: WITHIN THE PAST 12 MONTHS, THE FOOD YOU BOUGHT JUST DIDN'T LAST AND YOU DIDN'T HAVE MONEY TO GET MORE.: NEVER TRUE

## 2025-03-31 ASSESSMENT — PATIENT HEALTH QUESTIONNAIRE - PHQ9
SUM OF ALL RESPONSES TO PHQ QUESTIONS 1-9: 0
SUM OF ALL RESPONSES TO PHQ QUESTIONS 1-9: 0
1. LITTLE INTEREST OR PLEASURE IN DOING THINGS: NOT AT ALL
6. FEELING BAD ABOUT YOURSELF - OR THAT YOU ARE A FAILURE OR HAVE LET YOURSELF OR YOUR FAMILY DOWN: NOT AT ALL
2. FEELING DOWN, DEPRESSED OR HOPELESS: NOT AT ALL
9. THOUGHTS THAT YOU WOULD BE BETTER OFF DEAD, OR OF HURTING YOURSELF: NOT AT ALL
5. POOR APPETITE OR OVEREATING: NOT AT ALL
10. IF YOU CHECKED OFF ANY PROBLEMS, HOW DIFFICULT HAVE THESE PROBLEMS MADE IT FOR YOU TO DO YOUR WORK, TAKE CARE OF THINGS AT HOME, OR GET ALONG WITH OTHER PEOPLE: NOT DIFFICULT AT ALL
SUM OF ALL RESPONSES TO PHQ QUESTIONS 1-9: 0
4. FEELING TIRED OR HAVING LITTLE ENERGY: NOT AT ALL
8. MOVING OR SPEAKING SO SLOWLY THAT OTHER PEOPLE COULD HAVE NOTICED. OR THE OPPOSITE, BEING SO FIGETY OR RESTLESS THAT YOU HAVE BEEN MOVING AROUND A LOT MORE THAN USUAL: NOT AT ALL
7. TROUBLE CONCENTRATING ON THINGS, SUCH AS READING THE NEWSPAPER OR WATCHING TELEVISION: NOT AT ALL
SUM OF ALL RESPONSES TO PHQ QUESTIONS 1-9: 0
3. TROUBLE FALLING OR STAYING ASLEEP: NOT AT ALL

## 2025-03-31 NOTE — PROGRESS NOTES
Medications    raloxifene (EVISTA) 60 MG tablet     Sig: TAKE 1 TABLET DAILY osteoporosis     Dispense:  90 tablet     Refill:  3    ARIPiprazole (ABILIFY) 2 MG tablet     Sig: Take 1 tablet by mouth daily depression     Dispense:  90 tablet     Refill:  1    losartan (COZAAR) 25 MG tablet     Sig: Take 0.5 tablets by mouth daily For hypertension     Dispense:  90 tablet     Refill:  3     Stop lisinopril due to cough    desvenlafaxine succinate (PRISTIQ) 100 MG TB24 extended release tablet     Sig: Take 1 tablet by mouth daily Menopause/depression/back pain     Dispense:  90 tablet     Refill:  3     No orders of the defined types were placed in this encounter.                 On this date 3/31/2025 I have spent 29 minutes reviewing previous notes, test results and face to face with the patient discussing the diagnosis and importance of compliance with the treatment plan as well as documenting on the day of the visit.     Electronically signed by WENDY Adkins CNP on 4/3/2025 at 12:32 AM

## 2025-03-31 NOTE — PATIENT INSTRUCTIONS
THANK YOU FOR TRUSTING US WITH YOUR HEALTHCARE !!    The associates caring for you today were:    Family Practice Provider: Pavithra NGUYEN-KIMBERLY    Clinical Team Nurse: Lorie Wilkinson LPN    Clinical Team Medical Assistant: Radha Grande MA    Our goal is to provide you with EXCEPTIONAL patient care, and we strive to do this for EVERY patient, EVERY visit. Since we care about you and your experience, you may receive a patient satisfaction survey from Raza Madden by postal mail/email/text. We truly welcome your feedback and ask that you complete the survey to let us know how we are doing.    Important Numbers:  [facility] phone [phone]   [place] [facility] Nurse/MA Line: [phone]  Fax  [phone]   Central [phone]  Billing questions: [phone]  Medical Records Request: [phone]    Manage your healthcare  with [facility] My[facility]. To activate your account, visit [email or web address]   DIGITAL scheduling available now through my chart.  Schedule your next appointment at your convenience through your my chart.       [place] Office Hours:  Monday: [place] office location 8-5 ([phone]) Offering additional late hours the first Monday of the month until 7 pm.   Tuesday: 8: :  812 Noon, closed  of the month   : 7:30-4:30   SURVEY:    You may be receiving a survey from Raza Madden regarding your visit today.    Please complete the survey to enable us to provide the highest quality of care to you and your family.    If you cannot score us a very good on any question, please call the office to discuss how we could have made your experience a very good one.    Thank you.       Tdap (tetanus, diptheria and pertussis) vaccine- at [facility]

## 2025-03-31 NOTE — TELEPHONE ENCOUNTER
Last OV: 9/23/2024  anxiety and depression   Last RX:    Next scheduled apt: 3/31/2025          Surescript requesting a refill   Medication pending for approval

## 2025-04-03 ASSESSMENT — ENCOUNTER SYMPTOMS
ABDOMINAL DISTENTION: 0
SORE THROAT: 0
CONSTIPATION: 0
NAUSEA: 0
SHORTNESS OF BREATH: 0
CHEST TIGHTNESS: 0
WHEEZING: 0
EYES NEGATIVE: 1
COUGH: 0

## 2025-07-22 ENCOUNTER — PATIENT MESSAGE (OUTPATIENT)
Dept: FAMILY MEDICINE CLINIC | Age: 60
End: 2025-07-22

## 2025-08-01 ENCOUNTER — OFFICE VISIT (OUTPATIENT)
Dept: PRIMARY CARE CLINIC | Age: 60
End: 2025-08-01
Payer: COMMERCIAL

## 2025-08-01 VITALS
SYSTOLIC BLOOD PRESSURE: 138 MMHG | HEIGHT: 62 IN | HEART RATE: 67 BPM | DIASTOLIC BLOOD PRESSURE: 88 MMHG | WEIGHT: 119 LBS | OXYGEN SATURATION: 99 % | BODY MASS INDEX: 21.9 KG/M2

## 2025-08-01 DIAGNOSIS — M79.605 LOW BACK PAIN RADIATING TO BOTH LEGS: ICD-10-CM

## 2025-08-01 DIAGNOSIS — M54.50 LOW BACK PAIN RADIATING TO BOTH LEGS: ICD-10-CM

## 2025-08-01 DIAGNOSIS — M54.17 LUMBOSACRAL RADICULOPATHY AT L5: Primary | ICD-10-CM

## 2025-08-01 DIAGNOSIS — M79.604 LOW BACK PAIN RADIATING TO BOTH LEGS: ICD-10-CM

## 2025-08-01 PROCEDURE — G8420 CALC BMI NORM PARAMETERS: HCPCS | Performed by: NURSE PRACTITIONER

## 2025-08-01 PROCEDURE — 3075F SYST BP GE 130 - 139MM HG: CPT | Performed by: NURSE PRACTITIONER

## 2025-08-01 PROCEDURE — G8427 DOCREV CUR MEDS BY ELIG CLIN: HCPCS | Performed by: NURSE PRACTITIONER

## 2025-08-01 PROCEDURE — 99203 OFFICE O/P NEW LOW 30 MIN: CPT | Performed by: NURSE PRACTITIONER

## 2025-08-01 PROCEDURE — 3079F DIAST BP 80-89 MM HG: CPT | Performed by: NURSE PRACTITIONER

## 2025-08-01 PROCEDURE — 3017F COLORECTAL CA SCREEN DOC REV: CPT | Performed by: NURSE PRACTITIONER

## 2025-08-01 PROCEDURE — 1036F TOBACCO NON-USER: CPT | Performed by: NURSE PRACTITIONER

## 2025-08-01 RX ORDER — PREDNISONE 10 MG/1
TABLET ORAL
Qty: 18 TABLET | Refills: 0 | Status: SHIPPED | OUTPATIENT
Start: 2025-08-01 | End: 2025-08-10

## 2025-08-01 RX ORDER — GABAPENTIN 100 MG/1
100 CAPSULE ORAL 2 TIMES DAILY PRN
Qty: 28 CAPSULE | Refills: 0 | Status: SHIPPED | OUTPATIENT
Start: 2025-08-01 | End: 2025-08-15

## 2025-08-01 ASSESSMENT — ENCOUNTER SYMPTOMS
BACK PAIN: 1
BOWEL INCONTINENCE: 0

## 2025-08-01 NOTE — PROGRESS NOTES
HPI Notes    Name: Gladis Petersen  : 1965         Chief Complaint:     Chief Complaint   Patient presents with    Lower Back Pain     Pt states for the last 6 weeks she's had unbearable back pain, she was lifting some files out of filing cabinet.  She states in the last few days the pain has gotten a lot better, currently rates pain 2/10.        History of Present Illness:        Back Pain  This is a new problem. The current episode started more than 1 month ago. The problem occurs constantly. The problem has been waxing and waning since onset. The pain is present in the gluteal and lumbar spine. The quality of the pain is described as stabbing, aching and burning. The pain radiates to the right thigh and left thigh. The pain is at a severity of 2/10 (pain x 6 weeks at worse 7-8/10). The symptoms are aggravated by bending, standing and twisting. Stiffness is present All day. Pertinent negatives include no bladder incontinence, bowel incontinence, chest pain, leg pain, numbness, paresthesias, perianal numbness or weakness. Risk factors include history of osteoporosis, menopause, sedentary lifestyle and lack of exercise. She has tried chiropractic manipulation, bed rest, home exercises, ice and NSAIDs for the symptoms. The treatment provided mild relief.       Low back pain , hx surgery in  , has done well   Lumbar DDD, some osteoporosis hips .     Ice, ibuprofen, rest.      Pt remains on pristiq for hot flashes, depression loss        Past Medical History:     Past Medical History:   Diagnosis Date    ASCUS of cervix with negative high risk HPV     C. difficile colitis     around     Encounter for screening colonoscopy 2013    Endometriosis     Hypertension     Migraine     Osteopenia       Reviewed all health maintenance requirements and ordered appropriate tests  Health Maintenance Due   Topic Date Due    HIV screen  Never done    Hepatitis C screen  Never done    DTaP/Tdap/Td

## 2025-08-01 NOTE — PATIENT INSTRUCTIONS
THANK YOU FOR TRUSTING US WITH YOUR HEALTHCARE !!    The associates caring for you today were:    Family Practice Provider: Pavithra NGUYEN-KIMBERYL    Clinical Team Nurse: Lorie Wilkinson LPN    Clinical Team Medical Assistant: Radha Grande MA    Our goal is to provide you with EXCEPTIONAL patient care, and we strive to do this for EVERY patient, EVERY visit. Since we care about you and your experience, you may receive a patient satisfaction survey from Raza Madden by postal mail/email/text. We truly welcome your feedback and ask that you complete the survey to let us know how we are doing.    Important Numbers:  [facility] phone [phone]   [place] Office Nurse/MA Line: [phone]  Fax  [phone]   Central [phone]  Billing questions: [phone]  Medical Records Request: [phone]    Manage your healthcare  with [facility] My[facility]. To activate your account, visit [email or web address]   DIGITAL scheduling available now through my chart.  Schedule your next appointment at your convenience through your my chart.       [place] Office Hours:  Monday: [place] office location 8-5 ([phone]) Offering additional late hours the first Monday of the month until 7 pm.   Tuesday: 8: :  812 Noon, closed  of the month   : 7:30-4:30   SURVEY:    You may be receiving a survey from Raza Madden regarding your visit today.    Please complete the survey to enable us to provide the highest quality of care to you and your family.    If you cannot score us a very good on any question, please call the office to discuss how we could have made your experience a very good one.    Thank you.

## (undated) DEVICE — JELLY LUBRICATING 4OZ FLIP TOP TB E Z

## (undated) DEVICE — FORCEPS BX L240CM JAW DIA2.2MM RAD JAW 4 HOT DISP

## (undated) DEVICE — FORCEPS BX L240CM JAW DIA2.4MM ORNG L CAP W/ NDL DISP RAD

## (undated) DEVICE — SOLUTION IV IRRIG POUR BRL 0.9% SODIUM CHL 2F7124

## (undated) DEVICE — MEDI-VAC NON-CONDUCTIVE SUCTION TUBING 6MM X 6.1M (20 FT.) L: Brand: CARDINAL HEALTH

## (undated) DEVICE — GOWN,AURORA,NONRNF,XL,30/CS: Brand: MEDLINE

## (undated) DEVICE — TUBING SUCT NON-STRL 9/32X100 W/CNNT